# Patient Record
Sex: MALE | Race: WHITE | NOT HISPANIC OR LATINO | Employment: OTHER | URBAN - METROPOLITAN AREA
[De-identification: names, ages, dates, MRNs, and addresses within clinical notes are randomized per-mention and may not be internally consistent; named-entity substitution may affect disease eponyms.]

---

## 2020-04-13 ENCOUNTER — HOSPITAL ENCOUNTER (INPATIENT)
Facility: HOSPITAL | Age: 70
LOS: 14 days | Discharge: HOME | DRG: 885 | End: 2020-04-27
Attending: PSYCHIATRY & NEUROLOGY | Admitting: PSYCHIATRY & NEUROLOGY
Payer: COMMERCIAL

## 2020-04-13 ENCOUNTER — HOSPITAL ENCOUNTER (EMERGENCY)
Facility: HOSPITAL | Age: 70
End: 2020-04-13
Attending: EMERGENCY MEDICINE
Payer: COMMERCIAL

## 2020-04-13 VITALS
WEIGHT: 171.5 LBS | HEART RATE: 90 BPM | RESPIRATION RATE: 20 BRPM | SYSTOLIC BLOOD PRESSURE: 128 MMHG | TEMPERATURE: 97.2 F | BODY MASS INDEX: 23.23 KG/M2 | DIASTOLIC BLOOD PRESSURE: 77 MMHG | HEIGHT: 72 IN | OXYGEN SATURATION: 97 %

## 2020-04-13 DIAGNOSIS — R45.851 SUICIDAL IDEATIONS: ICD-10-CM

## 2020-04-13 DIAGNOSIS — G62.9 POLYNEUROPATHY: ICD-10-CM

## 2020-04-13 DIAGNOSIS — S22.080A T12 COMPRESSION FRACTURE (HCC): Chronic | ICD-10-CM

## 2020-04-13 DIAGNOSIS — R13.19 OTHER DYSPHAGIA: ICD-10-CM

## 2020-04-13 DIAGNOSIS — F32.A DEPRESSION: ICD-10-CM

## 2020-04-13 DIAGNOSIS — Z00.8 MEDICAL CLEARANCE FOR PSYCHIATRIC ADMISSION: ICD-10-CM

## 2020-04-13 DIAGNOSIS — Z00.8 MEDICAL CLEARANCE FOR PSYCHIATRIC ADMISSION: Primary | ICD-10-CM

## 2020-04-13 DIAGNOSIS — F33.3 MAJOR DEPRESSIVE DISORDER, RECURRENT, SEVERE WITH PSYCHOTIC FEATURES (HCC): Primary | Chronic | ICD-10-CM

## 2020-04-13 DIAGNOSIS — K50.919 CROHN'S DISEASE WITH COMPLICATION, UNSPECIFIED GASTROINTESTINAL TRACT LOCATION (HCC): ICD-10-CM

## 2020-04-13 LAB
ALBUMIN SERPL BCP-MCNC: 3.1 G/DL (ref 3.5–5)
ALP SERPL-CCNC: 140 U/L (ref 46–116)
ALT SERPL W P-5'-P-CCNC: 44 U/L (ref 12–78)
AMPHETAMINES SERPL QL SCN: NEGATIVE
ANION GAP SERPL CALCULATED.3IONS-SCNC: 5 MMOL/L (ref 4–13)
AST SERPL W P-5'-P-CCNC: 34 U/L (ref 5–45)
ATRIAL RATE: 83 BPM
BARBITURATES UR QL: NEGATIVE
BASOPHILS # BLD AUTO: 0.03 THOUSANDS/ΜL (ref 0–0.1)
BASOPHILS NFR BLD AUTO: 0 % (ref 0–1)
BENZODIAZ UR QL: NEGATIVE
BILIRUB SERPL-MCNC: 0.6 MG/DL (ref 0.2–1)
BUN SERPL-MCNC: 28 MG/DL (ref 5–25)
CALCIUM SERPL-MCNC: 8.9 MG/DL (ref 8.3–10.1)
CHLORIDE SERPL-SCNC: 100 MMOL/L (ref 100–108)
CLARITY, POC: CLEAR
CO2 SERPL-SCNC: 34 MMOL/L (ref 21–32)
COCAINE UR QL: NEGATIVE
COLOR, POC: YELLOW
CREAT SERPL-MCNC: 0.97 MG/DL (ref 0.6–1.3)
EOSINOPHIL # BLD AUTO: 0.02 THOUSAND/ΜL (ref 0–0.61)
EOSINOPHIL NFR BLD AUTO: 0 % (ref 0–6)
ERYTHROCYTE [DISTWIDTH] IN BLOOD BY AUTOMATED COUNT: 14.1 % (ref 11.6–15.1)
ETHANOL EXG-MCNC: 0 MG/DL
EXT BILIRUBIN, UA: NORMAL
EXT BLOOD URINE: NORMAL
EXT GLUCOSE, UA: NORMAL
EXT KETONES: NORMAL
EXT NITRITE, UA: NORMAL
EXT PH, UA: 8
EXT PROTEIN, UA: NORMAL
EXT SPECIFIC GRAVITY, UA: 1
EXT UROBILINOGEN: 0.2
GFR SERPL CREATININE-BSD FRML MDRD: 80 ML/MIN/1.73SQ M
GLUCOSE SERPL-MCNC: 131 MG/DL (ref 65–140)
HCT VFR BLD AUTO: 47.5 % (ref 36.5–49.3)
HGB BLD-MCNC: 15.1 G/DL (ref 12–17)
HOLD SPECIMEN: NORMAL
HOLD SPECIMEN: NORMAL
IMM GRANULOCYTES # BLD AUTO: 0.09 THOUSAND/UL (ref 0–0.2)
IMM GRANULOCYTES NFR BLD AUTO: 1 % (ref 0–2)
LYMPHOCYTES # BLD AUTO: 0.52 THOUSANDS/ΜL (ref 0.6–4.47)
LYMPHOCYTES NFR BLD AUTO: 6 % (ref 14–44)
MCH RBC QN AUTO: 30.1 PG (ref 26.8–34.3)
MCHC RBC AUTO-ENTMCNC: 31.8 G/DL (ref 31.4–37.4)
MCV RBC AUTO: 95 FL (ref 82–98)
METHADONE UR QL: NEGATIVE
MONOCYTES # BLD AUTO: 0.47 THOUSAND/ΜL (ref 0.17–1.22)
MONOCYTES NFR BLD AUTO: 5 % (ref 4–12)
NEUTROPHILS # BLD AUTO: 8.17 THOUSANDS/ΜL (ref 1.85–7.62)
NEUTS SEG NFR BLD AUTO: 88 % (ref 43–75)
NRBC BLD AUTO-RTO: 0 /100 WBCS
OPIATES UR QL SCN: NEGATIVE
P AXIS: 75 DEGREES
PCP UR QL: NEGATIVE
PLATELET # BLD AUTO: 194 THOUSANDS/UL (ref 149–390)
PMV BLD AUTO: 9.4 FL (ref 8.9–12.7)
POTASSIUM SERPL-SCNC: 4.1 MMOL/L (ref 3.5–5.3)
PR INTERVAL: 152 MS
PROT SERPL-MCNC: 8.1 G/DL (ref 6.4–8.2)
QRS AXIS: 82 DEGREES
QRSD INTERVAL: 90 MS
QT INTERVAL: 388 MS
QTC INTERVAL: 455 MS
RBC # BLD AUTO: 5.02 MILLION/UL (ref 3.88–5.62)
SODIUM SERPL-SCNC: 139 MMOL/L (ref 136–145)
T WAVE AXIS: 78 DEGREES
THC UR QL: NEGATIVE
TSH SERPL DL<=0.05 MIU/L-ACNC: 26.5 UIU/ML (ref 0.36–3.74)
VENTRICULAR RATE: 83 BPM
WBC # BLD AUTO: 9.3 THOUSAND/UL (ref 4.31–10.16)
WBC # BLD EST: NORMAL 10*3/UL

## 2020-04-13 PROCEDURE — 99285 EMERGENCY DEPT VISIT HI MDM: CPT | Performed by: EMERGENCY MEDICINE

## 2020-04-13 PROCEDURE — 82075 ASSAY OF BREATH ETHANOL: CPT

## 2020-04-13 PROCEDURE — 93010 ELECTROCARDIOGRAM REPORT: CPT | Performed by: INTERNAL MEDICINE

## 2020-04-13 PROCEDURE — 81002 URINALYSIS NONAUTO W/O SCOPE: CPT

## 2020-04-13 PROCEDURE — 85025 COMPLETE CBC W/AUTO DIFF WBC: CPT

## 2020-04-13 PROCEDURE — 80053 COMPREHEN METABOLIC PANEL: CPT

## 2020-04-13 PROCEDURE — 36415 COLL VENOUS BLD VENIPUNCTURE: CPT

## 2020-04-13 PROCEDURE — 93005 ELECTROCARDIOGRAM TRACING: CPT

## 2020-04-13 PROCEDURE — 80307 DRUG TEST PRSMV CHEM ANLYZR: CPT

## 2020-04-13 PROCEDURE — 84443 ASSAY THYROID STIM HORMONE: CPT

## 2020-04-13 PROCEDURE — 99285 EMERGENCY DEPT VISIT HI MDM: CPT

## 2020-04-13 RX ORDER — CLOTRIMAZOLE 10 MG/1
10 LOZENGE ORAL; TOPICAL
COMMUNITY
End: 2020-06-09 | Stop reason: HOSPADM

## 2020-04-13 RX ORDER — HYDROXYZINE HYDROCHLORIDE 25 MG/1
25 TABLET, FILM COATED ORAL EVERY 4 HOURS PRN
Status: CANCELLED | OUTPATIENT
Start: 2020-04-13

## 2020-04-13 RX ORDER — MIRTAZAPINE 15 MG/1
7.5 TABLET, FILM COATED ORAL
Status: CANCELLED | OUTPATIENT
Start: 2020-04-13

## 2020-04-13 RX ORDER — GABAPENTIN 100 MG/1
100 CAPSULE ORAL 3 TIMES DAILY
COMMUNITY
End: 2020-04-27 | Stop reason: HOSPADM

## 2020-04-13 RX ORDER — CYCLOBENZAPRINE HCL 10 MG
10 TABLET ORAL 3 TIMES DAILY PRN
COMMUNITY
End: 2020-06-09 | Stop reason: HOSPADM

## 2020-04-13 RX ORDER — MESALAMINE 1.2 G/1
1200 TABLET, DELAYED RELEASE ORAL 3 TIMES DAILY
COMMUNITY
End: 2020-06-09 | Stop reason: HOSPADM

## 2020-04-13 RX ORDER — MAGNESIUM HYDROXIDE/ALUMINUM HYDROXICE/SIMETHICONE 120; 1200; 1200 MG/30ML; MG/30ML; MG/30ML
30 SUSPENSION ORAL EVERY 4 HOURS PRN
Status: CANCELLED | OUTPATIENT
Start: 2020-04-13

## 2020-04-13 RX ORDER — POLYETHYLENE GLYCOL 3350 17 G/17G
17 POWDER, FOR SOLUTION ORAL DAILY PRN
Status: DISCONTINUED | OUTPATIENT
Start: 2020-04-13 | End: 2020-04-27 | Stop reason: HOSPADM

## 2020-04-13 RX ORDER — PREDNISONE 20 MG/1
20 TABLET ORAL DAILY
COMMUNITY
End: 2020-06-09 | Stop reason: HOSPADM

## 2020-04-13 RX ORDER — PANTOPRAZOLE SODIUM 40 MG/1
40 TABLET, DELAYED RELEASE ORAL DAILY
COMMUNITY
End: 2020-06-09 | Stop reason: HOSPADM

## 2020-04-13 RX ORDER — ATORVASTATIN CALCIUM 20 MG/1
20 TABLET, FILM COATED ORAL DAILY
COMMUNITY
End: 2020-06-09 | Stop reason: HOSPADM

## 2020-04-13 RX ORDER — POLYETHYLENE GLYCOL 3350 17 G/17G
17 POWDER, FOR SOLUTION ORAL DAILY PRN
Status: CANCELLED | OUTPATIENT
Start: 2020-04-13

## 2020-04-13 RX ORDER — ACETAMINOPHEN 325 MG/1
650 TABLET ORAL EVERY 4 HOURS PRN
Status: CANCELLED | OUTPATIENT
Start: 2020-04-13

## 2020-04-13 RX ORDER — MAGNESIUM HYDROXIDE/ALUMINUM HYDROXICE/SIMETHICONE 120; 1200; 1200 MG/30ML; MG/30ML; MG/30ML
30 SUSPENSION ORAL EVERY 4 HOURS PRN
Status: DISCONTINUED | OUTPATIENT
Start: 2020-04-13 | End: 2020-04-27 | Stop reason: HOSPADM

## 2020-04-13 RX ORDER — LEVOTHYROXINE SODIUM 0.07 MG/1
75 TABLET ORAL DAILY
COMMUNITY
End: 2020-06-09 | Stop reason: HOSPADM

## 2020-04-13 RX ORDER — SERTRALINE HYDROCHLORIDE 100 MG/1
150 TABLET, FILM COATED ORAL DAILY
COMMUNITY
End: 2020-04-27 | Stop reason: HOSPADM

## 2020-04-13 RX ORDER — MIRTAZAPINE 7.5 MG/1
7.5 TABLET, FILM COATED ORAL
Status: DISCONTINUED | OUTPATIENT
Start: 2020-04-13 | End: 2020-04-27 | Stop reason: HOSPADM

## 2020-04-13 RX ORDER — ACETAMINOPHEN 325 MG/1
650 TABLET ORAL EVERY 4 HOURS PRN
Status: DISCONTINUED | OUTPATIENT
Start: 2020-04-13 | End: 2020-04-14

## 2020-04-13 RX ORDER — HYDROXYZINE HYDROCHLORIDE 25 MG/1
25 TABLET, FILM COATED ORAL EVERY 4 HOURS PRN
Status: DISCONTINUED | OUTPATIENT
Start: 2020-04-13 | End: 2020-04-27 | Stop reason: HOSPADM

## 2020-04-13 RX ORDER — CARVEDILOL 3.12 MG/1
6.25 TABLET ORAL 2 TIMES DAILY WITH MEALS
COMMUNITY
End: 2020-06-09 | Stop reason: HOSPADM

## 2020-04-14 PROBLEM — G62.9 POLYNEUROPATHY: Status: ACTIVE | Noted: 2020-04-14

## 2020-04-14 PROBLEM — E03.9 HYPOTHYROIDISM: Status: ACTIVE | Noted: 2020-04-14

## 2020-04-14 PROBLEM — R13.19 OTHER DYSPHAGIA: Status: ACTIVE | Noted: 2020-04-14

## 2020-04-14 PROBLEM — E78.49 OTHER HYPERLIPIDEMIA: Status: ACTIVE | Noted: 2020-04-14

## 2020-04-14 PROBLEM — Z00.8 MEDICAL CLEARANCE FOR PSYCHIATRIC ADMISSION: Status: ACTIVE | Noted: 2020-04-14

## 2020-04-14 PROBLEM — K21.9 GERD WITHOUT ESOPHAGITIS: Status: ACTIVE | Noted: 2020-04-14

## 2020-04-14 PROBLEM — I10 ESSENTIAL HYPERTENSION: Status: ACTIVE | Noted: 2020-04-14

## 2020-04-14 PROCEDURE — 99253 IP/OBS CNSLTJ NEW/EST LOW 45: CPT | Performed by: NURSE PRACTITIONER

## 2020-04-14 PROCEDURE — 99222 1ST HOSP IP/OBS MODERATE 55: CPT | Performed by: PSYCHIATRY & NEUROLOGY

## 2020-04-14 RX ORDER — METHOCARBAMOL 500 MG/1
500 TABLET, FILM COATED ORAL EVERY 6 HOURS PRN
Status: DISCONTINUED | OUTPATIENT
Start: 2020-04-14 | End: 2020-04-27 | Stop reason: HOSPADM

## 2020-04-14 RX ORDER — ATORVASTATIN CALCIUM 20 MG/1
20 TABLET, FILM COATED ORAL
Status: DISCONTINUED | OUTPATIENT
Start: 2020-04-14 | End: 2020-04-27 | Stop reason: HOSPADM

## 2020-04-14 RX ORDER — PANTOPRAZOLE SODIUM 40 MG/1
40 TABLET, DELAYED RELEASE ORAL
Status: DISCONTINUED | OUTPATIENT
Start: 2020-04-14 | End: 2020-04-27 | Stop reason: HOSPADM

## 2020-04-14 RX ORDER — PREDNISONE 20 MG/1
20 TABLET ORAL DAILY
Status: DISCONTINUED | OUTPATIENT
Start: 2020-04-14 | End: 2020-04-27 | Stop reason: HOSPADM

## 2020-04-14 RX ORDER — CARVEDILOL 6.25 MG/1
6.25 TABLET ORAL 2 TIMES DAILY WITH MEALS
Status: DISCONTINUED | OUTPATIENT
Start: 2020-04-14 | End: 2020-04-27 | Stop reason: HOSPADM

## 2020-04-14 RX ORDER — DULOXETIN HYDROCHLORIDE 20 MG/1
20 CAPSULE, DELAYED RELEASE ORAL DAILY
Status: DISCONTINUED | OUTPATIENT
Start: 2020-04-15 | End: 2020-04-17

## 2020-04-14 RX ORDER — MESALAMINE 400 MG/1
400 CAPSULE, DELAYED RELEASE ORAL 3 TIMES DAILY
Status: DISCONTINUED | OUTPATIENT
Start: 2020-04-14 | End: 2020-04-27 | Stop reason: HOSPADM

## 2020-04-14 RX ORDER — LORAZEPAM 0.5 MG/1
0.5 TABLET ORAL EVERY 12 HOURS SCHEDULED
Status: DISCONTINUED | OUTPATIENT
Start: 2020-04-14 | End: 2020-04-16

## 2020-04-14 RX ORDER — ACETAMINOPHEN 325 MG/1
975 TABLET ORAL EVERY 8 HOURS PRN
Status: DISCONTINUED | OUTPATIENT
Start: 2020-04-14 | End: 2020-04-27 | Stop reason: HOSPADM

## 2020-04-14 RX ORDER — GABAPENTIN 100 MG/1
100 CAPSULE ORAL 3 TIMES DAILY
Status: DISCONTINUED | OUTPATIENT
Start: 2020-04-14 | End: 2020-04-15

## 2020-04-14 RX ORDER — LEVOTHYROXINE SODIUM 0.07 MG/1
75 TABLET ORAL
Status: DISCONTINUED | OUTPATIENT
Start: 2020-04-14 | End: 2020-04-27 | Stop reason: HOSPADM

## 2020-04-14 RX ADMIN — ATORVASTATIN CALCIUM 20 MG: 20 TABLET, FILM COATED ORAL at 17:54

## 2020-04-14 RX ADMIN — MESALAMINE 400 MG: 400 CAPSULE, DELAYED RELEASE ORAL at 11:14

## 2020-04-14 RX ADMIN — CARVEDILOL 6.25 MG: 6.25 TABLET, FILM COATED ORAL at 17:54

## 2020-04-14 RX ADMIN — LEVOTHYROXINE SODIUM 75 MCG: 75 TABLET ORAL at 11:14

## 2020-04-14 RX ADMIN — GABAPENTIN 100 MG: 100 CAPSULE ORAL at 11:14

## 2020-04-14 RX ADMIN — GABAPENTIN 100 MG: 100 CAPSULE ORAL at 21:10

## 2020-04-14 RX ADMIN — PREDNISONE 20 MG: 20 TABLET ORAL at 11:14

## 2020-04-14 RX ADMIN — GABAPENTIN 100 MG: 100 CAPSULE ORAL at 17:54

## 2020-04-14 RX ADMIN — MESALAMINE 400 MG: 400 CAPSULE, DELAYED RELEASE ORAL at 21:10

## 2020-04-14 RX ADMIN — SERTRALINE HYDROCHLORIDE 75 MG: 50 TABLET ORAL at 17:55

## 2020-04-14 RX ADMIN — PANTOPRAZOLE SODIUM 40 MG: 40 TABLET, DELAYED RELEASE ORAL at 11:14

## 2020-04-14 RX ADMIN — LORAZEPAM 0.5 MG: 0.5 TABLET ORAL at 21:10

## 2020-04-14 RX ADMIN — MESALAMINE 400 MG: 400 CAPSULE, DELAYED RELEASE ORAL at 17:54

## 2020-04-15 PROCEDURE — 99232 SBSQ HOSP IP/OBS MODERATE 35: CPT | Performed by: NURSE PRACTITIONER

## 2020-04-15 PROCEDURE — 92610 EVALUATE SWALLOWING FUNCTION: CPT

## 2020-04-15 RX ORDER — GABAPENTIN 300 MG/1
300 CAPSULE ORAL 3 TIMES DAILY
Status: DISCONTINUED | OUTPATIENT
Start: 2020-04-15 | End: 2020-04-17

## 2020-04-15 RX ADMIN — MESALAMINE 400 MG: 400 CAPSULE, DELAYED RELEASE ORAL at 21:30

## 2020-04-15 RX ADMIN — PANTOPRAZOLE SODIUM 40 MG: 40 TABLET, DELAYED RELEASE ORAL at 06:01

## 2020-04-15 RX ADMIN — LORAZEPAM 0.5 MG: 0.5 TABLET ORAL at 21:30

## 2020-04-15 RX ADMIN — SERTRALINE HYDROCHLORIDE 75 MG: 50 TABLET ORAL at 09:01

## 2020-04-15 RX ADMIN — DULOXETINE HYDROCHLORIDE 20 MG: 20 CAPSULE, DELAYED RELEASE ORAL at 09:02

## 2020-04-15 RX ADMIN — CARVEDILOL 6.25 MG: 6.25 TABLET, FILM COATED ORAL at 17:12

## 2020-04-15 RX ADMIN — LORAZEPAM 0.5 MG: 0.5 TABLET ORAL at 09:02

## 2020-04-15 RX ADMIN — CARVEDILOL 6.25 MG: 6.25 TABLET, FILM COATED ORAL at 09:03

## 2020-04-15 RX ADMIN — LEVOTHYROXINE SODIUM 75 MCG: 75 TABLET ORAL at 06:01

## 2020-04-15 RX ADMIN — GABAPENTIN 100 MG: 100 CAPSULE ORAL at 09:02

## 2020-04-15 RX ADMIN — MESALAMINE 400 MG: 400 CAPSULE, DELAYED RELEASE ORAL at 09:03

## 2020-04-15 RX ADMIN — GABAPENTIN 300 MG: 300 CAPSULE ORAL at 17:12

## 2020-04-15 RX ADMIN — MESALAMINE 400 MG: 400 CAPSULE, DELAYED RELEASE ORAL at 17:12

## 2020-04-15 RX ADMIN — ACETAMINOPHEN 975 MG: 325 TABLET ORAL at 12:03

## 2020-04-15 RX ADMIN — ATORVASTATIN CALCIUM 20 MG: 20 TABLET, FILM COATED ORAL at 17:12

## 2020-04-15 RX ADMIN — PREDNISONE 20 MG: 20 TABLET ORAL at 09:02

## 2020-04-15 RX ADMIN — GABAPENTIN 300 MG: 300 CAPSULE ORAL at 21:30

## 2020-04-16 PROBLEM — F11.21 OPIOID USE DISORDER, SEVERE, IN SUSTAINED REMISSION (HCC): Chronic | Status: ACTIVE | Noted: 2020-04-16

## 2020-04-16 PROCEDURE — 99232 SBSQ HOSP IP/OBS MODERATE 35: CPT | Performed by: PSYCHIATRY & NEUROLOGY

## 2020-04-16 RX ORDER — LORAZEPAM 1 MG/1
1 TABLET ORAL EVERY 12 HOURS SCHEDULED
Status: DISCONTINUED | OUTPATIENT
Start: 2020-04-16 | End: 2020-04-23

## 2020-04-16 RX ADMIN — CARVEDILOL 6.25 MG: 6.25 TABLET, FILM COATED ORAL at 08:22

## 2020-04-16 RX ADMIN — PREDNISONE 20 MG: 20 TABLET ORAL at 08:22

## 2020-04-16 RX ADMIN — MESALAMINE 400 MG: 400 CAPSULE, DELAYED RELEASE ORAL at 21:27

## 2020-04-16 RX ADMIN — GABAPENTIN 300 MG: 300 CAPSULE ORAL at 21:27

## 2020-04-16 RX ADMIN — MESALAMINE 400 MG: 400 CAPSULE, DELAYED RELEASE ORAL at 08:22

## 2020-04-16 RX ADMIN — DULOXETINE HYDROCHLORIDE 20 MG: 20 CAPSULE, DELAYED RELEASE ORAL at 08:22

## 2020-04-16 RX ADMIN — LEVOTHYROXINE SODIUM 75 MCG: 75 TABLET ORAL at 05:53

## 2020-04-16 RX ADMIN — ATORVASTATIN CALCIUM 20 MG: 20 TABLET, FILM COATED ORAL at 17:32

## 2020-04-16 RX ADMIN — CARVEDILOL 6.25 MG: 6.25 TABLET, FILM COATED ORAL at 17:32

## 2020-04-16 RX ADMIN — MESALAMINE 400 MG: 400 CAPSULE, DELAYED RELEASE ORAL at 17:32

## 2020-04-16 RX ADMIN — LORAZEPAM 1 MG: 1 TABLET ORAL at 21:27

## 2020-04-16 RX ADMIN — SERTRALINE HYDROCHLORIDE 50 MG: 50 TABLET ORAL at 08:22

## 2020-04-16 RX ADMIN — GABAPENTIN 300 MG: 300 CAPSULE ORAL at 17:32

## 2020-04-16 RX ADMIN — PANTOPRAZOLE SODIUM 40 MG: 40 TABLET, DELAYED RELEASE ORAL at 05:53

## 2020-04-16 RX ADMIN — LORAZEPAM 0.5 MG: 0.5 TABLET ORAL at 08:22

## 2020-04-16 RX ADMIN — GABAPENTIN 300 MG: 300 CAPSULE ORAL at 08:22

## 2020-04-16 RX ADMIN — ACETAMINOPHEN 975 MG: 325 TABLET ORAL at 12:38

## 2020-04-17 LAB — SARS-COV-2 RNA RESP QL NAA+PROBE: NEGATIVE

## 2020-04-17 PROCEDURE — 87635 SARS-COV-2 COVID-19 AMP PRB: CPT | Performed by: EMERGENCY MEDICINE

## 2020-04-17 PROCEDURE — 99232 SBSQ HOSP IP/OBS MODERATE 35: CPT | Performed by: PSYCHIATRY & NEUROLOGY

## 2020-04-17 RX ORDER — GABAPENTIN 400 MG/1
400 CAPSULE ORAL 3 TIMES DAILY
Status: DISCONTINUED | OUTPATIENT
Start: 2020-04-17 | End: 2020-04-22

## 2020-04-17 RX ORDER — DULOXETIN HYDROCHLORIDE 30 MG/1
30 CAPSULE, DELAYED RELEASE ORAL DAILY
Status: DISCONTINUED | OUTPATIENT
Start: 2020-04-18 | End: 2020-04-21

## 2020-04-17 RX ADMIN — PREDNISONE 20 MG: 20 TABLET ORAL at 09:01

## 2020-04-17 RX ADMIN — CARVEDILOL 6.25 MG: 6.25 TABLET, FILM COATED ORAL at 16:08

## 2020-04-17 RX ADMIN — MESALAMINE 400 MG: 400 CAPSULE, DELAYED RELEASE ORAL at 21:58

## 2020-04-17 RX ADMIN — MESALAMINE 400 MG: 400 CAPSULE, DELAYED RELEASE ORAL at 16:11

## 2020-04-17 RX ADMIN — ACETAMINOPHEN 975 MG: 325 TABLET ORAL at 09:31

## 2020-04-17 RX ADMIN — PANTOPRAZOLE SODIUM 40 MG: 40 TABLET, DELAYED RELEASE ORAL at 06:06

## 2020-04-17 RX ADMIN — LORAZEPAM 1 MG: 1 TABLET ORAL at 09:01

## 2020-04-17 RX ADMIN — ATORVASTATIN CALCIUM 20 MG: 20 TABLET, FILM COATED ORAL at 18:08

## 2020-04-17 RX ADMIN — GABAPENTIN 400 MG: 400 CAPSULE ORAL at 21:58

## 2020-04-17 RX ADMIN — DULOXETINE HYDROCHLORIDE 20 MG: 20 CAPSULE, DELAYED RELEASE ORAL at 09:01

## 2020-04-17 RX ADMIN — LEVOTHYROXINE SODIUM 75 MCG: 75 TABLET ORAL at 06:06

## 2020-04-17 RX ADMIN — LORAZEPAM 1 MG: 1 TABLET ORAL at 21:58

## 2020-04-17 RX ADMIN — ACETAMINOPHEN 975 MG: 325 TABLET ORAL at 22:02

## 2020-04-17 RX ADMIN — MESALAMINE 400 MG: 400 CAPSULE, DELAYED RELEASE ORAL at 09:01

## 2020-04-17 RX ADMIN — GABAPENTIN 300 MG: 300 CAPSULE ORAL at 16:10

## 2020-04-17 RX ADMIN — SERTRALINE HYDROCHLORIDE 50 MG: 50 TABLET ORAL at 09:01

## 2020-04-17 RX ADMIN — GABAPENTIN 300 MG: 300 CAPSULE ORAL at 09:01

## 2020-04-17 RX ADMIN — CARVEDILOL 6.25 MG: 6.25 TABLET, FILM COATED ORAL at 08:01

## 2020-04-18 PROCEDURE — 99232 SBSQ HOSP IP/OBS MODERATE 35: CPT | Performed by: PSYCHIATRY & NEUROLOGY

## 2020-04-18 RX ADMIN — MESALAMINE 400 MG: 400 CAPSULE, DELAYED RELEASE ORAL at 21:41

## 2020-04-18 RX ADMIN — LEVOTHYROXINE SODIUM 75 MCG: 75 TABLET ORAL at 06:15

## 2020-04-18 RX ADMIN — ATORVASTATIN CALCIUM 20 MG: 20 TABLET, FILM COATED ORAL at 18:26

## 2020-04-18 RX ADMIN — LORAZEPAM 1 MG: 1 TABLET ORAL at 08:26

## 2020-04-18 RX ADMIN — DULOXETINE 30 MG: 30 CAPSULE, DELAYED RELEASE ORAL at 08:26

## 2020-04-18 RX ADMIN — SERTRALINE HYDROCHLORIDE 50 MG: 50 TABLET ORAL at 08:26

## 2020-04-18 RX ADMIN — CARVEDILOL 6.25 MG: 6.25 TABLET, FILM COATED ORAL at 08:26

## 2020-04-18 RX ADMIN — GABAPENTIN 400 MG: 400 CAPSULE ORAL at 21:40

## 2020-04-18 RX ADMIN — CARVEDILOL 6.25 MG: 6.25 TABLET, FILM COATED ORAL at 16:26

## 2020-04-18 RX ADMIN — ACETAMINOPHEN 975 MG: 325 TABLET ORAL at 12:22

## 2020-04-18 RX ADMIN — GABAPENTIN 400 MG: 400 CAPSULE ORAL at 16:25

## 2020-04-18 RX ADMIN — MESALAMINE 400 MG: 400 CAPSULE, DELAYED RELEASE ORAL at 16:25

## 2020-04-18 RX ADMIN — PANTOPRAZOLE SODIUM 40 MG: 40 TABLET, DELAYED RELEASE ORAL at 06:15

## 2020-04-18 RX ADMIN — LORAZEPAM 1 MG: 1 TABLET ORAL at 21:40

## 2020-04-18 RX ADMIN — PREDNISONE 20 MG: 20 TABLET ORAL at 08:26

## 2020-04-18 RX ADMIN — GABAPENTIN 400 MG: 400 CAPSULE ORAL at 08:26

## 2020-04-18 RX ADMIN — MESALAMINE 400 MG: 400 CAPSULE, DELAYED RELEASE ORAL at 08:26

## 2020-04-19 PROCEDURE — 99232 SBSQ HOSP IP/OBS MODERATE 35: CPT | Performed by: PSYCHIATRY & NEUROLOGY

## 2020-04-19 RX ADMIN — GABAPENTIN 400 MG: 400 CAPSULE ORAL at 08:34

## 2020-04-19 RX ADMIN — SERTRALINE HYDROCHLORIDE 50 MG: 50 TABLET ORAL at 08:30

## 2020-04-19 RX ADMIN — LORAZEPAM 1 MG: 1 TABLET ORAL at 21:58

## 2020-04-19 RX ADMIN — DULOXETINE 30 MG: 30 CAPSULE, DELAYED RELEASE ORAL at 08:31

## 2020-04-19 RX ADMIN — MESALAMINE 400 MG: 400 CAPSULE, DELAYED RELEASE ORAL at 08:30

## 2020-04-19 RX ADMIN — CARVEDILOL 6.25 MG: 6.25 TABLET, FILM COATED ORAL at 17:12

## 2020-04-19 RX ADMIN — MESALAMINE 400 MG: 400 CAPSULE, DELAYED RELEASE ORAL at 21:58

## 2020-04-19 RX ADMIN — LORAZEPAM 1 MG: 1 TABLET ORAL at 08:30

## 2020-04-19 RX ADMIN — ACETAMINOPHEN 975 MG: 325 TABLET ORAL at 12:47

## 2020-04-19 RX ADMIN — MESALAMINE 400 MG: 400 CAPSULE, DELAYED RELEASE ORAL at 17:12

## 2020-04-19 RX ADMIN — PANTOPRAZOLE SODIUM 40 MG: 40 TABLET, DELAYED RELEASE ORAL at 05:40

## 2020-04-19 RX ADMIN — GABAPENTIN 400 MG: 400 CAPSULE ORAL at 16:11

## 2020-04-19 RX ADMIN — ATORVASTATIN CALCIUM 20 MG: 20 TABLET, FILM COATED ORAL at 17:12

## 2020-04-19 RX ADMIN — CARVEDILOL 6.25 MG: 6.25 TABLET, FILM COATED ORAL at 07:31

## 2020-04-19 RX ADMIN — PREDNISONE 20 MG: 20 TABLET ORAL at 08:31

## 2020-04-19 RX ADMIN — GABAPENTIN 400 MG: 400 CAPSULE ORAL at 21:58

## 2020-04-19 RX ADMIN — LEVOTHYROXINE SODIUM 75 MCG: 75 TABLET ORAL at 05:40

## 2020-04-20 PROCEDURE — 99232 SBSQ HOSP IP/OBS MODERATE 35: CPT | Performed by: PSYCHIATRY & NEUROLOGY

## 2020-04-20 PROCEDURE — 92526 ORAL FUNCTION THERAPY: CPT

## 2020-04-20 RX ORDER — ACETAMINOPHEN 325 MG/1
975 TABLET ORAL 3 TIMES DAILY
Status: DISCONTINUED | OUTPATIENT
Start: 2020-04-20 | End: 2020-04-23

## 2020-04-20 RX ORDER — OLANZAPINE 5 MG/1
5 TABLET ORAL
Status: DISCONTINUED | OUTPATIENT
Start: 2020-04-20 | End: 2020-04-22

## 2020-04-20 RX ADMIN — LORAZEPAM 1 MG: 1 TABLET ORAL at 08:38

## 2020-04-20 RX ADMIN — PREDNISONE 20 MG: 20 TABLET ORAL at 08:38

## 2020-04-20 RX ADMIN — GABAPENTIN 400 MG: 400 CAPSULE ORAL at 17:21

## 2020-04-20 RX ADMIN — PANTOPRAZOLE SODIUM 40 MG: 40 TABLET, DELAYED RELEASE ORAL at 06:33

## 2020-04-20 RX ADMIN — ACETAMINOPHEN 975 MG: 325 TABLET ORAL at 14:32

## 2020-04-20 RX ADMIN — ATORVASTATIN CALCIUM 20 MG: 20 TABLET, FILM COATED ORAL at 17:21

## 2020-04-20 RX ADMIN — OLANZAPINE 5 MG: 5 TABLET, FILM COATED ORAL at 21:13

## 2020-04-20 RX ADMIN — GABAPENTIN 400 MG: 400 CAPSULE ORAL at 08:38

## 2020-04-20 RX ADMIN — CARVEDILOL 6.25 MG: 6.25 TABLET, FILM COATED ORAL at 08:38

## 2020-04-20 RX ADMIN — GABAPENTIN 400 MG: 400 CAPSULE ORAL at 21:13

## 2020-04-20 RX ADMIN — ACETAMINOPHEN 975 MG: 325 TABLET ORAL at 18:24

## 2020-04-20 RX ADMIN — LORAZEPAM 1 MG: 1 TABLET ORAL at 21:13

## 2020-04-20 RX ADMIN — LEVOTHYROXINE SODIUM 75 MCG: 75 TABLET ORAL at 06:34

## 2020-04-20 RX ADMIN — CARVEDILOL 6.25 MG: 6.25 TABLET, FILM COATED ORAL at 17:21

## 2020-04-20 RX ADMIN — MESALAMINE 400 MG: 400 CAPSULE, DELAYED RELEASE ORAL at 08:37

## 2020-04-20 RX ADMIN — SERTRALINE HYDROCHLORIDE 50 MG: 50 TABLET ORAL at 08:39

## 2020-04-20 RX ADMIN — MESALAMINE 400 MG: 400 CAPSULE, DELAYED RELEASE ORAL at 21:14

## 2020-04-20 RX ADMIN — ACETAMINOPHEN 975 MG: 325 TABLET ORAL at 21:14

## 2020-04-20 RX ADMIN — DULOXETINE 30 MG: 30 CAPSULE, DELAYED RELEASE ORAL at 08:38

## 2020-04-20 RX ADMIN — MESALAMINE 400 MG: 400 CAPSULE, DELAYED RELEASE ORAL at 17:21

## 2020-04-21 PROCEDURE — 99232 SBSQ HOSP IP/OBS MODERATE 35: CPT | Performed by: PSYCHIATRY & NEUROLOGY

## 2020-04-21 RX ORDER — DULOXETIN HYDROCHLORIDE 20 MG/1
40 CAPSULE, DELAYED RELEASE ORAL DAILY
Status: DISCONTINUED | OUTPATIENT
Start: 2020-04-22 | End: 2020-04-24

## 2020-04-21 RX ORDER — LIDOCAINE 50 MG/G
1 PATCH TOPICAL DAILY
Status: DISCONTINUED | OUTPATIENT
Start: 2020-04-21 | End: 2020-04-23

## 2020-04-21 RX ADMIN — LIDOCAINE 1 PATCH: 50 PATCH TOPICAL at 11:10

## 2020-04-21 RX ADMIN — GABAPENTIN 400 MG: 400 CAPSULE ORAL at 08:20

## 2020-04-21 RX ADMIN — OLANZAPINE 5 MG: 5 TABLET, FILM COATED ORAL at 21:32

## 2020-04-21 RX ADMIN — PREDNISONE 20 MG: 20 TABLET ORAL at 08:20

## 2020-04-21 RX ADMIN — MESALAMINE 400 MG: 400 CAPSULE, DELAYED RELEASE ORAL at 21:32

## 2020-04-21 RX ADMIN — ACETAMINOPHEN 975 MG: 325 TABLET ORAL at 21:31

## 2020-04-21 RX ADMIN — ATORVASTATIN CALCIUM 20 MG: 20 TABLET, FILM COATED ORAL at 17:10

## 2020-04-21 RX ADMIN — DULOXETINE 30 MG: 30 CAPSULE, DELAYED RELEASE ORAL at 08:19

## 2020-04-21 RX ADMIN — CARVEDILOL 6.25 MG: 6.25 TABLET, FILM COATED ORAL at 17:10

## 2020-04-21 RX ADMIN — LORAZEPAM 1 MG: 1 TABLET ORAL at 21:32

## 2020-04-21 RX ADMIN — PANTOPRAZOLE SODIUM 40 MG: 40 TABLET, DELAYED RELEASE ORAL at 05:46

## 2020-04-21 RX ADMIN — MESALAMINE 400 MG: 400 CAPSULE, DELAYED RELEASE ORAL at 08:19

## 2020-04-21 RX ADMIN — MESALAMINE 400 MG: 400 CAPSULE, DELAYED RELEASE ORAL at 17:00

## 2020-04-21 RX ADMIN — GABAPENTIN 400 MG: 400 CAPSULE ORAL at 17:00

## 2020-04-21 RX ADMIN — ACETAMINOPHEN 975 MG: 325 TABLET ORAL at 14:49

## 2020-04-21 RX ADMIN — GABAPENTIN 400 MG: 400 CAPSULE ORAL at 21:32

## 2020-04-21 RX ADMIN — ACETAMINOPHEN 975 MG: 325 TABLET ORAL at 08:19

## 2020-04-21 RX ADMIN — LORAZEPAM 1 MG: 1 TABLET ORAL at 08:20

## 2020-04-21 RX ADMIN — LEVOTHYROXINE SODIUM 75 MCG: 75 TABLET ORAL at 05:46

## 2020-04-21 RX ADMIN — CARVEDILOL 6.25 MG: 6.25 TABLET, FILM COATED ORAL at 08:20

## 2020-04-22 ENCOUNTER — APPOINTMENT (INPATIENT)
Dept: MRI IMAGING | Facility: HOSPITAL | Age: 70
DRG: 885 | End: 2020-04-22
Payer: COMMERCIAL

## 2020-04-22 PROCEDURE — 72157 MRI CHEST SPINE W/O & W/DYE: CPT

## 2020-04-22 PROCEDURE — 99232 SBSQ HOSP IP/OBS MODERATE 35: CPT | Performed by: PSYCHIATRY & NEUROLOGY

## 2020-04-22 PROCEDURE — 72158 MRI LUMBAR SPINE W/O & W/DYE: CPT

## 2020-04-22 PROCEDURE — A9585 GADOBUTROL INJECTION: HCPCS | Performed by: PSYCHIATRY & NEUROLOGY

## 2020-04-22 RX ADMIN — PREDNISONE 20 MG: 20 TABLET ORAL at 08:41

## 2020-04-22 RX ADMIN — GADOBUTROL 7 ML: 604.72 INJECTION INTRAVENOUS at 13:31

## 2020-04-22 RX ADMIN — PANTOPRAZOLE SODIUM 40 MG: 40 TABLET, DELAYED RELEASE ORAL at 05:56

## 2020-04-22 RX ADMIN — CARVEDILOL 6.25 MG: 6.25 TABLET, FILM COATED ORAL at 17:05

## 2020-04-22 RX ADMIN — ACETAMINOPHEN 975 MG: 325 TABLET ORAL at 21:31

## 2020-04-22 RX ADMIN — LEVOTHYROXINE SODIUM 75 MCG: 75 TABLET ORAL at 05:57

## 2020-04-22 RX ADMIN — ACETAMINOPHEN 975 MG: 325 TABLET ORAL at 08:36

## 2020-04-22 RX ADMIN — MESALAMINE 400 MG: 400 CAPSULE, DELAYED RELEASE ORAL at 08:40

## 2020-04-22 RX ADMIN — LORAZEPAM 1 MG: 1 TABLET ORAL at 08:40

## 2020-04-22 RX ADMIN — ATORVASTATIN CALCIUM 20 MG: 20 TABLET, FILM COATED ORAL at 17:04

## 2020-04-22 RX ADMIN — GABAPENTIN 400 MG: 400 CAPSULE ORAL at 08:38

## 2020-04-22 RX ADMIN — OLANZAPINE 7.5 MG: 2.5 TABLET, FILM COATED ORAL at 21:31

## 2020-04-22 RX ADMIN — ACETAMINOPHEN 975 MG: 325 TABLET ORAL at 15:48

## 2020-04-22 RX ADMIN — LORAZEPAM 1 MG: 1 TABLET ORAL at 21:31

## 2020-04-22 RX ADMIN — CARVEDILOL 6.25 MG: 6.25 TABLET, FILM COATED ORAL at 08:36

## 2020-04-22 RX ADMIN — LIDOCAINE 1 PATCH: 50 PATCH TOPICAL at 08:38

## 2020-04-22 RX ADMIN — DULOXETINE HYDROCHLORIDE 40 MG: 20 CAPSULE, DELAYED RELEASE ORAL at 08:37

## 2020-04-22 RX ADMIN — METHOCARBAMOL 500 MG: 500 TABLET, FILM COATED ORAL at 17:15

## 2020-04-22 RX ADMIN — MESALAMINE 400 MG: 400 CAPSULE, DELAYED RELEASE ORAL at 21:31

## 2020-04-22 RX ADMIN — GABAPENTIN 500 MG: 100 CAPSULE ORAL at 21:31

## 2020-04-22 RX ADMIN — MESALAMINE 400 MG: 400 CAPSULE, DELAYED RELEASE ORAL at 17:00

## 2020-04-22 RX ADMIN — GABAPENTIN 400 MG: 400 CAPSULE ORAL at 17:00

## 2020-04-23 PROCEDURE — 99232 SBSQ HOSP IP/OBS MODERATE 35: CPT | Performed by: PSYCHIATRY & NEUROLOGY

## 2020-04-23 RX ORDER — OXYCODONE HYDROCHLORIDE AND ACETAMINOPHEN 5; 325 MG/1; MG/1
2 TABLET ORAL EVERY 8 HOURS SCHEDULED
Status: DISCONTINUED | OUTPATIENT
Start: 2020-04-23 | End: 2020-04-25

## 2020-04-23 RX ORDER — LORAZEPAM 0.5 MG/1
0.5 TABLET ORAL EVERY 12 HOURS SCHEDULED
Status: DISCONTINUED | OUTPATIENT
Start: 2020-04-23 | End: 2020-04-24

## 2020-04-23 RX ADMIN — GABAPENTIN 500 MG: 100 CAPSULE ORAL at 21:54

## 2020-04-23 RX ADMIN — GABAPENTIN 500 MG: 100 CAPSULE ORAL at 08:30

## 2020-04-23 RX ADMIN — METHOCARBAMOL 500 MG: 500 TABLET, FILM COATED ORAL at 09:32

## 2020-04-23 RX ADMIN — PANTOPRAZOLE SODIUM 40 MG: 40 TABLET, DELAYED RELEASE ORAL at 05:44

## 2020-04-23 RX ADMIN — LEVOTHYROXINE SODIUM 75 MCG: 75 TABLET ORAL at 05:43

## 2020-04-23 RX ADMIN — ATORVASTATIN CALCIUM 20 MG: 20 TABLET, FILM COATED ORAL at 17:45

## 2020-04-23 RX ADMIN — MESALAMINE 400 MG: 400 CAPSULE, DELAYED RELEASE ORAL at 08:32

## 2020-04-23 RX ADMIN — MESALAMINE 400 MG: 400 CAPSULE, DELAYED RELEASE ORAL at 21:54

## 2020-04-23 RX ADMIN — CARVEDILOL 6.25 MG: 6.25 TABLET, FILM COATED ORAL at 08:29

## 2020-04-23 RX ADMIN — LORAZEPAM 1 MG: 1 TABLET ORAL at 08:32

## 2020-04-23 RX ADMIN — OLANZAPINE 7.5 MG: 2.5 TABLET, FILM COATED ORAL at 21:55

## 2020-04-23 RX ADMIN — OXYCODONE HYDROCHLORIDE AND ACETAMINOPHEN 2 TABLET: 5; 325 TABLET ORAL at 16:11

## 2020-04-23 RX ADMIN — PREDNISONE 20 MG: 20 TABLET ORAL at 08:33

## 2020-04-23 RX ADMIN — DULOXETINE HYDROCHLORIDE 40 MG: 20 CAPSULE, DELAYED RELEASE ORAL at 08:29

## 2020-04-23 RX ADMIN — GABAPENTIN 500 MG: 100 CAPSULE ORAL at 16:13

## 2020-04-23 RX ADMIN — MESALAMINE 400 MG: 400 CAPSULE, DELAYED RELEASE ORAL at 16:12

## 2020-04-23 RX ADMIN — ACETAMINOPHEN 975 MG: 325 TABLET ORAL at 08:28

## 2020-04-23 RX ADMIN — OXYCODONE HYDROCHLORIDE AND ACETAMINOPHEN 2 TABLET: 5; 325 TABLET ORAL at 21:55

## 2020-04-23 RX ADMIN — LORAZEPAM 0.5 MG: 0.5 TABLET ORAL at 21:56

## 2020-04-23 RX ADMIN — CARVEDILOL 6.25 MG: 6.25 TABLET, FILM COATED ORAL at 16:13

## 2020-04-23 RX ADMIN — LIDOCAINE 1 PATCH: 50 PATCH TOPICAL at 08:30

## 2020-04-24 PROCEDURE — 99232 SBSQ HOSP IP/OBS MODERATE 35: CPT | Performed by: PSYCHIATRY & NEUROLOGY

## 2020-04-24 RX ORDER — DULOXETIN HYDROCHLORIDE 60 MG/1
60 CAPSULE, DELAYED RELEASE ORAL DAILY
Status: DISCONTINUED | OUTPATIENT
Start: 2020-04-27 | End: 2020-04-27 | Stop reason: HOSPADM

## 2020-04-24 RX ORDER — LORAZEPAM 0.5 MG/1
0.5 TABLET ORAL EVERY 12 HOURS SCHEDULED
Status: COMPLETED | OUTPATIENT
Start: 2020-04-24 | End: 2020-04-25

## 2020-04-24 RX ORDER — DULOXETIN HYDROCHLORIDE 20 MG/1
40 CAPSULE, DELAYED RELEASE ORAL DAILY
Status: COMPLETED | OUTPATIENT
Start: 2020-04-25 | End: 2020-04-26

## 2020-04-24 RX ORDER — GABAPENTIN 400 MG/1
400 CAPSULE ORAL 3 TIMES DAILY
Status: DISCONTINUED | OUTPATIENT
Start: 2020-04-24 | End: 2020-04-27 | Stop reason: HOSPADM

## 2020-04-24 RX ADMIN — HYDROXYZINE HYDROCHLORIDE 25 MG: 25 TABLET, FILM COATED ORAL at 17:35

## 2020-04-24 RX ADMIN — GABAPENTIN 400 MG: 400 CAPSULE ORAL at 17:00

## 2020-04-24 RX ADMIN — GABAPENTIN 500 MG: 100 CAPSULE ORAL at 08:44

## 2020-04-24 RX ADMIN — PANTOPRAZOLE SODIUM 40 MG: 40 TABLET, DELAYED RELEASE ORAL at 06:24

## 2020-04-24 RX ADMIN — OXYCODONE HYDROCHLORIDE AND ACETAMINOPHEN 2 TABLET: 5; 325 TABLET ORAL at 06:24

## 2020-04-24 RX ADMIN — METHOCARBAMOL 500 MG: 500 TABLET, FILM COATED ORAL at 02:26

## 2020-04-24 RX ADMIN — CARVEDILOL 6.25 MG: 6.25 TABLET, FILM COATED ORAL at 17:00

## 2020-04-24 RX ADMIN — PREDNISONE 20 MG: 20 TABLET ORAL at 08:43

## 2020-04-24 RX ADMIN — ATORVASTATIN CALCIUM 20 MG: 20 TABLET, FILM COATED ORAL at 17:35

## 2020-04-24 RX ADMIN — LORAZEPAM 0.5 MG: 0.5 TABLET ORAL at 21:25

## 2020-04-24 RX ADMIN — OXYCODONE HYDROCHLORIDE AND ACETAMINOPHEN 2 TABLET: 5; 325 TABLET ORAL at 13:40

## 2020-04-24 RX ADMIN — DULOXETINE HYDROCHLORIDE 40 MG: 20 CAPSULE, DELAYED RELEASE ORAL at 08:45

## 2020-04-24 RX ADMIN — MESALAMINE 400 MG: 400 CAPSULE, DELAYED RELEASE ORAL at 08:44

## 2020-04-24 RX ADMIN — LORAZEPAM 0.5 MG: 0.5 TABLET ORAL at 08:44

## 2020-04-24 RX ADMIN — OXYCODONE HYDROCHLORIDE AND ACETAMINOPHEN 2 TABLET: 5; 325 TABLET ORAL at 21:30

## 2020-04-24 RX ADMIN — LEVOTHYROXINE SODIUM 75 MCG: 75 TABLET ORAL at 06:25

## 2020-04-24 RX ADMIN — MESALAMINE 400 MG: 400 CAPSULE, DELAYED RELEASE ORAL at 17:00

## 2020-04-24 RX ADMIN — MESALAMINE 400 MG: 400 CAPSULE, DELAYED RELEASE ORAL at 21:26

## 2020-04-24 RX ADMIN — OLANZAPINE 7.5 MG: 2.5 TABLET, FILM COATED ORAL at 21:26

## 2020-04-24 RX ADMIN — CARVEDILOL 6.25 MG: 6.25 TABLET, FILM COATED ORAL at 08:43

## 2020-04-24 RX ADMIN — GABAPENTIN 400 MG: 400 CAPSULE ORAL at 21:25

## 2020-04-25 RX ORDER — OXYCODONE HYDROCHLORIDE AND ACETAMINOPHEN 5; 325 MG/1; MG/1
2 TABLET ORAL EVERY 8 HOURS SCHEDULED
Status: DISCONTINUED | OUTPATIENT
Start: 2020-04-25 | End: 2020-04-27 | Stop reason: HOSPADM

## 2020-04-25 RX ADMIN — MESALAMINE 400 MG: 400 CAPSULE, DELAYED RELEASE ORAL at 16:33

## 2020-04-25 RX ADMIN — GABAPENTIN 400 MG: 400 CAPSULE ORAL at 09:00

## 2020-04-25 RX ADMIN — METHOCARBAMOL 500 MG: 500 TABLET, FILM COATED ORAL at 17:34

## 2020-04-25 RX ADMIN — GABAPENTIN 400 MG: 400 CAPSULE ORAL at 16:33

## 2020-04-25 RX ADMIN — PREDNISONE 20 MG: 20 TABLET ORAL at 09:00

## 2020-04-25 RX ADMIN — GABAPENTIN 400 MG: 400 CAPSULE ORAL at 21:44

## 2020-04-25 RX ADMIN — PANTOPRAZOLE SODIUM 40 MG: 40 TABLET, DELAYED RELEASE ORAL at 05:47

## 2020-04-25 RX ADMIN — OXYCODONE HYDROCHLORIDE AND ACETAMINOPHEN 2 TABLET: 5; 325 TABLET ORAL at 05:47

## 2020-04-25 RX ADMIN — ATORVASTATIN CALCIUM 20 MG: 20 TABLET, FILM COATED ORAL at 17:33

## 2020-04-25 RX ADMIN — CARVEDILOL 6.25 MG: 6.25 TABLET, FILM COATED ORAL at 07:59

## 2020-04-25 RX ADMIN — HYDROXYZINE HYDROCHLORIDE 25 MG: 25 TABLET, FILM COATED ORAL at 17:34

## 2020-04-25 RX ADMIN — MESALAMINE 400 MG: 400 CAPSULE, DELAYED RELEASE ORAL at 08:58

## 2020-04-25 RX ADMIN — OXYCODONE HYDROCHLORIDE AND ACETAMINOPHEN 2 TABLET: 5; 325 TABLET ORAL at 21:44

## 2020-04-25 RX ADMIN — LEVOTHYROXINE SODIUM 75 MCG: 75 TABLET ORAL at 05:47

## 2020-04-25 RX ADMIN — OXYCODONE HYDROCHLORIDE AND ACETAMINOPHEN 2 TABLET: 5; 325 TABLET ORAL at 13:41

## 2020-04-25 RX ADMIN — MESALAMINE 400 MG: 400 CAPSULE, DELAYED RELEASE ORAL at 21:44

## 2020-04-25 RX ADMIN — OLANZAPINE 7.5 MG: 2.5 TABLET, FILM COATED ORAL at 22:06

## 2020-04-25 RX ADMIN — DULOXETINE HYDROCHLORIDE 40 MG: 20 CAPSULE, DELAYED RELEASE ORAL at 09:01

## 2020-04-25 RX ADMIN — LORAZEPAM 0.5 MG: 0.5 TABLET ORAL at 09:00

## 2020-04-25 RX ADMIN — CARVEDILOL 6.25 MG: 6.25 TABLET, FILM COATED ORAL at 16:34

## 2020-04-26 RX ORDER — OXYCODONE HYDROCHLORIDE 5 MG/1
5 TABLET ORAL ONCE
Status: COMPLETED | OUTPATIENT
Start: 2020-04-26 | End: 2020-04-26

## 2020-04-26 RX ADMIN — OLANZAPINE 7.5 MG: 2.5 TABLET, FILM COATED ORAL at 21:49

## 2020-04-26 RX ADMIN — GABAPENTIN 400 MG: 400 CAPSULE ORAL at 21:50

## 2020-04-26 RX ADMIN — METHOCARBAMOL 500 MG: 500 TABLET, FILM COATED ORAL at 07:22

## 2020-04-26 RX ADMIN — DULOXETINE HYDROCHLORIDE 40 MG: 20 CAPSULE, DELAYED RELEASE ORAL at 08:21

## 2020-04-26 RX ADMIN — MESALAMINE 400 MG: 400 CAPSULE, DELAYED RELEASE ORAL at 08:21

## 2020-04-26 RX ADMIN — PANTOPRAZOLE SODIUM 40 MG: 40 TABLET, DELAYED RELEASE ORAL at 06:00

## 2020-04-26 RX ADMIN — METHOCARBAMOL 500 MG: 500 TABLET, FILM COATED ORAL at 16:00

## 2020-04-26 RX ADMIN — MESALAMINE 400 MG: 400 CAPSULE, DELAYED RELEASE ORAL at 16:39

## 2020-04-26 RX ADMIN — CARVEDILOL 6.25 MG: 6.25 TABLET, FILM COATED ORAL at 16:39

## 2020-04-26 RX ADMIN — OXYCODONE HYDROCHLORIDE AND ACETAMINOPHEN 2 TABLET: 5; 325 TABLET ORAL at 08:23

## 2020-04-26 RX ADMIN — GABAPENTIN 400 MG: 400 CAPSULE ORAL at 08:21

## 2020-04-26 RX ADMIN — OXYCODONE HYDROCHLORIDE AND ACETAMINOPHEN 2 TABLET: 5; 325 TABLET ORAL at 21:00

## 2020-04-26 RX ADMIN — MESALAMINE 400 MG: 400 CAPSULE, DELAYED RELEASE ORAL at 21:49

## 2020-04-26 RX ADMIN — GABAPENTIN 400 MG: 400 CAPSULE ORAL at 16:39

## 2020-04-26 RX ADMIN — PREDNISONE 20 MG: 20 TABLET ORAL at 08:23

## 2020-04-26 RX ADMIN — CARVEDILOL 6.25 MG: 6.25 TABLET, FILM COATED ORAL at 08:20

## 2020-04-26 RX ADMIN — OXYCODONE HYDROCHLORIDE AND ACETAMINOPHEN 2 TABLET: 5; 325 TABLET ORAL at 13:33

## 2020-04-26 RX ADMIN — ATORVASTATIN CALCIUM 20 MG: 20 TABLET, FILM COATED ORAL at 17:05

## 2020-04-26 RX ADMIN — OXYCODONE HYDROCHLORIDE 5 MG: 5 TABLET ORAL at 18:11

## 2020-04-26 RX ADMIN — LEVOTHYROXINE SODIUM 75 MCG: 75 TABLET ORAL at 06:00

## 2020-04-27 VITALS
WEIGHT: 164.46 LBS | RESPIRATION RATE: 17 BRPM | HEIGHT: 72 IN | DIASTOLIC BLOOD PRESSURE: 92 MMHG | HEART RATE: 86 BPM | OXYGEN SATURATION: 97 % | SYSTOLIC BLOOD PRESSURE: 152 MMHG | BODY MASS INDEX: 22.28 KG/M2 | TEMPERATURE: 96.5 F

## 2020-04-27 PROBLEM — S22.080A T12 COMPRESSION FRACTURE (HCC): Chronic | Status: ACTIVE | Noted: 2020-04-27

## 2020-04-27 PROCEDURE — 99239 HOSP IP/OBS DSCHRG MGMT >30: CPT | Performed by: PSYCHIATRY & NEUROLOGY

## 2020-04-27 RX ORDER — OXYCODONE HYDROCHLORIDE AND ACETAMINOPHEN 5; 325 MG/1; MG/1
2 TABLET ORAL EVERY 8 HOURS PRN
Qty: 18 TABLET | Refills: 0 | Status: SHIPPED | OUTPATIENT
Start: 2020-04-27 | End: 2020-06-09 | Stop reason: HOSPADM

## 2020-04-27 RX ORDER — OLANZAPINE 7.5 MG/1
7.5 TABLET ORAL
Qty: 30 TABLET | Refills: 0 | Status: SHIPPED | OUTPATIENT
Start: 2020-04-27 | End: 2020-06-09 | Stop reason: HOSPADM

## 2020-04-27 RX ORDER — GABAPENTIN 400 MG/1
400 CAPSULE ORAL 3 TIMES DAILY
Qty: 90 CAPSULE | Refills: 0 | Status: SHIPPED | OUTPATIENT
Start: 2020-04-27 | End: 2020-06-09 | Stop reason: HOSPADM

## 2020-04-27 RX ORDER — DULOXETIN HYDROCHLORIDE 60 MG/1
60 CAPSULE, DELAYED RELEASE ORAL DAILY
Qty: 30 CAPSULE | Refills: 0 | Status: SHIPPED | OUTPATIENT
Start: 2020-04-28 | End: 2020-06-09 | Stop reason: HOSPADM

## 2020-04-27 RX ADMIN — PREDNISONE 20 MG: 20 TABLET ORAL at 08:23

## 2020-04-27 RX ADMIN — CARVEDILOL 6.25 MG: 6.25 TABLET, FILM COATED ORAL at 08:23

## 2020-04-27 RX ADMIN — LEVOTHYROXINE SODIUM 75 MCG: 75 TABLET ORAL at 05:11

## 2020-04-27 RX ADMIN — OXYCODONE HYDROCHLORIDE AND ACETAMINOPHEN 2 TABLET: 5; 325 TABLET ORAL at 08:23

## 2020-04-27 RX ADMIN — MESALAMINE 400 MG: 400 CAPSULE, DELAYED RELEASE ORAL at 08:23

## 2020-04-27 RX ADMIN — PANTOPRAZOLE SODIUM 40 MG: 40 TABLET, DELAYED RELEASE ORAL at 05:11

## 2020-04-27 RX ADMIN — GABAPENTIN 400 MG: 400 CAPSULE ORAL at 08:23

## 2020-04-27 RX ADMIN — DULOXETINE HYDROCHLORIDE 60 MG: 60 CAPSULE, DELAYED RELEASE ORAL at 08:23

## 2020-05-05 ENCOUNTER — HOSPITAL ENCOUNTER (INPATIENT)
Facility: HOSPITAL | Age: 70
LOS: 35 days | Discharge: HOME | DRG: 885 | End: 2020-06-09
Attending: PSYCHIATRY & NEUROLOGY | Admitting: PSYCHIATRY & NEUROLOGY
Payer: COMMERCIAL

## 2020-05-05 ENCOUNTER — HOSPITAL ENCOUNTER (EMERGENCY)
Facility: HOSPITAL | Age: 70
End: 2020-05-05
Attending: EMERGENCY MEDICINE | Admitting: EMERGENCY MEDICINE
Payer: COMMERCIAL

## 2020-05-05 VITALS
DIASTOLIC BLOOD PRESSURE: 70 MMHG | TEMPERATURE: 97.6 F | BODY MASS INDEX: 22.21 KG/M2 | OXYGEN SATURATION: 98 % | SYSTOLIC BLOOD PRESSURE: 119 MMHG | HEART RATE: 98 BPM | HEIGHT: 72 IN | WEIGHT: 164 LBS | RESPIRATION RATE: 18 BRPM

## 2020-05-05 DIAGNOSIS — Z00.8 MEDICAL CLEARANCE FOR PSYCHIATRIC ADMISSION: ICD-10-CM

## 2020-05-05 DIAGNOSIS — K50.918 CROHN'S DISEASE WITH OTHER COMPLICATION, UNSPECIFIED GASTROINTESTINAL TRACT LOCATION (HCC): ICD-10-CM

## 2020-05-05 DIAGNOSIS — K50.90 CROHN DISEASE (HCC): ICD-10-CM

## 2020-05-05 DIAGNOSIS — T85.848A PAIN AROUND PEG TUBE SITE, INITIAL ENCOUNTER: ICD-10-CM

## 2020-05-05 DIAGNOSIS — K94.22 INFECTION OF PEG SITE (HCC): ICD-10-CM

## 2020-05-05 DIAGNOSIS — F33.3 MAJOR DEPRESSIVE DISORDER, RECURRENT, SEVERE WITH PSYCHOTIC FEATURES (HCC): Primary | Chronic | ICD-10-CM

## 2020-05-05 DIAGNOSIS — K52.9 COLITIS, ACUTE: ICD-10-CM

## 2020-05-05 DIAGNOSIS — R45.851 DEPRESSION WITH SUICIDAL IDEATION: Primary | ICD-10-CM

## 2020-05-05 DIAGNOSIS — I10 ESSENTIAL HYPERTENSION: ICD-10-CM

## 2020-05-05 DIAGNOSIS — E03.9 ACQUIRED HYPOTHYROIDISM: ICD-10-CM

## 2020-05-05 DIAGNOSIS — F32.A DEPRESSION WITH SUICIDAL IDEATION: Primary | ICD-10-CM

## 2020-05-05 DIAGNOSIS — E78.49 OTHER HYPERLIPIDEMIA: ICD-10-CM

## 2020-05-05 DIAGNOSIS — G62.9 POLYNEUROPATHY: ICD-10-CM

## 2020-05-05 DIAGNOSIS — K21.9 GERD WITHOUT ESOPHAGITIS: ICD-10-CM

## 2020-05-05 DIAGNOSIS — S51.819A FOREARM LACERATION: ICD-10-CM

## 2020-05-05 LAB
ALBUMIN SERPL BCP-MCNC: 2.9 G/DL (ref 3.5–5)
ALP SERPL-CCNC: 105 U/L (ref 46–116)
ALT SERPL W P-5'-P-CCNC: 22 U/L (ref 12–78)
AMPHETAMINES SERPL QL SCN: NEGATIVE
ANION GAP SERPL CALCULATED.3IONS-SCNC: 8 MMOL/L (ref 4–13)
AST SERPL W P-5'-P-CCNC: 22 U/L (ref 5–45)
BARBITURATES UR QL: NEGATIVE
BASOPHILS # BLD AUTO: 0.02 THOUSANDS/ΜL (ref 0–0.1)
BASOPHILS NFR BLD AUTO: 0 % (ref 0–1)
BENZODIAZ UR QL: NEGATIVE
BILIRUB SERPL-MCNC: 0.8 MG/DL (ref 0.2–1)
BILIRUB UR QL STRIP: NEGATIVE
BUN SERPL-MCNC: 19 MG/DL (ref 5–25)
CALCIUM SERPL-MCNC: 8.8 MG/DL (ref 8.3–10.1)
CHLORIDE SERPL-SCNC: 101 MMOL/L (ref 100–108)
CLARITY UR: CLEAR
CO2 SERPL-SCNC: 29 MMOL/L (ref 21–32)
COCAINE UR QL: NEGATIVE
COLOR UR: YELLOW
CREAT SERPL-MCNC: 0.98 MG/DL (ref 0.6–1.3)
EOSINOPHIL # BLD AUTO: 0.02 THOUSAND/ΜL (ref 0–0.61)
EOSINOPHIL NFR BLD AUTO: 0 % (ref 0–6)
ERYTHROCYTE [DISTWIDTH] IN BLOOD BY AUTOMATED COUNT: 13.4 % (ref 11.6–15.1)
ETHANOL EXG-MCNC: 0 MG/DL
GFR SERPL CREATININE-BSD FRML MDRD: 79 ML/MIN/1.73SQ M
GLUCOSE SERPL-MCNC: 93 MG/DL (ref 65–140)
GLUCOSE UR STRIP-MCNC: NEGATIVE MG/DL
HCT VFR BLD AUTO: 43.4 % (ref 36.5–49.3)
HGB BLD-MCNC: 13.9 G/DL (ref 12–17)
HGB UR QL STRIP.AUTO: NEGATIVE
IMM GRANULOCYTES # BLD AUTO: 0.06 THOUSAND/UL (ref 0–0.2)
IMM GRANULOCYTES NFR BLD AUTO: 1 % (ref 0–2)
KETONES UR STRIP-MCNC: NEGATIVE MG/DL
LEUKOCYTE ESTERASE UR QL STRIP: NEGATIVE
LYMPHOCYTES # BLD AUTO: 0.59 THOUSANDS/ΜL (ref 0.6–4.47)
LYMPHOCYTES NFR BLD AUTO: 8 % (ref 14–44)
MCH RBC QN AUTO: 30.3 PG (ref 26.8–34.3)
MCHC RBC AUTO-ENTMCNC: 32 G/DL (ref 31.4–37.4)
MCV RBC AUTO: 95 FL (ref 82–98)
METHADONE UR QL: NEGATIVE
MONOCYTES # BLD AUTO: 0.69 THOUSAND/ΜL (ref 0.17–1.22)
MONOCYTES NFR BLD AUTO: 10 % (ref 4–12)
NEUTROPHILS # BLD AUTO: 5.84 THOUSANDS/ΜL (ref 1.85–7.62)
NEUTS SEG NFR BLD AUTO: 81 % (ref 43–75)
NITRITE UR QL STRIP: NEGATIVE
NRBC BLD AUTO-RTO: 0 /100 WBCS
OPIATES UR QL SCN: NEGATIVE
PCP UR QL: NEGATIVE
PH UR STRIP.AUTO: 6 [PH]
PLATELET # BLD AUTO: 221 THOUSANDS/UL (ref 149–390)
PMV BLD AUTO: 9.4 FL (ref 8.9–12.7)
POTASSIUM SERPL-SCNC: 3.5 MMOL/L (ref 3.5–5.3)
PROT SERPL-MCNC: 8.5 G/DL (ref 6.4–8.2)
PROT UR STRIP-MCNC: NEGATIVE MG/DL
RBC # BLD AUTO: 4.59 MILLION/UL (ref 3.88–5.62)
SARS-COV-2 RNA RESP QL NAA+PROBE: NEGATIVE
SODIUM SERPL-SCNC: 138 MMOL/L (ref 136–145)
SP GR UR STRIP.AUTO: 1.01 (ref 1–1.03)
THC UR QL: NEGATIVE
TROPONIN I SERPL-MCNC: <0.02 NG/ML
TSH SERPL DL<=0.05 MIU/L-ACNC: 11.02 UIU/ML (ref 0.36–3.74)
UROBILINOGEN UR QL STRIP.AUTO: 1 E.U./DL
WBC # BLD AUTO: 7.22 THOUSAND/UL (ref 4.31–10.16)

## 2020-05-05 PROCEDURE — 87635 SARS-COV-2 COVID-19 AMP PRB: CPT | Performed by: PHYSICIAN ASSISTANT

## 2020-05-05 PROCEDURE — 99285 EMERGENCY DEPT VISIT HI MDM: CPT | Performed by: PHYSICIAN ASSISTANT

## 2020-05-05 PROCEDURE — 85025 COMPLETE CBC W/AUTO DIFF WBC: CPT | Performed by: PHYSICIAN ASSISTANT

## 2020-05-05 PROCEDURE — 36415 COLL VENOUS BLD VENIPUNCTURE: CPT | Performed by: PHYSICIAN ASSISTANT

## 2020-05-05 PROCEDURE — 80053 COMPREHEN METABOLIC PANEL: CPT | Performed by: PHYSICIAN ASSISTANT

## 2020-05-05 PROCEDURE — 93005 ELECTROCARDIOGRAM TRACING: CPT

## 2020-05-05 PROCEDURE — 80307 DRUG TEST PRSMV CHEM ANLYZR: CPT | Performed by: PHYSICIAN ASSISTANT

## 2020-05-05 PROCEDURE — 84443 ASSAY THYROID STIM HORMONE: CPT | Performed by: PHYSICIAN ASSISTANT

## 2020-05-05 PROCEDURE — 81003 URINALYSIS AUTO W/O SCOPE: CPT | Performed by: PHYSICIAN ASSISTANT

## 2020-05-05 PROCEDURE — 84484 ASSAY OF TROPONIN QUANT: CPT | Performed by: PHYSICIAN ASSISTANT

## 2020-05-05 PROCEDURE — 99285 EMERGENCY DEPT VISIT HI MDM: CPT

## 2020-05-05 PROCEDURE — 82075 ASSAY OF BREATH ETHANOL: CPT | Performed by: PHYSICIAN ASSISTANT

## 2020-05-05 RX ORDER — ATORVASTATIN CALCIUM 40 MG/1
20 TABLET, FILM COATED ORAL
Status: CANCELLED | OUTPATIENT
Start: 2020-05-05

## 2020-05-05 RX ORDER — MIRTAZAPINE 15 MG/1
7.5 TABLET, FILM COATED ORAL
Status: CANCELLED | OUTPATIENT
Start: 2020-05-05

## 2020-05-05 RX ORDER — CHOLECALCIFEROL (VITAMIN D3) 125 MCG
500 CAPSULE ORAL DAILY
Status: ON HOLD | COMMUNITY
End: 2020-06-09 | Stop reason: SDUPTHER

## 2020-05-05 RX ORDER — HALOPERIDOL 5 MG
5 TABLET ORAL EVERY 6 HOURS PRN
Status: DISCONTINUED | OUTPATIENT
Start: 2020-05-05 | End: 2020-06-09 | Stop reason: HOSPADM

## 2020-05-05 RX ORDER — LEVOTHYROXINE SODIUM 0.07 MG/1
75 TABLET ORAL
Status: CANCELLED | OUTPATIENT
Start: 2020-05-06

## 2020-05-05 RX ORDER — PANTOPRAZOLE SODIUM 40 MG/1
40 TABLET, DELAYED RELEASE ORAL
Status: DISCONTINUED | OUTPATIENT
Start: 2020-05-06 | End: 2020-06-09 | Stop reason: HOSPADM

## 2020-05-05 RX ORDER — HALOPERIDOL 5 MG
5 TABLET ORAL EVERY 6 HOURS PRN
Status: CANCELLED | OUTPATIENT
Start: 2020-05-05

## 2020-05-05 RX ORDER — MESALAMINE 400 MG/1
1200 CAPSULE, DELAYED RELEASE ORAL 3 TIMES DAILY
Status: DISCONTINUED | OUTPATIENT
Start: 2020-05-05 | End: 2020-06-09 | Stop reason: HOSPADM

## 2020-05-05 RX ORDER — POLYETHYLENE GLYCOL 3350 17 G/17G
17 POWDER, FOR SOLUTION ORAL DAILY PRN
Status: DISCONTINUED | OUTPATIENT
Start: 2020-05-05 | End: 2020-06-09 | Stop reason: HOSPADM

## 2020-05-05 RX ORDER — LEVOTHYROXINE SODIUM 0.07 MG/1
75 TABLET ORAL
Status: DISCONTINUED | OUTPATIENT
Start: 2020-05-06 | End: 2020-05-08

## 2020-05-05 RX ORDER — ATORVASTATIN CALCIUM 20 MG/1
20 TABLET, FILM COATED ORAL
Status: DISCONTINUED | OUTPATIENT
Start: 2020-05-06 | End: 2020-06-09 | Stop reason: HOSPADM

## 2020-05-05 RX ORDER — ASCORBIC ACID 500 MG
500 TABLET ORAL DAILY
COMMUNITY
End: 2020-06-09 | Stop reason: HOSPADM

## 2020-05-05 RX ORDER — PANTOPRAZOLE SODIUM 40 MG/1
40 TABLET, DELAYED RELEASE ORAL
Status: CANCELLED | OUTPATIENT
Start: 2020-05-06

## 2020-05-05 RX ORDER — MIRTAZAPINE 7.5 MG/1
7.5 TABLET, FILM COATED ORAL
Status: DISCONTINUED | OUTPATIENT
Start: 2020-05-05 | End: 2020-06-09 | Stop reason: HOSPADM

## 2020-05-05 RX ORDER — HYDROXYZINE HYDROCHLORIDE 25 MG/1
25 TABLET, FILM COATED ORAL EVERY 6 HOURS PRN
Status: DISCONTINUED | OUTPATIENT
Start: 2020-05-05 | End: 2020-06-09 | Stop reason: HOSPADM

## 2020-05-05 RX ORDER — GABAPENTIN 400 MG/1
400 CAPSULE ORAL 3 TIMES DAILY
Status: DISCONTINUED | OUTPATIENT
Start: 2020-05-05 | End: 2020-05-11

## 2020-05-05 RX ORDER — ONDANSETRON 4 MG/1
4 TABLET, ORALLY DISINTEGRATING ORAL EVERY 6 HOURS PRN
Status: CANCELLED | OUTPATIENT
Start: 2020-05-05

## 2020-05-05 RX ORDER — HALOPERIDOL 5 MG/ML
5 INJECTION INTRAMUSCULAR EVERY 6 HOURS PRN
Status: CANCELLED | OUTPATIENT
Start: 2020-05-05

## 2020-05-05 RX ORDER — POLYETHYLENE GLYCOL 3350 17 G/17G
17 POWDER, FOR SOLUTION ORAL DAILY PRN
Status: CANCELLED | OUTPATIENT
Start: 2020-05-05

## 2020-05-05 RX ORDER — CARVEDILOL 3.12 MG/1
3.12 TABLET ORAL 2 TIMES DAILY WITH MEALS
Status: DISCONTINUED | OUTPATIENT
Start: 2020-05-06 | End: 2020-06-09 | Stop reason: HOSPADM

## 2020-05-05 RX ORDER — HALOPERIDOL 5 MG/ML
5 INJECTION INTRAMUSCULAR EVERY 6 HOURS PRN
Status: DISCONTINUED | OUTPATIENT
Start: 2020-05-05 | End: 2020-06-09 | Stop reason: HOSPADM

## 2020-05-05 RX ORDER — MESALAMINE 400 MG/1
1200 CAPSULE, DELAYED RELEASE ORAL 3 TIMES DAILY
Status: CANCELLED | OUTPATIENT
Start: 2020-05-05

## 2020-05-05 RX ORDER — CARVEDILOL 3.12 MG/1
3.12 TABLET ORAL 2 TIMES DAILY WITH MEALS
Status: CANCELLED | OUTPATIENT
Start: 2020-05-05

## 2020-05-05 RX ORDER — ONDANSETRON 4 MG/1
4 TABLET, ORALLY DISINTEGRATING ORAL EVERY 6 HOURS PRN
Status: DISCONTINUED | OUTPATIENT
Start: 2020-05-05 | End: 2020-06-09 | Stop reason: HOSPADM

## 2020-05-05 RX ORDER — LOPERAMIDE HYDROCHLORIDE 2 MG/1
2 CAPSULE ORAL 4 TIMES DAILY PRN
Status: DISCONTINUED | OUTPATIENT
Start: 2020-05-05 | End: 2020-06-09 | Stop reason: HOSPADM

## 2020-05-05 RX ORDER — HYDROXYZINE HYDROCHLORIDE 25 MG/1
25 TABLET, FILM COATED ORAL EVERY 6 HOURS PRN
Status: CANCELLED | OUTPATIENT
Start: 2020-05-05

## 2020-05-05 RX ORDER — LOPERAMIDE HYDROCHLORIDE 2 MG/1
2 CAPSULE ORAL 4 TIMES DAILY PRN
Status: CANCELLED | OUTPATIENT
Start: 2020-05-05

## 2020-05-05 RX ADMIN — GABAPENTIN 400 MG: 400 CAPSULE ORAL at 21:41

## 2020-05-05 RX ADMIN — MESALAMINE 1200 MG: 400 CAPSULE, DELAYED RELEASE ORAL at 21:41

## 2020-05-06 LAB
ATRIAL RATE: 93 BPM
P AXIS: 55 DEGREES
PR INTERVAL: 150 MS
QRS AXIS: 107 DEGREES
QRSD INTERVAL: 84 MS
QT INTERVAL: 370 MS
QTC INTERVAL: 460 MS
T WAVE AXIS: 15 DEGREES
VENTRICULAR RATE: 93 BPM

## 2020-05-06 PROCEDURE — 99254 IP/OBS CNSLTJ NEW/EST MOD 60: CPT | Performed by: FAMILY MEDICINE

## 2020-05-06 PROCEDURE — 99221 1ST HOSP IP/OBS SF/LOW 40: CPT | Performed by: PSYCHIATRY & NEUROLOGY

## 2020-05-06 PROCEDURE — 93010 ELECTROCARDIOGRAM REPORT: CPT | Performed by: INTERNAL MEDICINE

## 2020-05-06 RX ADMIN — LEVOTHYROXINE SODIUM 75 MCG: 75 TABLET ORAL at 06:11

## 2020-05-06 RX ADMIN — CYANOCOBALAMIN TAB 1000 MCG 500 MCG: 1000 TAB at 09:44

## 2020-05-06 RX ADMIN — GABAPENTIN 400 MG: 400 CAPSULE ORAL at 21:26

## 2020-05-06 RX ADMIN — GABAPENTIN 400 MG: 400 CAPSULE ORAL at 09:41

## 2020-05-06 RX ADMIN — HYDROXYZINE HYDROCHLORIDE 25 MG: 25 TABLET, FILM COATED ORAL at 01:47

## 2020-05-06 RX ADMIN — MESALAMINE 1200 MG: 400 CAPSULE, DELAYED RELEASE ORAL at 21:26

## 2020-05-06 RX ADMIN — GABAPENTIN 400 MG: 400 CAPSULE ORAL at 17:28

## 2020-05-06 RX ADMIN — CARVEDILOL 3.12 MG: 3.12 TABLET, FILM COATED ORAL at 09:44

## 2020-05-06 RX ADMIN — MESALAMINE 1200 MG: 400 CAPSULE, DELAYED RELEASE ORAL at 17:27

## 2020-05-06 RX ADMIN — LOPERAMIDE HYDROCHLORIDE 2 MG: 2 CAPSULE ORAL at 02:03

## 2020-05-06 RX ADMIN — CARVEDILOL 3.12 MG: 3.12 TABLET, FILM COATED ORAL at 17:27

## 2020-05-06 RX ADMIN — MESALAMINE 1200 MG: 400 CAPSULE, DELAYED RELEASE ORAL at 09:43

## 2020-05-06 RX ADMIN — PANTOPRAZOLE SODIUM 40 MG: 40 TABLET, DELAYED RELEASE ORAL at 06:11

## 2020-05-06 RX ADMIN — ATORVASTATIN CALCIUM 20 MG: 20 TABLET, FILM COATED ORAL at 17:27

## 2020-05-07 PROCEDURE — 99232 SBSQ HOSP IP/OBS MODERATE 35: CPT | Performed by: PSYCHIATRY & NEUROLOGY

## 2020-05-07 RX ORDER — FLUOXETINE HYDROCHLORIDE 20 MG/1
20 CAPSULE ORAL DAILY
Status: DISCONTINUED | OUTPATIENT
Start: 2020-05-07 | End: 2020-05-21

## 2020-05-07 RX ORDER — OLANZAPINE 5 MG/1
5 TABLET ORAL
Status: DISCONTINUED | OUTPATIENT
Start: 2020-05-07 | End: 2020-05-08

## 2020-05-07 RX ADMIN — GABAPENTIN 400 MG: 400 CAPSULE ORAL at 11:47

## 2020-05-07 RX ADMIN — ATORVASTATIN CALCIUM 20 MG: 20 TABLET, FILM COATED ORAL at 18:08

## 2020-05-07 RX ADMIN — GABAPENTIN 400 MG: 400 CAPSULE ORAL at 21:56

## 2020-05-07 RX ADMIN — CYANOCOBALAMIN TAB 1000 MCG 500 MCG: 1000 TAB at 11:45

## 2020-05-07 RX ADMIN — MESALAMINE 1200 MG: 400 CAPSULE, DELAYED RELEASE ORAL at 21:56

## 2020-05-07 RX ADMIN — LEVOTHYROXINE SODIUM 75 MCG: 75 TABLET ORAL at 05:20

## 2020-05-07 RX ADMIN — PANTOPRAZOLE SODIUM 40 MG: 40 TABLET, DELAYED RELEASE ORAL at 05:20

## 2020-05-07 RX ADMIN — OLANZAPINE 5 MG: 5 TABLET, FILM COATED ORAL at 21:56

## 2020-05-07 RX ADMIN — MESALAMINE 1200 MG: 400 CAPSULE, DELAYED RELEASE ORAL at 11:43

## 2020-05-07 RX ADMIN — MESALAMINE 1200 MG: 400 CAPSULE, DELAYED RELEASE ORAL at 18:09

## 2020-05-07 RX ADMIN — GABAPENTIN 400 MG: 400 CAPSULE ORAL at 18:07

## 2020-05-07 RX ADMIN — CARVEDILOL 3.12 MG: 3.12 TABLET, FILM COATED ORAL at 18:08

## 2020-05-07 RX ADMIN — FLUOXETINE 20 MG: 20 CAPSULE ORAL at 14:33

## 2020-05-07 RX ADMIN — CARVEDILOL 3.12 MG: 3.12 TABLET, FILM COATED ORAL at 11:44

## 2020-05-08 PROCEDURE — 99232 SBSQ HOSP IP/OBS MODERATE 35: CPT | Performed by: NURSE PRACTITIONER

## 2020-05-08 PROCEDURE — 99232 SBSQ HOSP IP/OBS MODERATE 35: CPT | Performed by: FAMILY MEDICINE

## 2020-05-08 RX ORDER — OLANZAPINE 10 MG/1
10 TABLET ORAL
Status: DISCONTINUED | OUTPATIENT
Start: 2020-05-08 | End: 2020-06-09 | Stop reason: HOSPADM

## 2020-05-08 RX ORDER — LEVOTHYROXINE SODIUM 88 UG/1
88 TABLET ORAL
Status: DISCONTINUED | OUTPATIENT
Start: 2020-05-09 | End: 2020-06-09 | Stop reason: HOSPADM

## 2020-05-08 RX ADMIN — GABAPENTIN 400 MG: 400 CAPSULE ORAL at 17:00

## 2020-05-08 RX ADMIN — FLUOXETINE 20 MG: 20 CAPSULE ORAL at 09:16

## 2020-05-08 RX ADMIN — CYANOCOBALAMIN TAB 1000 MCG 500 MCG: 1000 TAB at 09:17

## 2020-05-08 RX ADMIN — GABAPENTIN 400 MG: 400 CAPSULE ORAL at 21:41

## 2020-05-08 RX ADMIN — LEVOTHYROXINE SODIUM 75 MCG: 75 TABLET ORAL at 06:06

## 2020-05-08 RX ADMIN — CARVEDILOL 3.12 MG: 3.12 TABLET, FILM COATED ORAL at 17:24

## 2020-05-08 RX ADMIN — MESALAMINE 1200 MG: 400 CAPSULE, DELAYED RELEASE ORAL at 08:16

## 2020-05-08 RX ADMIN — GABAPENTIN 400 MG: 400 CAPSULE ORAL at 08:16

## 2020-05-08 RX ADMIN — OLANZAPINE 10 MG: 10 TABLET, FILM COATED ORAL at 21:41

## 2020-05-08 RX ADMIN — MESALAMINE 1200 MG: 400 CAPSULE, DELAYED RELEASE ORAL at 17:00

## 2020-05-08 RX ADMIN — CARVEDILOL 3.12 MG: 3.12 TABLET, FILM COATED ORAL at 08:17

## 2020-05-08 RX ADMIN — PANTOPRAZOLE SODIUM 40 MG: 40 TABLET, DELAYED RELEASE ORAL at 06:06

## 2020-05-08 RX ADMIN — MESALAMINE 1200 MG: 400 CAPSULE, DELAYED RELEASE ORAL at 21:41

## 2020-05-08 RX ADMIN — ATORVASTATIN CALCIUM 20 MG: 20 TABLET, FILM COATED ORAL at 17:24

## 2020-05-09 RX ADMIN — GABAPENTIN 400 MG: 400 CAPSULE ORAL at 15:25

## 2020-05-09 RX ADMIN — MESALAMINE 1200 MG: 400 CAPSULE, DELAYED RELEASE ORAL at 08:08

## 2020-05-09 RX ADMIN — CARVEDILOL 3.12 MG: 3.12 TABLET, FILM COATED ORAL at 15:31

## 2020-05-09 RX ADMIN — GABAPENTIN 400 MG: 400 CAPSULE ORAL at 07:53

## 2020-05-09 RX ADMIN — MESALAMINE 1200 MG: 400 CAPSULE, DELAYED RELEASE ORAL at 15:25

## 2020-05-09 RX ADMIN — FLUOXETINE 20 MG: 20 CAPSULE ORAL at 08:01

## 2020-05-09 RX ADMIN — ATORVASTATIN CALCIUM 20 MG: 20 TABLET, FILM COATED ORAL at 15:31

## 2020-05-09 RX ADMIN — CYANOCOBALAMIN TAB 1000 MCG 500 MCG: 1000 TAB at 08:01

## 2020-05-09 RX ADMIN — MESALAMINE 1200 MG: 400 CAPSULE, DELAYED RELEASE ORAL at 21:28

## 2020-05-09 RX ADMIN — CARVEDILOL 3.12 MG: 3.12 TABLET, FILM COATED ORAL at 07:53

## 2020-05-09 RX ADMIN — OLANZAPINE 10 MG: 10 TABLET, FILM COATED ORAL at 21:28

## 2020-05-09 RX ADMIN — GABAPENTIN 400 MG: 400 CAPSULE ORAL at 21:28

## 2020-05-09 RX ADMIN — PANTOPRAZOLE SODIUM 40 MG: 40 TABLET, DELAYED RELEASE ORAL at 05:42

## 2020-05-09 RX ADMIN — LEVOTHYROXINE SODIUM 88 MCG: 88 TABLET ORAL at 05:42

## 2020-05-10 RX ADMIN — OLANZAPINE 10 MG: 10 TABLET, FILM COATED ORAL at 22:00

## 2020-05-10 RX ADMIN — CYANOCOBALAMIN TAB 1000 MCG 500 MCG: 1000 TAB at 08:02

## 2020-05-10 RX ADMIN — GABAPENTIN 400 MG: 400 CAPSULE ORAL at 22:00

## 2020-05-10 RX ADMIN — FLUOXETINE 20 MG: 20 CAPSULE ORAL at 08:02

## 2020-05-10 RX ADMIN — LEVOTHYROXINE SODIUM 88 MCG: 88 TABLET ORAL at 05:30

## 2020-05-10 RX ADMIN — ATORVASTATIN CALCIUM 20 MG: 20 TABLET, FILM COATED ORAL at 15:48

## 2020-05-10 RX ADMIN — CARVEDILOL 3.12 MG: 3.12 TABLET, FILM COATED ORAL at 07:37

## 2020-05-10 RX ADMIN — MESALAMINE 1200 MG: 400 CAPSULE, DELAYED RELEASE ORAL at 15:47

## 2020-05-10 RX ADMIN — GABAPENTIN 400 MG: 400 CAPSULE ORAL at 15:48

## 2020-05-10 RX ADMIN — PANTOPRAZOLE SODIUM 40 MG: 40 TABLET, DELAYED RELEASE ORAL at 05:30

## 2020-05-10 RX ADMIN — GABAPENTIN 400 MG: 400 CAPSULE ORAL at 07:37

## 2020-05-10 RX ADMIN — CARVEDILOL 3.12 MG: 3.12 TABLET, FILM COATED ORAL at 15:48

## 2020-05-10 RX ADMIN — MESALAMINE 1200 MG: 400 CAPSULE, DELAYED RELEASE ORAL at 07:36

## 2020-05-10 RX ADMIN — MESALAMINE 1200 MG: 400 CAPSULE, DELAYED RELEASE ORAL at 22:00

## 2020-05-11 PROCEDURE — 99232 SBSQ HOSP IP/OBS MODERATE 35: CPT | Performed by: PSYCHIATRY & NEUROLOGY

## 2020-05-11 RX ORDER — GABAPENTIN 100 MG/1
200 CAPSULE ORAL 3 TIMES DAILY
Status: DISCONTINUED | OUTPATIENT
Start: 2020-05-11 | End: 2020-06-09 | Stop reason: HOSPADM

## 2020-05-11 RX ADMIN — GABAPENTIN 200 MG: 100 CAPSULE ORAL at 21:19

## 2020-05-11 RX ADMIN — CYANOCOBALAMIN TAB 1000 MCG 500 MCG: 1000 TAB at 08:00

## 2020-05-11 RX ADMIN — FLUOXETINE 20 MG: 20 CAPSULE ORAL at 08:01

## 2020-05-11 RX ADMIN — MESALAMINE 1200 MG: 400 CAPSULE, DELAYED RELEASE ORAL at 15:44

## 2020-05-11 RX ADMIN — CARVEDILOL 3.12 MG: 3.12 TABLET, FILM COATED ORAL at 07:56

## 2020-05-11 RX ADMIN — OLANZAPINE 10 MG: 10 TABLET, FILM COATED ORAL at 21:19

## 2020-05-11 RX ADMIN — MESALAMINE 1200 MG: 400 CAPSULE, DELAYED RELEASE ORAL at 07:57

## 2020-05-11 RX ADMIN — LEVOTHYROXINE SODIUM 88 MCG: 88 TABLET ORAL at 05:50

## 2020-05-11 RX ADMIN — GABAPENTIN 200 MG: 100 CAPSULE ORAL at 15:44

## 2020-05-11 RX ADMIN — MESALAMINE 1200 MG: 400 CAPSULE, DELAYED RELEASE ORAL at 21:19

## 2020-05-11 RX ADMIN — GABAPENTIN 400 MG: 400 CAPSULE ORAL at 07:57

## 2020-05-11 RX ADMIN — ATORVASTATIN CALCIUM 20 MG: 20 TABLET, FILM COATED ORAL at 15:44

## 2020-05-11 RX ADMIN — PANTOPRAZOLE SODIUM 40 MG: 40 TABLET, DELAYED RELEASE ORAL at 05:50

## 2020-05-11 RX ADMIN — CARVEDILOL 3.12 MG: 3.12 TABLET, FILM COATED ORAL at 15:44

## 2020-05-12 ENCOUNTER — ANESTHESIA EVENT (INPATIENT)
Dept: PREOP/PACU | Facility: HOSPITAL | Age: 70
DRG: 885 | End: 2020-05-12
Payer: COMMERCIAL

## 2020-05-12 PROCEDURE — 99232 SBSQ HOSP IP/OBS MODERATE 35: CPT | Performed by: PSYCHIATRY & NEUROLOGY

## 2020-05-12 RX ADMIN — CARVEDILOL 3.12 MG: 3.12 TABLET, FILM COATED ORAL at 08:26

## 2020-05-12 RX ADMIN — MESALAMINE 1200 MG: 400 CAPSULE, DELAYED RELEASE ORAL at 17:27

## 2020-05-12 RX ADMIN — LEVOTHYROXINE SODIUM 88 MCG: 88 TABLET ORAL at 06:01

## 2020-05-12 RX ADMIN — CYANOCOBALAMIN TAB 1000 MCG 500 MCG: 1000 TAB at 08:26

## 2020-05-12 RX ADMIN — MESALAMINE 1200 MG: 400 CAPSULE, DELAYED RELEASE ORAL at 21:26

## 2020-05-12 RX ADMIN — PANTOPRAZOLE SODIUM 40 MG: 40 TABLET, DELAYED RELEASE ORAL at 06:02

## 2020-05-12 RX ADMIN — FLUOXETINE 20 MG: 20 CAPSULE ORAL at 08:27

## 2020-05-12 RX ADMIN — GABAPENTIN 200 MG: 100 CAPSULE ORAL at 17:28

## 2020-05-12 RX ADMIN — OLANZAPINE 10 MG: 10 TABLET, FILM COATED ORAL at 21:26

## 2020-05-12 RX ADMIN — GABAPENTIN 200 MG: 100 CAPSULE ORAL at 21:41

## 2020-05-12 RX ADMIN — MIRTAZAPINE 7.5 MG: 7.5 TABLET, FILM COATED ORAL at 21:26

## 2020-05-12 RX ADMIN — GABAPENTIN 200 MG: 100 CAPSULE ORAL at 08:26

## 2020-05-12 RX ADMIN — MESALAMINE 1200 MG: 400 CAPSULE, DELAYED RELEASE ORAL at 08:26

## 2020-05-12 RX ADMIN — ATORVASTATIN CALCIUM 20 MG: 20 TABLET, FILM COATED ORAL at 17:28

## 2020-05-12 RX ADMIN — CARVEDILOL 3.12 MG: 3.12 TABLET, FILM COATED ORAL at 17:28

## 2020-05-13 ENCOUNTER — APPOINTMENT (INPATIENT)
Dept: RADIOLOGY | Facility: HOSPITAL | Age: 70
DRG: 885 | End: 2020-05-13
Payer: COMMERCIAL

## 2020-05-13 ENCOUNTER — ANESTHESIA (INPATIENT)
Dept: PREOP/PACU | Facility: HOSPITAL | Age: 70
DRG: 885 | End: 2020-05-13
Payer: COMMERCIAL

## 2020-05-13 LAB
ALBUMIN SERPL BCP-MCNC: 3.9 G/DL (ref 3–5.2)
ALP SERPL-CCNC: 105 U/L (ref 43–122)
ALT SERPL W P-5'-P-CCNC: 23 U/L (ref 9–52)
ANION GAP SERPL CALCULATED.3IONS-SCNC: 10 MMOL/L (ref 5–14)
AST SERPL W P-5'-P-CCNC: 55 U/L (ref 17–59)
BACTERIA UR QL AUTO: ABNORMAL /HPF
BASOPHILS # BLD AUTO: 0 THOUSANDS/ΜL (ref 0–0.1)
BASOPHILS NFR BLD AUTO: 1 % (ref 0–1)
BILIRUB SERPL-MCNC: 1.2 MG/DL
BILIRUB UR QL STRIP: NEGATIVE
BUN SERPL-MCNC: 16 MG/DL (ref 5–25)
CALCIUM SERPL-MCNC: 9.2 MG/DL (ref 8.4–10.2)
CHLORIDE SERPL-SCNC: 99 MMOL/L (ref 97–108)
CLARITY UR: CLEAR
CO2 SERPL-SCNC: 28 MMOL/L (ref 22–30)
COLOR UR: ABNORMAL
CREAT SERPL-MCNC: 0.76 MG/DL (ref 0.7–1.5)
EOSINOPHIL # BLD AUTO: 0 THOUSAND/ΜL (ref 0–0.4)
EOSINOPHIL NFR BLD AUTO: 1 % (ref 0–6)
ERYTHROCYTE [DISTWIDTH] IN BLOOD BY AUTOMATED COUNT: 14.4 %
GFR SERPL CREATININE-BSD FRML MDRD: 94 ML/MIN/1.73SQ M
GLUCOSE P FAST SERPL-MCNC: 139 MG/DL (ref 70–99)
GLUCOSE SERPL-MCNC: 139 MG/DL (ref 70–99)
GLUCOSE UR STRIP-MCNC: NEGATIVE MG/DL
HCT VFR BLD AUTO: 41.8 % (ref 41–53)
HGB BLD-MCNC: 13.9 G/DL (ref 13.5–17.5)
HGB UR QL STRIP.AUTO: NEGATIVE
KETONES UR STRIP-MCNC: NEGATIVE MG/DL
LACTATE SERPL-SCNC: 1.5 MMOL/L (ref 0.7–2)
LEUKOCYTE ESTERASE UR QL STRIP: NEGATIVE
LYMPHOCYTES # BLD AUTO: 0.3 THOUSANDS/ΜL (ref 0.5–4)
LYMPHOCYTES NFR BLD AUTO: 8 % (ref 25–45)
MCH RBC QN AUTO: 29.9 PG (ref 26–34)
MCHC RBC AUTO-ENTMCNC: 33.3 G/DL (ref 31–36)
MCV RBC AUTO: 90 FL (ref 80–100)
MONOCYTES # BLD AUTO: 0.4 THOUSAND/ΜL (ref 0.2–0.9)
MONOCYTES NFR BLD AUTO: 9 % (ref 1–10)
MUCOUS THREADS UR QL AUTO: ABNORMAL
NEUTROPHILS # BLD AUTO: 3.2 THOUSANDS/ΜL (ref 1.8–7.8)
NEUTS SEG NFR BLD AUTO: 82 % (ref 45–65)
NITRITE UR QL STRIP: NEGATIVE
NON-SQ EPI CELLS URNS QL MICRO: ABNORMAL /HPF
PH UR STRIP.AUTO: 6 [PH]
PLATELET # BLD AUTO: 259 THOUSANDS/UL (ref 150–450)
PMV BLD AUTO: 8 FL (ref 8.9–12.7)
POTASSIUM SERPL-SCNC: 4.6 MMOL/L (ref 3.6–5)
PROCALCITONIN SERPL-MCNC: 0.1 NG/ML
PROT SERPL-MCNC: 8.6 G/DL (ref 5.9–8.4)
PROT UR STRIP-MCNC: ABNORMAL MG/DL
RBC # BLD AUTO: 4.65 MILLION/UL (ref 4.5–5.9)
RBC #/AREA URNS AUTO: ABNORMAL /HPF
SARS-COV-2 RNA RESP QL NAA+PROBE: NEGATIVE
SODIUM SERPL-SCNC: 137 MMOL/L (ref 137–147)
SP GR UR STRIP.AUTO: 1.01 (ref 1–1.04)
UROBILINOGEN UA: NEGATIVE MG/DL
WBC # BLD AUTO: 4 THOUSAND/UL (ref 4.5–11)
WBC #/AREA URNS AUTO: ABNORMAL /HPF

## 2020-05-13 PROCEDURE — 81001 URINALYSIS AUTO W/SCOPE: CPT | Performed by: NURSE PRACTITIONER

## 2020-05-13 PROCEDURE — 83605 ASSAY OF LACTIC ACID: CPT | Performed by: NURSE PRACTITIONER

## 2020-05-13 PROCEDURE — 87040 BLOOD CULTURE FOR BACTERIA: CPT | Performed by: NURSE PRACTITIONER

## 2020-05-13 PROCEDURE — 71045 X-RAY EXAM CHEST 1 VIEW: CPT

## 2020-05-13 PROCEDURE — 85025 COMPLETE CBC W/AUTO DIFF WBC: CPT | Performed by: NURSE PRACTITIONER

## 2020-05-13 PROCEDURE — 84145 PROCALCITONIN (PCT): CPT | Performed by: NURSE PRACTITIONER

## 2020-05-13 PROCEDURE — 87635 SARS-COV-2 COVID-19 AMP PRB: CPT | Performed by: NURSE PRACTITIONER

## 2020-05-13 PROCEDURE — 81003 URINALYSIS AUTO W/O SCOPE: CPT | Performed by: NURSE PRACTITIONER

## 2020-05-13 PROCEDURE — 99232 SBSQ HOSP IP/OBS MODERATE 35: CPT | Performed by: INTERNAL MEDICINE

## 2020-05-13 PROCEDURE — 80053 COMPREHEN METABOLIC PANEL: CPT | Performed by: NURSE PRACTITIONER

## 2020-05-13 PROCEDURE — 99232 SBSQ HOSP IP/OBS MODERATE 35: CPT | Performed by: NURSE PRACTITIONER

## 2020-05-13 RX ORDER — VANCOMYCIN HYDROCHLORIDE 1 G/200ML
15 INJECTION, SOLUTION INTRAVENOUS ONCE
Status: DISCONTINUED | OUTPATIENT
Start: 2020-05-13 | End: 2020-05-13

## 2020-05-13 RX ORDER — CEFEPIME HYDROCHLORIDE 2 G/50ML
2000 INJECTION, SOLUTION INTRAVENOUS ONCE
Status: COMPLETED | OUTPATIENT
Start: 2020-05-13 | End: 2020-05-13

## 2020-05-13 RX ORDER — ACETAMINOPHEN 325 MG/1
650 TABLET ORAL EVERY 6 HOURS PRN
Status: DISCONTINUED | OUTPATIENT
Start: 2020-05-13 | End: 2020-06-09 | Stop reason: HOSPADM

## 2020-05-13 RX ORDER — CEFEPIME HYDROCHLORIDE 2 G/50ML
2000 INJECTION, SOLUTION INTRAVENOUS ONCE
Status: DISCONTINUED | OUTPATIENT
Start: 2020-05-13 | End: 2020-05-13

## 2020-05-13 RX ORDER — HYDROCORTISONE ACETATE 25 MG/1
25 SUPPOSITORY RECTAL 2 TIMES DAILY
Status: DISCONTINUED | OUTPATIENT
Start: 2020-05-13 | End: 2020-06-09 | Stop reason: HOSPADM

## 2020-05-13 RX ORDER — VANCOMYCIN HYDROCHLORIDE 1 G/200ML
15 INJECTION, SOLUTION INTRAVENOUS ONCE
Status: COMPLETED | OUTPATIENT
Start: 2020-05-13 | End: 2020-05-13

## 2020-05-13 RX ADMIN — SODIUM CHLORIDE, SODIUM LACTATE, POTASSIUM CHLORIDE, AND CALCIUM CHLORIDE 1000 ML: .6; .31; .03; .02 INJECTION, SOLUTION INTRAVENOUS at 07:33

## 2020-05-13 RX ADMIN — MESALAMINE 1200 MG: 400 CAPSULE, DELAYED RELEASE ORAL at 17:11

## 2020-05-13 RX ADMIN — ATORVASTATIN CALCIUM 20 MG: 20 TABLET, FILM COATED ORAL at 17:10

## 2020-05-13 RX ADMIN — CARVEDILOL 3.12 MG: 3.12 TABLET, FILM COATED ORAL at 17:10

## 2020-05-13 RX ADMIN — OLANZAPINE 10 MG: 10 TABLET, FILM COATED ORAL at 21:02

## 2020-05-13 RX ADMIN — ACETAMINOPHEN 650 MG: 325 TABLET ORAL at 07:27

## 2020-05-13 RX ADMIN — HYDROCORTISONE ACETATE 25 MG: 25 SUPPOSITORY RECTAL at 17:15

## 2020-05-13 RX ADMIN — GABAPENTIN 200 MG: 100 CAPSULE ORAL at 17:10

## 2020-05-13 RX ADMIN — ONDANSETRON 4 MG: 4 TABLET, ORALLY DISINTEGRATING ORAL at 16:49

## 2020-05-13 RX ADMIN — MESALAMINE 1200 MG: 400 CAPSULE, DELAYED RELEASE ORAL at 09:40

## 2020-05-13 RX ADMIN — HYDROXYZINE HYDROCHLORIDE 25 MG: 25 TABLET, FILM COATED ORAL at 21:02

## 2020-05-13 RX ADMIN — CYANOCOBALAMIN TAB 1000 MCG 500 MCG: 1000 TAB at 09:41

## 2020-05-13 RX ADMIN — FLUOXETINE 20 MG: 20 CAPSULE ORAL at 09:40

## 2020-05-13 RX ADMIN — CEFEPIME HYDROCHLORIDE 2000 MG: 2 INJECTION, SOLUTION INTRAVENOUS at 07:47

## 2020-05-13 RX ADMIN — VANCOMYCIN HYDROCHLORIDE 1000 MG: 1 INJECTION, SOLUTION INTRAVENOUS at 08:40

## 2020-05-13 RX ADMIN — PANTOPRAZOLE SODIUM 40 MG: 40 TABLET, DELAYED RELEASE ORAL at 07:27

## 2020-05-13 RX ADMIN — LEVOTHYROXINE SODIUM 88 MCG: 88 TABLET ORAL at 07:26

## 2020-05-13 RX ADMIN — GABAPENTIN 200 MG: 100 CAPSULE ORAL at 21:02

## 2020-05-13 RX ADMIN — MESALAMINE 1200 MG: 400 CAPSULE, DELAYED RELEASE ORAL at 21:02

## 2020-05-13 RX ADMIN — GABAPENTIN 200 MG: 100 CAPSULE ORAL at 09:40

## 2020-05-14 ENCOUNTER — ANESTHESIA EVENT (INPATIENT)
Dept: PREOP/PACU | Facility: HOSPITAL | Age: 70
DRG: 885 | End: 2020-05-14
Payer: COMMERCIAL

## 2020-05-14 PROCEDURE — 99231 SBSQ HOSP IP/OBS SF/LOW 25: CPT | Performed by: INTERNAL MEDICINE

## 2020-05-14 PROCEDURE — 87081 CULTURE SCREEN ONLY: CPT | Performed by: NURSE PRACTITIONER

## 2020-05-14 PROCEDURE — 99232 SBSQ HOSP IP/OBS MODERATE 35: CPT | Performed by: PSYCHIATRY & NEUROLOGY

## 2020-05-14 RX ADMIN — GABAPENTIN 200 MG: 100 CAPSULE ORAL at 17:17

## 2020-05-14 RX ADMIN — ATORVASTATIN CALCIUM 20 MG: 20 TABLET, FILM COATED ORAL at 17:17

## 2020-05-14 RX ADMIN — GABAPENTIN 200 MG: 100 CAPSULE ORAL at 22:20

## 2020-05-14 RX ADMIN — MESALAMINE 1200 MG: 400 CAPSULE, DELAYED RELEASE ORAL at 22:20

## 2020-05-14 RX ADMIN — CARVEDILOL 3.12 MG: 3.12 TABLET, FILM COATED ORAL at 17:17

## 2020-05-14 RX ADMIN — FLUOXETINE 20 MG: 20 CAPSULE ORAL at 08:12

## 2020-05-14 RX ADMIN — PANTOPRAZOLE SODIUM 40 MG: 40 TABLET, DELAYED RELEASE ORAL at 06:14

## 2020-05-14 RX ADMIN — CARVEDILOL 3.12 MG: 3.12 TABLET, FILM COATED ORAL at 08:12

## 2020-05-14 RX ADMIN — ACETAMINOPHEN 650 MG: 325 TABLET ORAL at 22:41

## 2020-05-14 RX ADMIN — LEVOTHYROXINE SODIUM 88 MCG: 88 TABLET ORAL at 06:14

## 2020-05-14 RX ADMIN — CYANOCOBALAMIN TAB 1000 MCG 500 MCG: 1000 TAB at 08:13

## 2020-05-14 RX ADMIN — MESALAMINE 1200 MG: 400 CAPSULE, DELAYED RELEASE ORAL at 08:13

## 2020-05-14 RX ADMIN — HYDROCORTISONE ACETATE 25 MG: 25 SUPPOSITORY RECTAL at 18:40

## 2020-05-14 RX ADMIN — HYDROCORTISONE ACETATE 25 MG: 25 SUPPOSITORY RECTAL at 08:52

## 2020-05-14 RX ADMIN — GABAPENTIN 200 MG: 100 CAPSULE ORAL at 08:13

## 2020-05-14 RX ADMIN — MESALAMINE 1200 MG: 400 CAPSULE, DELAYED RELEASE ORAL at 17:17

## 2020-05-14 RX ADMIN — OLANZAPINE 10 MG: 10 TABLET, FILM COATED ORAL at 22:20

## 2020-05-15 ENCOUNTER — APPOINTMENT (INPATIENT)
Dept: CT IMAGING | Facility: HOSPITAL | Age: 70
DRG: 885 | End: 2020-05-15
Payer: COMMERCIAL

## 2020-05-15 ENCOUNTER — APPOINTMENT (INPATIENT)
Dept: INTERVENTIONAL RADIOLOGY/VASCULAR | Facility: HOSPITAL | Age: 70
DRG: 885 | End: 2020-05-15
Attending: PSYCHIATRY & NEUROLOGY
Payer: COMMERCIAL

## 2020-05-15 ENCOUNTER — ANESTHESIA (INPATIENT)
Dept: PREOP/PACU | Facility: HOSPITAL | Age: 70
DRG: 885 | End: 2020-05-15
Payer: COMMERCIAL

## 2020-05-15 ENCOUNTER — APPOINTMENT (INPATIENT)
Dept: RADIOLOGY | Facility: HOSPITAL | Age: 70
DRG: 885 | End: 2020-05-15
Payer: COMMERCIAL

## 2020-05-15 ENCOUNTER — APPOINTMENT (INPATIENT)
Dept: PREOP/PACU | Facility: HOSPITAL | Age: 70
DRG: 885 | End: 2020-05-15
Payer: COMMERCIAL

## 2020-05-15 LAB
ALBUMIN SERPL BCP-MCNC: 3.1 G/DL (ref 3–5.2)
ALP SERPL-CCNC: 113 U/L (ref 43–122)
ALT SERPL W P-5'-P-CCNC: 31 U/L (ref 9–52)
ANION GAP SERPL CALCULATED.3IONS-SCNC: 8 MMOL/L (ref 5–14)
AST SERPL W P-5'-P-CCNC: 40 U/L (ref 17–59)
BASOPHILS # BLD AUTO: 0 THOUSANDS/ΜL (ref 0–0.1)
BASOPHILS NFR BLD AUTO: 1 % (ref 0–1)
BILIRUB SERPL-MCNC: 0.6 MG/DL
BUN SERPL-MCNC: 13 MG/DL (ref 5–25)
CALCIUM SERPL-MCNC: 8.2 MG/DL (ref 8.4–10.2)
CHLORIDE SERPL-SCNC: 99 MMOL/L (ref 97–108)
CO2 SERPL-SCNC: 28 MMOL/L (ref 22–30)
CREAT SERPL-MCNC: 0.71 MG/DL (ref 0.7–1.5)
EOSINOPHIL # BLD AUTO: 0 THOUSAND/ΜL (ref 0–0.4)
EOSINOPHIL NFR BLD AUTO: 1 % (ref 0–6)
ERYTHROCYTE [DISTWIDTH] IN BLOOD BY AUTOMATED COUNT: 14.1 %
GFR SERPL CREATININE-BSD FRML MDRD: 97 ML/MIN/1.73SQ M
GLUCOSE SERPL-MCNC: 159 MG/DL (ref 70–99)
HCT VFR BLD AUTO: 34 % (ref 41–53)
HGB BLD-MCNC: 11.6 G/DL (ref 13.5–17.5)
LYMPHOCYTES # BLD AUTO: 0.3 THOUSANDS/ΜL (ref 0.5–4)
LYMPHOCYTES NFR BLD AUTO: 8 % (ref 25–45)
MCH RBC QN AUTO: 30.6 PG (ref 26–34)
MCHC RBC AUTO-ENTMCNC: 34.1 G/DL (ref 31–36)
MCV RBC AUTO: 90 FL (ref 80–100)
MONOCYTES # BLD AUTO: 0.2 THOUSAND/ΜL (ref 0.2–0.9)
MONOCYTES NFR BLD AUTO: 6 % (ref 1–10)
NEUTROPHILS # BLD AUTO: 3 THOUSANDS/ΜL (ref 1.8–7.8)
NEUTS SEG NFR BLD AUTO: 85 % (ref 45–65)
PLATELET # BLD AUTO: 194 THOUSANDS/UL (ref 150–450)
PMV BLD AUTO: 8 FL (ref 8.9–12.7)
POTASSIUM SERPL-SCNC: 4 MMOL/L (ref 3.6–5)
PROT SERPL-MCNC: 6.6 G/DL (ref 5.9–8.4)
RBC # BLD AUTO: 3.79 MILLION/UL (ref 4.5–5.9)
SODIUM SERPL-SCNC: 135 MMOL/L (ref 137–147)
WBC # BLD AUTO: 3.5 THOUSAND/UL (ref 4.5–11)

## 2020-05-15 PROCEDURE — 80053 COMPREHEN METABOLIC PANEL: CPT | Performed by: INTERNAL MEDICINE

## 2020-05-15 PROCEDURE — 90870 ELECTROCONVULSIVE THERAPY: CPT | Performed by: PSYCHIATRY & NEUROLOGY

## 2020-05-15 PROCEDURE — 90870 ELECTROCONVULSIVE THERAPY: CPT

## 2020-05-15 PROCEDURE — 05HY33Z INSERTION OF INFUSION DEVICE INTO UPPER VEIN, PERCUTANEOUS APPROACH: ICD-10-PCS | Performed by: PSYCHIATRY & NEUROLOGY

## 2020-05-15 PROCEDURE — 85025 COMPLETE CBC W/AUTO DIFF WBC: CPT | Performed by: INTERNAL MEDICINE

## 2020-05-15 PROCEDURE — 76937 US GUIDE VASCULAR ACCESS: CPT

## 2020-05-15 PROCEDURE — GZB2ZZZ ELECTROCONVULSIVE THERAPY, BILATERAL-SINGLE SEIZURE: ICD-10-PCS | Performed by: PSYCHIATRY & NEUROLOGY

## 2020-05-15 PROCEDURE — 99232 SBSQ HOSP IP/OBS MODERATE 35: CPT | Performed by: INTERNAL MEDICINE

## 2020-05-15 PROCEDURE — 74176 CT ABD & PELVIS W/O CONTRAST: CPT

## 2020-05-15 PROCEDURE — C1751 CATH, INF, PER/CENT/MIDLINE: HCPCS

## 2020-05-15 PROCEDURE — 99232 SBSQ HOSP IP/OBS MODERATE 35: CPT | Performed by: NURSE PRACTITIONER

## 2020-05-15 PROCEDURE — 71046 X-RAY EXAM CHEST 2 VIEWS: CPT

## 2020-05-15 PROCEDURE — NC001 PR NO CHARGE: Performed by: PSYCHIATRY & NEUROLOGY

## 2020-05-15 RX ORDER — SODIUM CHLORIDE 9 MG/ML
125 INJECTION, SOLUTION INTRAVENOUS CONTINUOUS
Status: DISCONTINUED | OUTPATIENT
Start: 2020-05-15 | End: 2020-05-15

## 2020-05-15 RX ORDER — SODIUM CHLORIDE 9 MG/ML
INJECTION, SOLUTION INTRAVENOUS CONTINUOUS PRN
Status: DISCONTINUED | OUTPATIENT
Start: 2020-05-15 | End: 2020-05-15 | Stop reason: SURG

## 2020-05-15 RX ORDER — GLYCOPYRROLATE 0.2 MG/ML
INJECTION INTRAMUSCULAR; INTRAVENOUS AS NEEDED
Status: DISCONTINUED | OUTPATIENT
Start: 2020-05-15 | End: 2020-05-15 | Stop reason: SURG

## 2020-05-15 RX ORDER — METRONIDAZOLE 500 MG/1
500 TABLET ORAL EVERY 8 HOURS SCHEDULED
Status: DISCONTINUED | OUTPATIENT
Start: 2020-05-15 | End: 2020-05-19

## 2020-05-15 RX ORDER — ESMOLOL HYDROCHLORIDE 10 MG/ML
INJECTION INTRAVENOUS AS NEEDED
Status: DISCONTINUED | OUTPATIENT
Start: 2020-05-15 | End: 2020-05-15 | Stop reason: SURG

## 2020-05-15 RX ORDER — SUCCINYLCHOLINE/SOD CL,ISO/PF 100 MG/5ML
SYRINGE (ML) INTRAVENOUS AS NEEDED
Status: DISCONTINUED | OUTPATIENT
Start: 2020-05-15 | End: 2020-05-15 | Stop reason: SURG

## 2020-05-15 RX ORDER — LEVOFLOXACIN 750 MG/1
750 TABLET ORAL EVERY 24 HOURS
Status: DISCONTINUED | OUTPATIENT
Start: 2020-05-15 | End: 2020-05-19

## 2020-05-15 RX ADMIN — PANTOPRAZOLE SODIUM 40 MG: 40 TABLET, DELAYED RELEASE ORAL at 10:11

## 2020-05-15 RX ADMIN — GABAPENTIN 200 MG: 100 CAPSULE ORAL at 16:18

## 2020-05-15 RX ADMIN — LEVOFLOXACIN 750 MG: 750 TABLET, FILM COATED ORAL at 16:19

## 2020-05-15 RX ADMIN — HYDROCORTISONE ACETATE 25 MG: 25 SUPPOSITORY RECTAL at 10:20

## 2020-05-15 RX ADMIN — GABAPENTIN 200 MG: 100 CAPSULE ORAL at 21:44

## 2020-05-15 RX ADMIN — LEVOTHYROXINE SODIUM 88 MCG: 88 TABLET ORAL at 10:11

## 2020-05-15 RX ADMIN — CARVEDILOL 3.12 MG: 3.12 TABLET, FILM COATED ORAL at 07:33

## 2020-05-15 RX ADMIN — ATORVASTATIN CALCIUM 20 MG: 20 TABLET, FILM COATED ORAL at 17:19

## 2020-05-15 RX ADMIN — MESALAMINE 1200 MG: 400 CAPSULE, DELAYED RELEASE ORAL at 10:10

## 2020-05-15 RX ADMIN — HYDROCORTISONE ACETATE 25 MG: 25 SUPPOSITORY RECTAL at 17:19

## 2020-05-15 RX ADMIN — GLYCOPYRROLATE 0.2 MG: 0.2 INJECTION, SOLUTION INTRAMUSCULAR; INTRAVENOUS at 09:26

## 2020-05-15 RX ADMIN — Medication 100 MG: at 09:33

## 2020-05-15 RX ADMIN — MESALAMINE 1200 MG: 400 CAPSULE, DELAYED RELEASE ORAL at 21:44

## 2020-05-15 RX ADMIN — METRONIDAZOLE 500 MG: 500 TABLET, FILM COATED ORAL at 16:19

## 2020-05-15 RX ADMIN — ESMOLOL HYDROCHLORIDE 50 MG: 10 INJECTION, SOLUTION INTRAVENOUS at 09:33

## 2020-05-15 RX ADMIN — OLANZAPINE 10 MG: 10 TABLET, FILM COATED ORAL at 21:44

## 2020-05-15 RX ADMIN — METRONIDAZOLE 500 MG: 500 TABLET, FILM COATED ORAL at 21:46

## 2020-05-15 RX ADMIN — GABAPENTIN 200 MG: 100 CAPSULE ORAL at 10:11

## 2020-05-15 RX ADMIN — SODIUM CHLORIDE: 0.9 INJECTION, SOLUTION INTRAVENOUS at 09:16

## 2020-05-15 RX ADMIN — ACETAMINOPHEN 650 MG: 325 TABLET ORAL at 15:13

## 2020-05-15 RX ADMIN — MESALAMINE 1200 MG: 400 CAPSULE, DELAYED RELEASE ORAL at 16:18

## 2020-05-15 RX ADMIN — FLUOXETINE 20 MG: 20 CAPSULE ORAL at 10:10

## 2020-05-15 RX ADMIN — CYANOCOBALAMIN TAB 1000 MCG 500 MCG: 1000 TAB at 10:11

## 2020-05-15 RX ADMIN — Medication 100 MG: at 09:31

## 2020-05-16 LAB
BASOPHILS # BLD AUTO: 0 THOUSANDS/ΜL (ref 0–0.1)
BASOPHILS NFR BLD AUTO: 1 % (ref 0–1)
EOSINOPHIL # BLD AUTO: 0 THOUSAND/ΜL (ref 0–0.4)
EOSINOPHIL NFR BLD AUTO: 1 % (ref 0–6)
ERYTHROCYTE [DISTWIDTH] IN BLOOD BY AUTOMATED COUNT: 14.6 %
HCT VFR BLD AUTO: 34.3 % (ref 41–53)
HGB BLD-MCNC: 11.8 G/DL (ref 13.5–17.5)
LYMPHOCYTES # BLD AUTO: 0.5 THOUSANDS/ΜL (ref 0.5–4)
LYMPHOCYTES NFR BLD AUTO: 12 % (ref 25–45)
MCH RBC QN AUTO: 30.3 PG (ref 26–34)
MCHC RBC AUTO-ENTMCNC: 34.4 G/DL (ref 31–36)
MCV RBC AUTO: 88 FL (ref 80–100)
MONOCYTES # BLD AUTO: 0.3 THOUSAND/ΜL (ref 0.2–0.9)
MONOCYTES NFR BLD AUTO: 7 % (ref 1–10)
MRSA NOSE QL CULT: NORMAL
NEUTROPHILS # BLD AUTO: 3.2 THOUSANDS/ΜL (ref 1.8–7.8)
NEUTS SEG NFR BLD AUTO: 79 % (ref 45–65)
PLATELET # BLD AUTO: 204 THOUSANDS/UL (ref 150–450)
PMV BLD AUTO: 7.8 FL (ref 8.9–12.7)
RBC # BLD AUTO: 3.9 MILLION/UL (ref 4.5–5.9)
WBC # BLD AUTO: 4 THOUSAND/UL (ref 4.5–11)

## 2020-05-16 PROCEDURE — 99233 SBSQ HOSP IP/OBS HIGH 50: CPT | Performed by: PHYSICIAN ASSISTANT

## 2020-05-16 PROCEDURE — 85025 COMPLETE CBC W/AUTO DIFF WBC: CPT | Performed by: INTERNAL MEDICINE

## 2020-05-16 RX ORDER — IBUPROFEN 600 MG/1
600 TABLET ORAL EVERY 6 HOURS PRN
Status: DISCONTINUED | OUTPATIENT
Start: 2020-05-16 | End: 2020-06-09 | Stop reason: HOSPADM

## 2020-05-16 RX ADMIN — METRONIDAZOLE 500 MG: 500 TABLET, FILM COATED ORAL at 14:18

## 2020-05-16 RX ADMIN — LEVOTHYROXINE SODIUM 88 MCG: 88 TABLET ORAL at 05:26

## 2020-05-16 RX ADMIN — HYDROCORTISONE ACETATE 25 MG: 25 SUPPOSITORY RECTAL at 08:16

## 2020-05-16 RX ADMIN — PANTOPRAZOLE SODIUM 40 MG: 40 TABLET, DELAYED RELEASE ORAL at 05:26

## 2020-05-16 RX ADMIN — LEVOFLOXACIN 750 MG: 750 TABLET, FILM COATED ORAL at 16:26

## 2020-05-16 RX ADMIN — GABAPENTIN 200 MG: 100 CAPSULE ORAL at 16:27

## 2020-05-16 RX ADMIN — FLUOXETINE 20 MG: 20 CAPSULE ORAL at 08:16

## 2020-05-16 RX ADMIN — MESALAMINE 1200 MG: 400 CAPSULE, DELAYED RELEASE ORAL at 21:31

## 2020-05-16 RX ADMIN — CARVEDILOL 3.12 MG: 3.12 TABLET, FILM COATED ORAL at 17:26

## 2020-05-16 RX ADMIN — HYDROCORTISONE ACETATE 25 MG: 25 SUPPOSITORY RECTAL at 17:26

## 2020-05-16 RX ADMIN — CYANOCOBALAMIN TAB 1000 MCG 500 MCG: 1000 TAB at 08:16

## 2020-05-16 RX ADMIN — MESALAMINE 1200 MG: 400 CAPSULE, DELAYED RELEASE ORAL at 16:26

## 2020-05-16 RX ADMIN — METRONIDAZOLE 500 MG: 500 TABLET, FILM COATED ORAL at 05:26

## 2020-05-16 RX ADMIN — CARVEDILOL 3.12 MG: 3.12 TABLET, FILM COATED ORAL at 08:16

## 2020-05-16 RX ADMIN — MESALAMINE 1200 MG: 400 CAPSULE, DELAYED RELEASE ORAL at 08:16

## 2020-05-16 RX ADMIN — GABAPENTIN 200 MG: 100 CAPSULE ORAL at 21:32

## 2020-05-16 RX ADMIN — OLANZAPINE 10 MG: 10 TABLET, FILM COATED ORAL at 21:31

## 2020-05-16 RX ADMIN — ATORVASTATIN CALCIUM 20 MG: 20 TABLET, FILM COATED ORAL at 17:27

## 2020-05-16 RX ADMIN — GABAPENTIN 200 MG: 100 CAPSULE ORAL at 08:16

## 2020-05-16 RX ADMIN — METRONIDAZOLE 500 MG: 500 TABLET, FILM COATED ORAL at 21:32

## 2020-05-17 ENCOUNTER — ANESTHESIA EVENT (INPATIENT)
Dept: PREOP/PACU | Facility: HOSPITAL | Age: 70
DRG: 885 | End: 2020-05-17
Payer: COMMERCIAL

## 2020-05-17 PROBLEM — K52.9 COLITIS, ACUTE: Status: ACTIVE | Noted: 2020-05-17

## 2020-05-17 PROCEDURE — 87147 CULTURE TYPE IMMUNOLOGIC: CPT | Performed by: FAMILY MEDICINE

## 2020-05-17 PROCEDURE — 99232 SBSQ HOSP IP/OBS MODERATE 35: CPT | Performed by: FAMILY MEDICINE

## 2020-05-17 PROCEDURE — 99232 SBSQ HOSP IP/OBS MODERATE 35: CPT | Performed by: PHYSICIAN ASSISTANT

## 2020-05-17 PROCEDURE — 87077 CULTURE AEROBIC IDENTIFY: CPT | Performed by: FAMILY MEDICINE

## 2020-05-17 PROCEDURE — 87186 SC STD MICRODIL/AGAR DIL: CPT | Performed by: FAMILY MEDICINE

## 2020-05-17 PROCEDURE — 87070 CULTURE OTHR SPECIMN AEROBIC: CPT | Performed by: FAMILY MEDICINE

## 2020-05-17 PROCEDURE — 87205 SMEAR GRAM STAIN: CPT | Performed by: FAMILY MEDICINE

## 2020-05-17 PROCEDURE — 99252 IP/OBS CONSLTJ NEW/EST SF 35: CPT | Performed by: SPECIALIST

## 2020-05-17 RX ADMIN — OLANZAPINE 10 MG: 10 TABLET, FILM COATED ORAL at 21:36

## 2020-05-17 RX ADMIN — ATORVASTATIN CALCIUM 20 MG: 20 TABLET, FILM COATED ORAL at 17:03

## 2020-05-17 RX ADMIN — MESALAMINE 1200 MG: 400 CAPSULE, DELAYED RELEASE ORAL at 21:36

## 2020-05-17 RX ADMIN — LEVOFLOXACIN 750 MG: 750 TABLET, FILM COATED ORAL at 16:03

## 2020-05-17 RX ADMIN — MESALAMINE 1200 MG: 400 CAPSULE, DELAYED RELEASE ORAL at 08:26

## 2020-05-17 RX ADMIN — CARVEDILOL 3.12 MG: 3.12 TABLET, FILM COATED ORAL at 17:02

## 2020-05-17 RX ADMIN — HYDROCORTISONE ACETATE 25 MG: 25 SUPPOSITORY RECTAL at 08:26

## 2020-05-17 RX ADMIN — GABAPENTIN 200 MG: 100 CAPSULE ORAL at 08:25

## 2020-05-17 RX ADMIN — METRONIDAZOLE 500 MG: 500 TABLET, FILM COATED ORAL at 21:36

## 2020-05-17 RX ADMIN — CYANOCOBALAMIN TAB 1000 MCG 500 MCG: 1000 TAB at 08:26

## 2020-05-17 RX ADMIN — MESALAMINE 1200 MG: 400 CAPSULE, DELAYED RELEASE ORAL at 16:04

## 2020-05-17 RX ADMIN — GABAPENTIN 200 MG: 100 CAPSULE ORAL at 21:36

## 2020-05-17 RX ADMIN — METRONIDAZOLE 500 MG: 500 TABLET, FILM COATED ORAL at 06:01

## 2020-05-17 RX ADMIN — HYDROCORTISONE ACETATE 25 MG: 25 SUPPOSITORY RECTAL at 17:03

## 2020-05-17 RX ADMIN — GABAPENTIN 200 MG: 100 CAPSULE ORAL at 16:02

## 2020-05-17 RX ADMIN — FLUOXETINE 20 MG: 20 CAPSULE ORAL at 08:26

## 2020-05-17 RX ADMIN — LEVOTHYROXINE SODIUM 88 MCG: 88 TABLET ORAL at 06:01

## 2020-05-17 RX ADMIN — PANTOPRAZOLE SODIUM 40 MG: 40 TABLET, DELAYED RELEASE ORAL at 06:01

## 2020-05-17 RX ADMIN — METRONIDAZOLE 500 MG: 500 TABLET, FILM COATED ORAL at 14:19

## 2020-05-18 ENCOUNTER — ANESTHESIA (INPATIENT)
Dept: PREOP/PACU | Facility: HOSPITAL | Age: 70
DRG: 885 | End: 2020-05-18
Payer: COMMERCIAL

## 2020-05-18 ENCOUNTER — APPOINTMENT (INPATIENT)
Dept: PREOP/PACU | Facility: HOSPITAL | Age: 70
DRG: 885 | End: 2020-05-18
Payer: COMMERCIAL

## 2020-05-18 LAB
ANION GAP SERPL CALCULATED.3IONS-SCNC: 8 MMOL/L (ref 5–14)
BACTERIA BLD CULT: NORMAL
BACTERIA BLD CULT: NORMAL
BUN SERPL-MCNC: 15 MG/DL (ref 5–25)
CALCIUM SERPL-MCNC: 8.8 MG/DL (ref 8.4–10.2)
CHLORIDE SERPL-SCNC: 101 MMOL/L (ref 97–108)
CO2 SERPL-SCNC: 27 MMOL/L (ref 22–30)
CREAT SERPL-MCNC: 0.69 MG/DL (ref 0.7–1.5)
CRP SERPL QL: 46.2 MG/L
ERYTHROCYTE [DISTWIDTH] IN BLOOD BY AUTOMATED COUNT: 14.5 %
ERYTHROCYTE [SEDIMENTATION RATE] IN BLOOD: 56 MM/HOUR (ref 1–20)
GFR SERPL CREATININE-BSD FRML MDRD: 98 ML/MIN/1.73SQ M
GLUCOSE P FAST SERPL-MCNC: 120 MG/DL (ref 70–99)
GLUCOSE SERPL-MCNC: 120 MG/DL (ref 70–99)
HCT VFR BLD AUTO: 35.3 % (ref 41–53)
HGB BLD-MCNC: 12 G/DL (ref 13.5–17.5)
MCH RBC QN AUTO: 29.8 PG (ref 26–34)
MCHC RBC AUTO-ENTMCNC: 33.9 G/DL (ref 31–36)
MCV RBC AUTO: 88 FL (ref 80–100)
PLATELET # BLD AUTO: 265 THOUSANDS/UL (ref 150–450)
PMV BLD AUTO: 8.1 FL (ref 8.9–12.7)
POTASSIUM SERPL-SCNC: 3.5 MMOL/L (ref 3.6–5)
RBC # BLD AUTO: 4.01 MILLION/UL (ref 4.5–5.9)
SODIUM SERPL-SCNC: 136 MMOL/L (ref 137–147)
WBC # BLD AUTO: 4.6 THOUSAND/UL (ref 4.5–11)

## 2020-05-18 PROCEDURE — 85652 RBC SED RATE AUTOMATED: CPT | Performed by: FAMILY MEDICINE

## 2020-05-18 PROCEDURE — 90870 ELECTROCONVULSIVE THERAPY: CPT

## 2020-05-18 PROCEDURE — 80048 BASIC METABOLIC PNL TOTAL CA: CPT | Performed by: FAMILY MEDICINE

## 2020-05-18 PROCEDURE — GZB2ZZZ ELECTROCONVULSIVE THERAPY, BILATERAL-SINGLE SEIZURE: ICD-10-PCS | Performed by: PSYCHIATRY & NEUROLOGY

## 2020-05-18 PROCEDURE — 86140 C-REACTIVE PROTEIN: CPT | Performed by: FAMILY MEDICINE

## 2020-05-18 PROCEDURE — 99232 SBSQ HOSP IP/OBS MODERATE 35: CPT | Performed by: PSYCHIATRY & NEUROLOGY

## 2020-05-18 PROCEDURE — 90870 ELECTROCONVULSIVE THERAPY: CPT | Performed by: PSYCHIATRY & NEUROLOGY

## 2020-05-18 PROCEDURE — 85027 COMPLETE CBC AUTOMATED: CPT | Performed by: FAMILY MEDICINE

## 2020-05-18 RX ORDER — GLYCOPYRROLATE 0.2 MG/ML
INJECTION INTRAMUSCULAR; INTRAVENOUS AS NEEDED
Status: DISCONTINUED | OUTPATIENT
Start: 2020-05-18 | End: 2020-05-18 | Stop reason: SURG

## 2020-05-18 RX ORDER — POTASSIUM CHLORIDE 20 MEQ/1
20 TABLET, EXTENDED RELEASE ORAL ONCE
Status: COMPLETED | OUTPATIENT
Start: 2020-05-18 | End: 2020-05-18

## 2020-05-18 RX ORDER — ESMOLOL HYDROCHLORIDE 10 MG/ML
INJECTION INTRAVENOUS AS NEEDED
Status: DISCONTINUED | OUTPATIENT
Start: 2020-05-18 | End: 2020-05-18 | Stop reason: SURG

## 2020-05-18 RX ORDER — SODIUM CHLORIDE 9 MG/ML
INJECTION, SOLUTION INTRAVENOUS CONTINUOUS PRN
Status: DISCONTINUED | OUTPATIENT
Start: 2020-05-18 | End: 2020-05-18 | Stop reason: SURG

## 2020-05-18 RX ORDER — ETOMIDATE 2 MG/ML
INJECTION INTRAVENOUS AS NEEDED
Status: DISCONTINUED | OUTPATIENT
Start: 2020-05-18 | End: 2020-05-18 | Stop reason: SURG

## 2020-05-18 RX ORDER — SUCCINYLCHOLINE/SOD CL,ISO/PF 100 MG/5ML
SYRINGE (ML) INTRAVENOUS AS NEEDED
Status: DISCONTINUED | OUTPATIENT
Start: 2020-05-18 | End: 2020-05-18 | Stop reason: SURG

## 2020-05-18 RX ADMIN — METRONIDAZOLE 500 MG: 500 TABLET, FILM COATED ORAL at 21:28

## 2020-05-18 RX ADMIN — OLANZAPINE 10 MG: 10 TABLET, FILM COATED ORAL at 21:28

## 2020-05-18 RX ADMIN — METRONIDAZOLE 500 MG: 500 TABLET, FILM COATED ORAL at 14:10

## 2020-05-18 RX ADMIN — GABAPENTIN 200 MG: 100 CAPSULE ORAL at 21:28

## 2020-05-18 RX ADMIN — ESMOLOL HYDROCHLORIDE 50 MG: 10 INJECTION, SOLUTION INTRAVENOUS at 07:09

## 2020-05-18 RX ADMIN — MESALAMINE 1200 MG: 400 CAPSULE, DELAYED RELEASE ORAL at 17:22

## 2020-05-18 RX ADMIN — PANTOPRAZOLE SODIUM 40 MG: 40 TABLET, DELAYED RELEASE ORAL at 09:31

## 2020-05-18 RX ADMIN — ATORVASTATIN CALCIUM 20 MG: 20 TABLET, FILM COATED ORAL at 17:20

## 2020-05-18 RX ADMIN — HYDROCORTISONE ACETATE 25 MG: 25 SUPPOSITORY RECTAL at 17:24

## 2020-05-18 RX ADMIN — POTASSIUM CHLORIDE 20 MEQ: 20 TABLET, EXTENDED RELEASE ORAL at 15:55

## 2020-05-18 RX ADMIN — ETOMIDATE 20 MG: 2 INJECTION, SOLUTION INTRAVENOUS at 07:10

## 2020-05-18 RX ADMIN — MESALAMINE 1200 MG: 400 CAPSULE, DELAYED RELEASE ORAL at 21:28

## 2020-05-18 RX ADMIN — METRONIDAZOLE 500 MG: 500 TABLET, FILM COATED ORAL at 09:30

## 2020-05-18 RX ADMIN — HYDROCORTISONE ACETATE 25 MG: 25 SUPPOSITORY RECTAL at 09:30

## 2020-05-18 RX ADMIN — SODIUM CHLORIDE: 0.9 INJECTION, SOLUTION INTRAVENOUS at 06:47

## 2020-05-18 RX ADMIN — CARVEDILOL 3.12 MG: 3.12 TABLET, FILM COATED ORAL at 06:19

## 2020-05-18 RX ADMIN — LEVOFLOXACIN 750 MG: 750 TABLET, FILM COATED ORAL at 17:21

## 2020-05-18 RX ADMIN — GLYCOPYRROLATE 0.2 MG: 0.2 INJECTION, SOLUTION INTRAMUSCULAR; INTRAVENOUS at 07:09

## 2020-05-18 RX ADMIN — LEVOTHYROXINE SODIUM 88 MCG: 88 TABLET ORAL at 09:31

## 2020-05-18 RX ADMIN — GABAPENTIN 200 MG: 100 CAPSULE ORAL at 17:20

## 2020-05-18 RX ADMIN — FLUOXETINE 20 MG: 20 CAPSULE ORAL at 09:30

## 2020-05-18 RX ADMIN — MESALAMINE 1200 MG: 400 CAPSULE, DELAYED RELEASE ORAL at 09:30

## 2020-05-18 RX ADMIN — Medication 100 MG: at 07:10

## 2020-05-18 RX ADMIN — CYANOCOBALAMIN TAB 1000 MCG 500 MCG: 1000 TAB at 09:31

## 2020-05-18 RX ADMIN — CARVEDILOL 3.12 MG: 3.12 TABLET, FILM COATED ORAL at 17:20

## 2020-05-18 RX ADMIN — GABAPENTIN 200 MG: 100 CAPSULE ORAL at 09:30

## 2020-05-19 ENCOUNTER — APPOINTMENT (INPATIENT)
Dept: CT IMAGING | Facility: HOSPITAL | Age: 70
DRG: 885 | End: 2020-05-19
Payer: COMMERCIAL

## 2020-05-19 ENCOUNTER — ANESTHESIA EVENT (OUTPATIENT)
Dept: ANESTHESIOLOGY | Facility: HOSPITAL | Age: 70
End: 2020-05-19

## 2020-05-19 ENCOUNTER — ANESTHESIA EVENT (INPATIENT)
Dept: PREOP/PACU | Facility: HOSPITAL | Age: 70
DRG: 885 | End: 2020-05-19
Payer: COMMERCIAL

## 2020-05-19 ENCOUNTER — ANESTHESIA (OUTPATIENT)
Dept: ANESTHESIOLOGY | Facility: HOSPITAL | Age: 70
End: 2020-05-19

## 2020-05-19 PROCEDURE — 74177 CT ABD & PELVIS W/CONTRAST: CPT

## 2020-05-19 PROCEDURE — 99232 SBSQ HOSP IP/OBS MODERATE 35: CPT | Performed by: PSYCHIATRY & NEUROLOGY

## 2020-05-19 RX ORDER — SACCHAROMYCES BOULARDII 250 MG
250 CAPSULE ORAL 2 TIMES DAILY
Status: DISCONTINUED | OUTPATIENT
Start: 2020-05-19 | End: 2020-06-09 | Stop reason: HOSPADM

## 2020-05-19 RX ORDER — SODIUM CHLORIDE 9 MG/ML
50 INJECTION, SOLUTION INTRAVENOUS CONTINUOUS
Status: DISCONTINUED | OUTPATIENT
Start: 2020-05-19 | End: 2020-05-23

## 2020-05-19 RX ORDER — AMOXICILLIN AND CLAVULANATE POTASSIUM 875; 125 MG/1; MG/1
1 TABLET, FILM COATED ORAL EVERY 12 HOURS SCHEDULED
Status: COMPLETED | OUTPATIENT
Start: 2020-05-19 | End: 2020-05-25

## 2020-05-19 RX ORDER — GINSENG 100 MG
1 CAPSULE ORAL 2 TIMES DAILY
Status: DISCONTINUED | OUTPATIENT
Start: 2020-05-19 | End: 2020-06-09 | Stop reason: HOSPADM

## 2020-05-19 RX ADMIN — FLUOXETINE 20 MG: 20 CAPSULE ORAL at 08:55

## 2020-05-19 RX ADMIN — CARVEDILOL 3.12 MG: 3.12 TABLET, FILM COATED ORAL at 08:54

## 2020-05-19 RX ADMIN — CARVEDILOL 3.12 MG: 3.12 TABLET, FILM COATED ORAL at 17:55

## 2020-05-19 RX ADMIN — HYDROCORTISONE ACETATE 25 MG: 25 SUPPOSITORY RECTAL at 17:57

## 2020-05-19 RX ADMIN — GABAPENTIN 200 MG: 100 CAPSULE ORAL at 17:55

## 2020-05-19 RX ADMIN — AMOXICILLIN AND CLAVULANATE POTASSIUM 1 TABLET: 875; 125 TABLET, FILM COATED ORAL at 15:24

## 2020-05-19 RX ADMIN — MESALAMINE 1200 MG: 400 CAPSULE, DELAYED RELEASE ORAL at 17:56

## 2020-05-19 RX ADMIN — BACITRACIN ZINC 1 SMALL APPLICATION: 500 OINTMENT TOPICAL at 15:25

## 2020-05-19 RX ADMIN — ATORVASTATIN CALCIUM 20 MG: 20 TABLET, FILM COATED ORAL at 17:54

## 2020-05-19 RX ADMIN — HYDROCORTISONE ACETATE 25 MG: 25 SUPPOSITORY RECTAL at 09:00

## 2020-05-19 RX ADMIN — METRONIDAZOLE 500 MG: 500 TABLET, FILM COATED ORAL at 05:42

## 2020-05-19 RX ADMIN — GABAPENTIN 200 MG: 100 CAPSULE ORAL at 21:35

## 2020-05-19 RX ADMIN — IOHEXOL 100 ML: 350 INJECTION, SOLUTION INTRAVENOUS at 12:10

## 2020-05-19 RX ADMIN — Medication 250 MG: at 18:55

## 2020-05-19 RX ADMIN — OLANZAPINE 10 MG: 10 TABLET, FILM COATED ORAL at 21:35

## 2020-05-19 RX ADMIN — MESALAMINE 1200 MG: 400 CAPSULE, DELAYED RELEASE ORAL at 08:59

## 2020-05-19 RX ADMIN — LEVOTHYROXINE SODIUM 88 MCG: 88 TABLET ORAL at 05:41

## 2020-05-19 RX ADMIN — GABAPENTIN 200 MG: 100 CAPSULE ORAL at 08:58

## 2020-05-19 RX ADMIN — Medication 250 MG: at 15:24

## 2020-05-19 RX ADMIN — AMOXICILLIN AND CLAVULANATE POTASSIUM 1 TABLET: 875; 125 TABLET, FILM COATED ORAL at 23:19

## 2020-05-19 RX ADMIN — BACITRACIN ZINC 1 SMALL APPLICATION: 500 OINTMENT TOPICAL at 19:15

## 2020-05-19 RX ADMIN — CYANOCOBALAMIN TAB 1000 MCG 500 MCG: 1000 TAB at 08:58

## 2020-05-19 RX ADMIN — MESALAMINE 1200 MG: 400 CAPSULE, DELAYED RELEASE ORAL at 21:35

## 2020-05-19 RX ADMIN — PANTOPRAZOLE SODIUM 40 MG: 40 TABLET, DELAYED RELEASE ORAL at 05:41

## 2020-05-20 ENCOUNTER — APPOINTMENT (INPATIENT)
Dept: PREOP/PACU | Facility: HOSPITAL | Age: 70
DRG: 885 | End: 2020-05-20
Payer: COMMERCIAL

## 2020-05-20 ENCOUNTER — ANESTHESIA (INPATIENT)
Dept: PREOP/PACU | Facility: HOSPITAL | Age: 70
DRG: 885 | End: 2020-05-20
Payer: COMMERCIAL

## 2020-05-20 LAB
BACTERIA WND AEROBE CULT: ABNORMAL
BACTERIA WND AEROBE CULT: ABNORMAL
GRAM STN SPEC: ABNORMAL

## 2020-05-20 PROCEDURE — 90870 ELECTROCONVULSIVE THERAPY: CPT | Performed by: PSYCHIATRY & NEUROLOGY

## 2020-05-20 PROCEDURE — 87505 NFCT AGENT DETECTION GI: CPT | Performed by: PHYSICIAN ASSISTANT

## 2020-05-20 PROCEDURE — GZB2ZZZ ELECTROCONVULSIVE THERAPY, BILATERAL-SINGLE SEIZURE: ICD-10-PCS | Performed by: PSYCHIATRY & NEUROLOGY

## 2020-05-20 PROCEDURE — 90870 ELECTROCONVULSIVE THERAPY: CPT

## 2020-05-20 PROCEDURE — 99231 SBSQ HOSP IP/OBS SF/LOW 25: CPT | Performed by: PSYCHIATRY & NEUROLOGY

## 2020-05-20 RX ORDER — SUCCINYLCHOLINE/SOD CL,ISO/PF 100 MG/5ML
SYRINGE (ML) INTRAVENOUS AS NEEDED
Status: DISCONTINUED | OUTPATIENT
Start: 2020-05-20 | End: 2020-05-20 | Stop reason: SURG

## 2020-05-20 RX ORDER — ESMOLOL HYDROCHLORIDE 10 MG/ML
INJECTION INTRAVENOUS AS NEEDED
Status: DISCONTINUED | OUTPATIENT
Start: 2020-05-20 | End: 2020-05-20 | Stop reason: SURG

## 2020-05-20 RX ORDER — SODIUM CHLORIDE 9 MG/ML
125 INJECTION, SOLUTION INTRAVENOUS CONTINUOUS
Status: DISCONTINUED | OUTPATIENT
Start: 2020-05-20 | End: 2020-05-20

## 2020-05-20 RX ORDER — GLYCOPYRROLATE 0.2 MG/ML
INJECTION INTRAMUSCULAR; INTRAVENOUS AS NEEDED
Status: DISCONTINUED | OUTPATIENT
Start: 2020-05-20 | End: 2020-05-20 | Stop reason: SURG

## 2020-05-20 RX ORDER — ETOMIDATE 2 MG/ML
INJECTION INTRAVENOUS AS NEEDED
Status: DISCONTINUED | OUTPATIENT
Start: 2020-05-20 | End: 2020-05-20 | Stop reason: SURG

## 2020-05-20 RX ADMIN — GABAPENTIN 200 MG: 100 CAPSULE ORAL at 08:52

## 2020-05-20 RX ADMIN — LEVOTHYROXINE SODIUM 88 MCG: 88 TABLET ORAL at 08:55

## 2020-05-20 RX ADMIN — ETOMIDATE 20 MG: 2 INJECTION, SOLUTION INTRAVENOUS at 07:10

## 2020-05-20 RX ADMIN — GLYCOPYRROLATE 0.2 MG: 0.2 INJECTION, SOLUTION INTRAMUSCULAR; INTRAVENOUS at 07:09

## 2020-05-20 RX ADMIN — GABAPENTIN 200 MG: 100 CAPSULE ORAL at 17:09

## 2020-05-20 RX ADMIN — HYDROCORTISONE ACETATE 25 MG: 25 SUPPOSITORY RECTAL at 08:52

## 2020-05-20 RX ADMIN — Medication 250 MG: at 20:43

## 2020-05-20 RX ADMIN — AMOXICILLIN AND CLAVULANATE POTASSIUM 1 TABLET: 875; 125 TABLET, FILM COATED ORAL at 20:47

## 2020-05-20 RX ADMIN — CYANOCOBALAMIN TAB 1000 MCG 500 MCG: 1000 TAB at 08:51

## 2020-05-20 RX ADMIN — OLANZAPINE 10 MG: 10 TABLET, FILM COATED ORAL at 20:43

## 2020-05-20 RX ADMIN — BACITRACIN ZINC 1 SMALL APPLICATION: 500 OINTMENT TOPICAL at 08:51

## 2020-05-20 RX ADMIN — Medication 250 MG: at 08:56

## 2020-05-20 RX ADMIN — Medication 100 MG: at 07:11

## 2020-05-20 RX ADMIN — FLUOXETINE 20 MG: 20 CAPSULE ORAL at 08:51

## 2020-05-20 RX ADMIN — MESALAMINE 1200 MG: 400 CAPSULE, DELAYED RELEASE ORAL at 08:53

## 2020-05-20 RX ADMIN — ESMOLOL HYDROCHLORIDE 50 MG: 10 INJECTION, SOLUTION INTRAVENOUS at 07:11

## 2020-05-20 RX ADMIN — BACITRACIN ZINC 1 SMALL APPLICATION: 500 OINTMENT TOPICAL at 20:41

## 2020-05-20 RX ADMIN — SODIUM CHLORIDE 50 ML/HR: 0.9 INJECTION, SOLUTION INTRAVENOUS at 06:18

## 2020-05-20 RX ADMIN — CARVEDILOL 3.12 MG: 3.12 TABLET, FILM COATED ORAL at 06:04

## 2020-05-20 RX ADMIN — AMOXICILLIN AND CLAVULANATE POTASSIUM 1 TABLET: 875; 125 TABLET, FILM COATED ORAL at 08:50

## 2020-05-20 RX ADMIN — PANTOPRAZOLE SODIUM 40 MG: 40 TABLET, DELAYED RELEASE ORAL at 08:55

## 2020-05-20 RX ADMIN — ATORVASTATIN CALCIUM 20 MG: 20 TABLET, FILM COATED ORAL at 17:08

## 2020-05-20 RX ADMIN — MESALAMINE 1200 MG: 400 CAPSULE, DELAYED RELEASE ORAL at 17:09

## 2020-05-20 RX ADMIN — GABAPENTIN 200 MG: 100 CAPSULE ORAL at 20:42

## 2020-05-20 RX ADMIN — CARVEDILOL 3.12 MG: 3.12 TABLET, FILM COATED ORAL at 17:08

## 2020-05-20 RX ADMIN — HYDROCORTISONE ACETATE 25 MG: 25 SUPPOSITORY RECTAL at 17:09

## 2020-05-20 RX ADMIN — MESALAMINE 1200 MG: 400 CAPSULE, DELAYED RELEASE ORAL at 20:43

## 2020-05-21 ENCOUNTER — ANESTHESIA EVENT (INPATIENT)
Dept: PREOP/PACU | Facility: HOSPITAL | Age: 70
DRG: 885 | End: 2020-05-21
Payer: COMMERCIAL

## 2020-05-21 LAB
BASOPHILS # BLD AUTO: 0.1 THOUSANDS/ΜL (ref 0–0.1)
BASOPHILS NFR BLD AUTO: 1 % (ref 0–1)
CAMPYLOBACTER DNA SPEC NAA+PROBE: NORMAL
EOSINOPHIL # BLD AUTO: 0.1 THOUSAND/ΜL (ref 0–0.4)
EOSINOPHIL NFR BLD AUTO: 2 % (ref 0–6)
ERYTHROCYTE [DISTWIDTH] IN BLOOD BY AUTOMATED COUNT: 15.1 %
HCT VFR BLD AUTO: 37.5 % (ref 41–53)
HGB BLD-MCNC: 12.2 G/DL (ref 13.5–17.5)
LYMPHOCYTES # BLD AUTO: 1 THOUSANDS/ΜL (ref 0.5–4)
LYMPHOCYTES NFR BLD AUTO: 15 % (ref 25–45)
MCH RBC QN AUTO: 30.3 PG (ref 26–34)
MCHC RBC AUTO-ENTMCNC: 32.5 G/DL (ref 31–36)
MCV RBC AUTO: 93 FL (ref 80–100)
MONOCYTES # BLD AUTO: 0.8 THOUSAND/ΜL (ref 0.2–0.9)
MONOCYTES NFR BLD AUTO: 12 % (ref 1–10)
NEUTROPHILS # BLD AUTO: 4.4 THOUSANDS/ΜL (ref 1.8–7.8)
NEUTS SEG NFR BLD AUTO: 70 % (ref 45–65)
PLATELET # BLD AUTO: 304 THOUSANDS/UL (ref 150–450)
PMV BLD AUTO: 7.6 FL (ref 8.9–12.7)
RBC # BLD AUTO: 4.03 MILLION/UL (ref 4.5–5.9)
SALMONELLA DNA SPEC QL NAA+PROBE: NORMAL
SHIGA TOXIN STX GENE SPEC NAA+PROBE: NORMAL
SHIGELLA DNA SPEC QL NAA+PROBE: NORMAL
WBC # BLD AUTO: 6.3 THOUSAND/UL (ref 4.5–11)

## 2020-05-21 PROCEDURE — 85025 COMPLETE CBC W/AUTO DIFF WBC: CPT | Performed by: PHYSICIAN ASSISTANT

## 2020-05-21 PROCEDURE — 99232 SBSQ HOSP IP/OBS MODERATE 35: CPT | Performed by: PSYCHIATRY & NEUROLOGY

## 2020-05-21 RX ADMIN — BACITRACIN ZINC 1 SMALL APPLICATION: 500 OINTMENT TOPICAL at 21:51

## 2020-05-21 RX ADMIN — Medication 250 MG: at 21:45

## 2020-05-21 RX ADMIN — FLUOXETINE 20 MG: 20 CAPSULE ORAL at 08:59

## 2020-05-21 RX ADMIN — GABAPENTIN 200 MG: 100 CAPSULE ORAL at 21:45

## 2020-05-21 RX ADMIN — MESALAMINE 1200 MG: 400 CAPSULE, DELAYED RELEASE ORAL at 21:43

## 2020-05-21 RX ADMIN — OLANZAPINE 10 MG: 10 TABLET, FILM COATED ORAL at 21:44

## 2020-05-21 RX ADMIN — MESALAMINE 1200 MG: 400 CAPSULE, DELAYED RELEASE ORAL at 17:06

## 2020-05-21 RX ADMIN — BACITRACIN ZINC 1 SMALL APPLICATION: 500 OINTMENT TOPICAL at 09:03

## 2020-05-21 RX ADMIN — AMOXICILLIN AND CLAVULANATE POTASSIUM 1 TABLET: 875; 125 TABLET, FILM COATED ORAL at 22:44

## 2020-05-21 RX ADMIN — PANTOPRAZOLE SODIUM 40 MG: 40 TABLET, DELAYED RELEASE ORAL at 05:50

## 2020-05-21 RX ADMIN — ATORVASTATIN CALCIUM 20 MG: 20 TABLET, FILM COATED ORAL at 17:05

## 2020-05-21 RX ADMIN — LEVOTHYROXINE SODIUM 88 MCG: 88 TABLET ORAL at 05:49

## 2020-05-21 RX ADMIN — CYANOCOBALAMIN TAB 1000 MCG 500 MCG: 1000 TAB at 08:59

## 2020-05-21 RX ADMIN — Medication 250 MG: at 09:05

## 2020-05-21 RX ADMIN — HYDROCORTISONE ACETATE 25 MG: 25 SUPPOSITORY RECTAL at 08:58

## 2020-05-21 RX ADMIN — HYDROCORTISONE ACETATE 25 MG: 25 SUPPOSITORY RECTAL at 17:05

## 2020-05-21 RX ADMIN — CARVEDILOL 3.12 MG: 3.12 TABLET, FILM COATED ORAL at 17:05

## 2020-05-21 RX ADMIN — AMOXICILLIN AND CLAVULANATE POTASSIUM 1 TABLET: 875; 125 TABLET, FILM COATED ORAL at 08:58

## 2020-05-21 RX ADMIN — GABAPENTIN 200 MG: 100 CAPSULE ORAL at 07:40

## 2020-05-21 RX ADMIN — MESALAMINE 1200 MG: 400 CAPSULE, DELAYED RELEASE ORAL at 07:40

## 2020-05-21 RX ADMIN — GABAPENTIN 200 MG: 100 CAPSULE ORAL at 17:05

## 2020-05-22 ENCOUNTER — ANESTHESIA (INPATIENT)
Dept: PREOP/PACU | Facility: HOSPITAL | Age: 70
DRG: 885 | End: 2020-05-22
Payer: COMMERCIAL

## 2020-05-22 ENCOUNTER — APPOINTMENT (INPATIENT)
Dept: PREOP/PACU | Facility: HOSPITAL | Age: 70
DRG: 885 | End: 2020-05-22
Payer: COMMERCIAL

## 2020-05-22 PROCEDURE — 90870 ELECTROCONVULSIVE THERAPY: CPT

## 2020-05-22 PROCEDURE — NC001 PR NO CHARGE: Performed by: PSYCHIATRY & NEUROLOGY

## 2020-05-22 PROCEDURE — GZB2ZZZ ELECTROCONVULSIVE THERAPY, BILATERAL-SINGLE SEIZURE: ICD-10-PCS | Performed by: PSYCHIATRY & NEUROLOGY

## 2020-05-22 PROCEDURE — 90870 ELECTROCONVULSIVE THERAPY: CPT | Performed by: PSYCHIATRY & NEUROLOGY

## 2020-05-22 RX ORDER — ESMOLOL HYDROCHLORIDE 10 MG/ML
INJECTION INTRAVENOUS AS NEEDED
Status: DISCONTINUED | OUTPATIENT
Start: 2020-05-22 | End: 2020-05-22 | Stop reason: SURG

## 2020-05-22 RX ORDER — GLYCOPYRROLATE 0.2 MG/ML
INJECTION INTRAMUSCULAR; INTRAVENOUS AS NEEDED
Status: DISCONTINUED | OUTPATIENT
Start: 2020-05-22 | End: 2020-05-22 | Stop reason: SURG

## 2020-05-22 RX ORDER — SODIUM CHLORIDE 9 MG/ML
INJECTION, SOLUTION INTRAVENOUS CONTINUOUS PRN
Status: DISCONTINUED | OUTPATIENT
Start: 2020-05-22 | End: 2020-05-22 | Stop reason: SURG

## 2020-05-22 RX ORDER — SODIUM CHLORIDE 9 MG/ML
125 INJECTION, SOLUTION INTRAVENOUS CONTINUOUS
Status: DISCONTINUED | OUTPATIENT
Start: 2020-05-22 | End: 2020-05-23

## 2020-05-22 RX ORDER — SUCCINYLCHOLINE/SOD CL,ISO/PF 100 MG/5ML
SYRINGE (ML) INTRAVENOUS AS NEEDED
Status: DISCONTINUED | OUTPATIENT
Start: 2020-05-22 | End: 2020-05-22 | Stop reason: SURG

## 2020-05-22 RX ORDER — ETOMIDATE 2 MG/ML
INJECTION INTRAVENOUS AS NEEDED
Status: DISCONTINUED | OUTPATIENT
Start: 2020-05-22 | End: 2020-05-22 | Stop reason: SURG

## 2020-05-22 RX ADMIN — GABAPENTIN 200 MG: 100 CAPSULE ORAL at 08:05

## 2020-05-22 RX ADMIN — Medication 100 MG: at 07:07

## 2020-05-22 RX ADMIN — AMOXICILLIN AND CLAVULANATE POTASSIUM 1 TABLET: 875; 125 TABLET, FILM COATED ORAL at 09:11

## 2020-05-22 RX ADMIN — ESMOLOL HYDROCHLORIDE 50 MG: 10 INJECTION, SOLUTION INTRAVENOUS at 07:07

## 2020-05-22 RX ADMIN — ETOMIDATE 20 MG: 2 INJECTION, SOLUTION INTRAVENOUS at 07:07

## 2020-05-22 RX ADMIN — PANTOPRAZOLE SODIUM 40 MG: 40 TABLET, DELAYED RELEASE ORAL at 09:06

## 2020-05-22 RX ADMIN — Medication 250 MG: at 09:04

## 2020-05-22 RX ADMIN — BACITRACIN ZINC 1 SMALL APPLICATION: 500 OINTMENT TOPICAL at 09:07

## 2020-05-22 RX ADMIN — Medication 250 MG: at 21:39

## 2020-05-22 RX ADMIN — MESALAMINE 1200 MG: 400 CAPSULE, DELAYED RELEASE ORAL at 16:51

## 2020-05-22 RX ADMIN — LEVOTHYROXINE SODIUM 88 MCG: 88 TABLET ORAL at 09:06

## 2020-05-22 RX ADMIN — ATORVASTATIN CALCIUM 20 MG: 20 TABLET, FILM COATED ORAL at 16:52

## 2020-05-22 RX ADMIN — CYANOCOBALAMIN TAB 1000 MCG 500 MCG: 1000 TAB at 09:06

## 2020-05-22 RX ADMIN — AMOXICILLIN AND CLAVULANATE POTASSIUM 1 TABLET: 875; 125 TABLET, FILM COATED ORAL at 21:41

## 2020-05-22 RX ADMIN — GLYCOPYRROLATE 0.2 MG: 0.2 INJECTION, SOLUTION INTRAMUSCULAR; INTRAVENOUS at 06:59

## 2020-05-22 RX ADMIN — HYDROXYZINE HYDROCHLORIDE 25 MG: 25 TABLET, FILM COATED ORAL at 17:18

## 2020-05-22 RX ADMIN — FLUOXETINE 30 MG: 20 CAPSULE ORAL at 09:05

## 2020-05-22 RX ADMIN — SODIUM CHLORIDE 125 ML/HR: 0.9 INJECTION, SOLUTION INTRAVENOUS at 06:24

## 2020-05-22 RX ADMIN — CARVEDILOL 3.12 MG: 3.12 TABLET, FILM COATED ORAL at 05:35

## 2020-05-22 RX ADMIN — OLANZAPINE 10 MG: 10 TABLET, FILM COATED ORAL at 21:39

## 2020-05-22 RX ADMIN — GABAPENTIN 200 MG: 100 CAPSULE ORAL at 21:39

## 2020-05-22 RX ADMIN — HYDROCORTISONE ACETATE 25 MG: 25 SUPPOSITORY RECTAL at 09:07

## 2020-05-22 RX ADMIN — MESALAMINE 1200 MG: 400 CAPSULE, DELAYED RELEASE ORAL at 08:05

## 2020-05-22 RX ADMIN — MESALAMINE 1200 MG: 400 CAPSULE, DELAYED RELEASE ORAL at 21:39

## 2020-05-22 RX ADMIN — BACITRACIN ZINC 1 SMALL APPLICATION: 500 OINTMENT TOPICAL at 21:40

## 2020-05-22 RX ADMIN — GABAPENTIN 200 MG: 100 CAPSULE ORAL at 16:52

## 2020-05-22 RX ADMIN — SODIUM CHLORIDE: 0.9 INJECTION, SOLUTION INTRAVENOUS at 07:03

## 2020-05-23 RX ADMIN — PANTOPRAZOLE SODIUM 40 MG: 40 TABLET, DELAYED RELEASE ORAL at 05:37

## 2020-05-23 RX ADMIN — Medication 250 MG: at 21:29

## 2020-05-23 RX ADMIN — MESALAMINE 1200 MG: 400 CAPSULE, DELAYED RELEASE ORAL at 21:29

## 2020-05-23 RX ADMIN — ATORVASTATIN CALCIUM 20 MG: 20 TABLET, FILM COATED ORAL at 15:44

## 2020-05-23 RX ADMIN — BACITRACIN ZINC 1 SMALL APPLICATION: 500 OINTMENT TOPICAL at 21:29

## 2020-05-23 RX ADMIN — CARVEDILOL 3.12 MG: 3.12 TABLET, FILM COATED ORAL at 07:58

## 2020-05-23 RX ADMIN — GABAPENTIN 200 MG: 100 CAPSULE ORAL at 21:29

## 2020-05-23 RX ADMIN — AMOXICILLIN AND CLAVULANATE POTASSIUM 1 TABLET: 875; 125 TABLET, FILM COATED ORAL at 08:10

## 2020-05-23 RX ADMIN — CARVEDILOL 3.12 MG: 3.12 TABLET, FILM COATED ORAL at 15:44

## 2020-05-23 RX ADMIN — AMOXICILLIN AND CLAVULANATE POTASSIUM 1 TABLET: 875; 125 TABLET, FILM COATED ORAL at 21:29

## 2020-05-23 RX ADMIN — GABAPENTIN 200 MG: 100 CAPSULE ORAL at 07:58

## 2020-05-23 RX ADMIN — BACITRACIN ZINC 1 SMALL APPLICATION: 500 OINTMENT TOPICAL at 09:20

## 2020-05-23 RX ADMIN — LEVOTHYROXINE SODIUM 88 MCG: 88 TABLET ORAL at 05:37

## 2020-05-23 RX ADMIN — GABAPENTIN 200 MG: 100 CAPSULE ORAL at 15:53

## 2020-05-23 RX ADMIN — CYANOCOBALAMIN TAB 1000 MCG 500 MCG: 1000 TAB at 08:10

## 2020-05-23 RX ADMIN — HYDROCORTISONE ACETATE 25 MG: 25 SUPPOSITORY RECTAL at 08:25

## 2020-05-23 RX ADMIN — FLUOXETINE 30 MG: 20 CAPSULE ORAL at 08:10

## 2020-05-23 RX ADMIN — MESALAMINE 1200 MG: 400 CAPSULE, DELAYED RELEASE ORAL at 15:44

## 2020-05-23 RX ADMIN — OLANZAPINE 10 MG: 10 TABLET, FILM COATED ORAL at 21:29

## 2020-05-23 RX ADMIN — HYDROCORTISONE ACETATE 25 MG: 25 SUPPOSITORY RECTAL at 17:11

## 2020-05-23 RX ADMIN — MESALAMINE 1200 MG: 400 CAPSULE, DELAYED RELEASE ORAL at 08:05

## 2020-05-23 RX ADMIN — Medication 250 MG: at 08:10

## 2020-05-24 RX ADMIN — LEVOTHYROXINE SODIUM 88 MCG: 88 TABLET ORAL at 06:12

## 2020-05-24 RX ADMIN — GABAPENTIN 200 MG: 100 CAPSULE ORAL at 15:40

## 2020-05-24 RX ADMIN — AMOXICILLIN AND CLAVULANATE POTASSIUM 1 TABLET: 875; 125 TABLET, FILM COATED ORAL at 21:46

## 2020-05-24 RX ADMIN — HYDROCORTISONE ACETATE 25 MG: 25 SUPPOSITORY RECTAL at 17:10

## 2020-05-24 RX ADMIN — PANTOPRAZOLE SODIUM 40 MG: 40 TABLET, DELAYED RELEASE ORAL at 06:12

## 2020-05-24 RX ADMIN — Medication 250 MG: at 08:23

## 2020-05-24 RX ADMIN — BACITRACIN ZINC 1 SMALL APPLICATION: 500 OINTMENT TOPICAL at 21:46

## 2020-05-24 RX ADMIN — POLYETHYLENE GLYCOL 3350 17 G: 17 POWDER, FOR SOLUTION ORAL at 07:52

## 2020-05-24 RX ADMIN — FLUOXETINE 30 MG: 20 CAPSULE ORAL at 08:23

## 2020-05-24 RX ADMIN — GABAPENTIN 200 MG: 100 CAPSULE ORAL at 21:46

## 2020-05-24 RX ADMIN — Medication 250 MG: at 21:46

## 2020-05-24 RX ADMIN — HYDROCORTISONE ACETATE 25 MG: 25 SUPPOSITORY RECTAL at 08:53

## 2020-05-24 RX ADMIN — MESALAMINE 1200 MG: 400 CAPSULE, DELAYED RELEASE ORAL at 07:51

## 2020-05-24 RX ADMIN — OLANZAPINE 10 MG: 10 TABLET, FILM COATED ORAL at 21:47

## 2020-05-24 RX ADMIN — AMOXICILLIN AND CLAVULANATE POTASSIUM 1 TABLET: 875; 125 TABLET, FILM COATED ORAL at 08:23

## 2020-05-24 RX ADMIN — ATORVASTATIN CALCIUM 20 MG: 20 TABLET, FILM COATED ORAL at 15:40

## 2020-05-24 RX ADMIN — BACITRACIN ZINC 1 SMALL APPLICATION: 500 OINTMENT TOPICAL at 08:23

## 2020-05-24 RX ADMIN — MESALAMINE 1200 MG: 400 CAPSULE, DELAYED RELEASE ORAL at 15:40

## 2020-05-24 RX ADMIN — GABAPENTIN 200 MG: 100 CAPSULE ORAL at 07:52

## 2020-05-24 RX ADMIN — MESALAMINE 1200 MG: 400 CAPSULE, DELAYED RELEASE ORAL at 21:46

## 2020-05-24 RX ADMIN — CYANOCOBALAMIN TAB 1000 MCG 500 MCG: 1000 TAB at 08:23

## 2020-05-25 LAB
ANION GAP SERPL CALCULATED.3IONS-SCNC: 5 MMOL/L (ref 5–14)
BUN SERPL-MCNC: 11 MG/DL (ref 5–25)
CALCIUM SERPL-MCNC: 8.9 MG/DL (ref 8.4–10.2)
CHLORIDE SERPL-SCNC: 103 MMOL/L (ref 97–108)
CO2 SERPL-SCNC: 30 MMOL/L (ref 22–30)
CREAT SERPL-MCNC: 0.86 MG/DL (ref 0.7–1.5)
GFR SERPL CREATININE-BSD FRML MDRD: 90 ML/MIN/1.73SQ M
GLUCOSE P FAST SERPL-MCNC: 98 MG/DL (ref 70–99)
GLUCOSE SERPL-MCNC: 98 MG/DL (ref 70–99)
POTASSIUM SERPL-SCNC: 4 MMOL/L (ref 3.6–5)
SODIUM SERPL-SCNC: 138 MMOL/L (ref 137–147)

## 2020-05-25 PROCEDURE — 80048 BASIC METABOLIC PNL TOTAL CA: CPT | Performed by: FAMILY MEDICINE

## 2020-05-25 PROCEDURE — 99232 SBSQ HOSP IP/OBS MODERATE 35: CPT | Performed by: PHYSICIAN ASSISTANT

## 2020-05-25 RX ADMIN — ATORVASTATIN CALCIUM 20 MG: 20 TABLET, FILM COATED ORAL at 15:34

## 2020-05-25 RX ADMIN — CYANOCOBALAMIN TAB 1000 MCG 500 MCG: 1000 TAB at 08:03

## 2020-05-25 RX ADMIN — AMOXICILLIN AND CLAVULANATE POTASSIUM 1 TABLET: 875; 125 TABLET, FILM COATED ORAL at 21:11

## 2020-05-25 RX ADMIN — BACITRACIN ZINC 1 SMALL APPLICATION: 500 OINTMENT TOPICAL at 21:12

## 2020-05-25 RX ADMIN — BACITRACIN ZINC 1 SMALL APPLICATION: 500 OINTMENT TOPICAL at 10:18

## 2020-05-25 RX ADMIN — MESALAMINE 1200 MG: 400 CAPSULE, DELAYED RELEASE ORAL at 15:34

## 2020-05-25 RX ADMIN — MESALAMINE 1200 MG: 400 CAPSULE, DELAYED RELEASE ORAL at 21:05

## 2020-05-25 RX ADMIN — HYDROCORTISONE ACETATE 25 MG: 25 SUPPOSITORY RECTAL at 08:32

## 2020-05-25 RX ADMIN — OLANZAPINE 10 MG: 10 TABLET, FILM COATED ORAL at 21:05

## 2020-05-25 RX ADMIN — MESALAMINE 1200 MG: 400 CAPSULE, DELAYED RELEASE ORAL at 07:32

## 2020-05-25 RX ADMIN — GABAPENTIN 200 MG: 100 CAPSULE ORAL at 15:34

## 2020-05-25 RX ADMIN — PANTOPRAZOLE SODIUM 40 MG: 40 TABLET, DELAYED RELEASE ORAL at 06:06

## 2020-05-25 RX ADMIN — GABAPENTIN 200 MG: 100 CAPSULE ORAL at 07:32

## 2020-05-25 RX ADMIN — HYDROCORTISONE ACETATE 25 MG: 25 SUPPOSITORY RECTAL at 17:05

## 2020-05-25 RX ADMIN — AMOXICILLIN AND CLAVULANATE POTASSIUM 1 TABLET: 875; 125 TABLET, FILM COATED ORAL at 08:02

## 2020-05-25 RX ADMIN — FLUOXETINE 30 MG: 20 CAPSULE ORAL at 08:03

## 2020-05-25 RX ADMIN — LEVOTHYROXINE SODIUM 88 MCG: 88 TABLET ORAL at 06:06

## 2020-05-25 RX ADMIN — GABAPENTIN 200 MG: 100 CAPSULE ORAL at 21:05

## 2020-05-25 RX ADMIN — HYDROXYZINE HYDROCHLORIDE 25 MG: 25 TABLET, FILM COATED ORAL at 17:56

## 2020-05-25 RX ADMIN — Medication 250 MG: at 08:03

## 2020-05-25 RX ADMIN — Medication 250 MG: at 21:05

## 2020-05-26 ENCOUNTER — ANESTHESIA EVENT (INPATIENT)
Dept: PREOP/PACU | Facility: HOSPITAL | Age: 70
DRG: 885 | End: 2020-05-26
Payer: COMMERCIAL

## 2020-05-26 PROCEDURE — 99232 SBSQ HOSP IP/OBS MODERATE 35: CPT | Performed by: PSYCHIATRY & NEUROLOGY

## 2020-05-26 RX ADMIN — BACITRACIN ZINC 1 SMALL APPLICATION: 500 OINTMENT TOPICAL at 08:38

## 2020-05-26 RX ADMIN — GABAPENTIN 200 MG: 100 CAPSULE ORAL at 23:19

## 2020-05-26 RX ADMIN — MESALAMINE 1200 MG: 400 CAPSULE, DELAYED RELEASE ORAL at 17:34

## 2020-05-26 RX ADMIN — Medication 250 MG: at 23:18

## 2020-05-26 RX ADMIN — GABAPENTIN 200 MG: 100 CAPSULE ORAL at 17:34

## 2020-05-26 RX ADMIN — MESALAMINE 1200 MG: 400 CAPSULE, DELAYED RELEASE ORAL at 23:16

## 2020-05-26 RX ADMIN — HYDROCORTISONE ACETATE 25 MG: 25 SUPPOSITORY RECTAL at 17:34

## 2020-05-26 RX ADMIN — CYANOCOBALAMIN TAB 1000 MCG 500 MCG: 1000 TAB at 08:38

## 2020-05-26 RX ADMIN — BACITRACIN ZINC 1 SMALL APPLICATION: 500 OINTMENT TOPICAL at 22:00

## 2020-05-26 RX ADMIN — MESALAMINE 1200 MG: 400 CAPSULE, DELAYED RELEASE ORAL at 08:40

## 2020-05-26 RX ADMIN — MIRTAZAPINE 7.5 MG: 7.5 TABLET, FILM COATED ORAL at 23:20

## 2020-05-26 RX ADMIN — PANTOPRAZOLE SODIUM 40 MG: 40 TABLET, DELAYED RELEASE ORAL at 05:59

## 2020-05-26 RX ADMIN — AMPICILLIN SODIUM AND SULBACTAM SODIUM 3 G: 2; 1 INJECTION, POWDER, FOR SOLUTION INTRAMUSCULAR; INTRAVENOUS at 13:08

## 2020-05-26 RX ADMIN — OLANZAPINE 10 MG: 10 TABLET, FILM COATED ORAL at 23:19

## 2020-05-26 RX ADMIN — HYDROCORTISONE ACETATE 25 MG: 25 SUPPOSITORY RECTAL at 08:40

## 2020-05-26 RX ADMIN — HYDROXYZINE HYDROCHLORIDE 25 MG: 25 TABLET, FILM COATED ORAL at 18:08

## 2020-05-26 RX ADMIN — ATORVASTATIN CALCIUM 20 MG: 20 TABLET, FILM COATED ORAL at 17:31

## 2020-05-26 RX ADMIN — FLUOXETINE 30 MG: 20 CAPSULE ORAL at 08:39

## 2020-05-26 RX ADMIN — Medication 250 MG: at 08:41

## 2020-05-26 RX ADMIN — LEVOTHYROXINE SODIUM 88 MCG: 88 TABLET ORAL at 05:59

## 2020-05-26 RX ADMIN — GABAPENTIN 200 MG: 100 CAPSULE ORAL at 08:39

## 2020-05-27 ENCOUNTER — ANESTHESIA (INPATIENT)
Dept: PREOP/PACU | Facility: HOSPITAL | Age: 70
DRG: 885 | End: 2020-05-27
Payer: COMMERCIAL

## 2020-05-27 ENCOUNTER — APPOINTMENT (INPATIENT)
Dept: PREOP/PACU | Facility: HOSPITAL | Age: 70
DRG: 885 | End: 2020-05-27
Payer: COMMERCIAL

## 2020-05-27 PROCEDURE — GZB2ZZZ ELECTROCONVULSIVE THERAPY, BILATERAL-SINGLE SEIZURE: ICD-10-PCS | Performed by: PSYCHIATRY & NEUROLOGY

## 2020-05-27 PROCEDURE — 99232 SBSQ HOSP IP/OBS MODERATE 35: CPT | Performed by: PSYCHIATRY & NEUROLOGY

## 2020-05-27 PROCEDURE — 90870 ELECTROCONVULSIVE THERAPY: CPT

## 2020-05-27 RX ORDER — SUCCINYLCHOLINE/SOD CL,ISO/PF 100 MG/5ML
SYRINGE (ML) INTRAVENOUS AS NEEDED
Status: DISCONTINUED | OUTPATIENT
Start: 2020-05-27 | End: 2020-05-27 | Stop reason: SURG

## 2020-05-27 RX ORDER — ESMOLOL HYDROCHLORIDE 10 MG/ML
INJECTION INTRAVENOUS AS NEEDED
Status: DISCONTINUED | OUTPATIENT
Start: 2020-05-27 | End: 2020-05-27 | Stop reason: SURG

## 2020-05-27 RX ORDER — ETOMIDATE 2 MG/ML
INJECTION INTRAVENOUS AS NEEDED
Status: DISCONTINUED | OUTPATIENT
Start: 2020-05-27 | End: 2020-05-27 | Stop reason: SURG

## 2020-05-27 RX ORDER — SODIUM CHLORIDE 9 MG/ML
125 INJECTION, SOLUTION INTRAVENOUS CONTINUOUS
Status: DISCONTINUED | OUTPATIENT
Start: 2020-05-27 | End: 2020-05-27

## 2020-05-27 RX ADMIN — MESALAMINE 1200 MG: 400 CAPSULE, DELAYED RELEASE ORAL at 21:16

## 2020-05-27 RX ADMIN — Medication 100 MG: at 07:30

## 2020-05-27 RX ADMIN — CARVEDILOL 3.12 MG: 3.12 TABLET, FILM COATED ORAL at 16:42

## 2020-05-27 RX ADMIN — LEVOTHYROXINE SODIUM 88 MCG: 88 TABLET ORAL at 08:32

## 2020-05-27 RX ADMIN — ESMOLOL HYDROCHLORIDE 50 MG: 10 INJECTION, SOLUTION INTRAVENOUS at 07:32

## 2020-05-27 RX ADMIN — GABAPENTIN 200 MG: 100 CAPSULE ORAL at 16:40

## 2020-05-27 RX ADMIN — ATORVASTATIN CALCIUM 20 MG: 20 TABLET, FILM COATED ORAL at 16:41

## 2020-05-27 RX ADMIN — PANTOPRAZOLE SODIUM 40 MG: 40 TABLET, DELAYED RELEASE ORAL at 08:34

## 2020-05-27 RX ADMIN — HYDROCORTISONE ACETATE 25 MG: 25 SUPPOSITORY RECTAL at 17:48

## 2020-05-27 RX ADMIN — GABAPENTIN 200 MG: 100 CAPSULE ORAL at 21:16

## 2020-05-27 RX ADMIN — CARVEDILOL 3.12 MG: 3.12 TABLET, FILM COATED ORAL at 05:04

## 2020-05-27 RX ADMIN — HYDROCORTISONE ACETATE 25 MG: 25 SUPPOSITORY RECTAL at 08:31

## 2020-05-27 RX ADMIN — Medication 250 MG: at 08:32

## 2020-05-27 RX ADMIN — OLANZAPINE 10 MG: 10 TABLET, FILM COATED ORAL at 21:16

## 2020-05-27 RX ADMIN — MESALAMINE 1200 MG: 400 CAPSULE, DELAYED RELEASE ORAL at 08:32

## 2020-05-27 RX ADMIN — CYANOCOBALAMIN TAB 1000 MCG 500 MCG: 1000 TAB at 08:32

## 2020-05-27 RX ADMIN — AMPICILLIN SODIUM AND SULBACTAM SODIUM 3 G: 2; 1 INJECTION, POWDER, FOR SOLUTION INTRAMUSCULAR; INTRAVENOUS at 12:30

## 2020-05-27 RX ADMIN — ETOMIDATE 20 MG: 2 INJECTION, SOLUTION INTRAVENOUS at 07:29

## 2020-05-27 RX ADMIN — BACITRACIN ZINC 1 SMALL APPLICATION: 500 OINTMENT TOPICAL at 08:31

## 2020-05-27 RX ADMIN — Medication 250 MG: at 21:16

## 2020-05-27 RX ADMIN — FLUOXETINE 30 MG: 20 CAPSULE ORAL at 08:32

## 2020-05-27 RX ADMIN — SODIUM CHLORIDE 125 ML/HR: 0.9 INJECTION, SOLUTION INTRAVENOUS at 06:16

## 2020-05-27 RX ADMIN — MESALAMINE 1200 MG: 400 CAPSULE, DELAYED RELEASE ORAL at 16:40

## 2020-05-27 RX ADMIN — GABAPENTIN 200 MG: 100 CAPSULE ORAL at 08:32

## 2020-05-27 RX ADMIN — BACITRACIN ZINC 1 SMALL APPLICATION: 500 OINTMENT TOPICAL at 21:16

## 2020-05-28 ENCOUNTER — ANESTHESIA EVENT (INPATIENT)
Dept: PREOP/PACU | Facility: HOSPITAL | Age: 70
DRG: 885 | End: 2020-05-28
Payer: COMMERCIAL

## 2020-05-28 PROCEDURE — 99232 SBSQ HOSP IP/OBS MODERATE 35: CPT | Performed by: PSYCHIATRY & NEUROLOGY

## 2020-05-28 RX ADMIN — MESALAMINE 1200 MG: 400 CAPSULE, DELAYED RELEASE ORAL at 09:16

## 2020-05-28 RX ADMIN — LEVOTHYROXINE SODIUM 88 MCG: 88 TABLET ORAL at 05:50

## 2020-05-28 RX ADMIN — AMPICILLIN SODIUM AND SULBACTAM SODIUM 3 G: 2; 1 INJECTION, POWDER, FOR SOLUTION INTRAMUSCULAR; INTRAVENOUS at 13:10

## 2020-05-28 RX ADMIN — GABAPENTIN 200 MG: 100 CAPSULE ORAL at 21:52

## 2020-05-28 RX ADMIN — GABAPENTIN 200 MG: 100 CAPSULE ORAL at 09:15

## 2020-05-28 RX ADMIN — PANTOPRAZOLE SODIUM 40 MG: 40 TABLET, DELAYED RELEASE ORAL at 05:50

## 2020-05-28 RX ADMIN — ATORVASTATIN CALCIUM 20 MG: 20 TABLET, FILM COATED ORAL at 17:50

## 2020-05-28 RX ADMIN — CARVEDILOL 3.12 MG: 3.12 TABLET, FILM COATED ORAL at 17:51

## 2020-05-28 RX ADMIN — HYDROCORTISONE ACETATE 25 MG: 25 SUPPOSITORY RECTAL at 09:16

## 2020-05-28 RX ADMIN — HYDROCORTISONE ACETATE 25 MG: 25 SUPPOSITORY RECTAL at 17:52

## 2020-05-28 RX ADMIN — MESALAMINE 1200 MG: 400 CAPSULE, DELAYED RELEASE ORAL at 17:52

## 2020-05-28 RX ADMIN — Medication 250 MG: at 21:52

## 2020-05-28 RX ADMIN — GABAPENTIN 200 MG: 100 CAPSULE ORAL at 17:55

## 2020-05-28 RX ADMIN — Medication 250 MG: at 09:17

## 2020-05-28 RX ADMIN — MESALAMINE 1200 MG: 400 CAPSULE, DELAYED RELEASE ORAL at 21:51

## 2020-05-28 RX ADMIN — CYANOCOBALAMIN TAB 1000 MCG 500 MCG: 1000 TAB at 09:14

## 2020-05-28 RX ADMIN — BACITRACIN ZINC 1 SMALL APPLICATION: 500 OINTMENT TOPICAL at 21:53

## 2020-05-28 RX ADMIN — OLANZAPINE 10 MG: 10 TABLET, FILM COATED ORAL at 21:52

## 2020-05-28 RX ADMIN — CARVEDILOL 3.12 MG: 3.12 TABLET, FILM COATED ORAL at 09:13

## 2020-05-28 RX ADMIN — BACITRACIN ZINC 1 SMALL APPLICATION: 500 OINTMENT TOPICAL at 09:13

## 2020-05-28 RX ADMIN — FLUOXETINE 30 MG: 20 CAPSULE ORAL at 09:15

## 2020-05-29 ENCOUNTER — APPOINTMENT (INPATIENT)
Dept: PREOP/PACU | Facility: HOSPITAL | Age: 70
DRG: 885 | End: 2020-05-29
Payer: COMMERCIAL

## 2020-05-29 ENCOUNTER — ANESTHESIA (INPATIENT)
Dept: PREOP/PACU | Facility: HOSPITAL | Age: 70
DRG: 885 | End: 2020-05-29
Payer: COMMERCIAL

## 2020-05-29 PROCEDURE — 90870 ELECTROCONVULSIVE THERAPY: CPT | Performed by: PSYCHIATRY & NEUROLOGY

## 2020-05-29 PROCEDURE — 99232 SBSQ HOSP IP/OBS MODERATE 35: CPT | Performed by: FAMILY MEDICINE

## 2020-05-29 PROCEDURE — 90870 ELECTROCONVULSIVE THERAPY: CPT

## 2020-05-29 PROCEDURE — 99232 SBSQ HOSP IP/OBS MODERATE 35: CPT | Performed by: PSYCHIATRY & NEUROLOGY

## 2020-05-29 PROCEDURE — GZB2ZZZ ELECTROCONVULSIVE THERAPY, BILATERAL-SINGLE SEIZURE: ICD-10-PCS | Performed by: PSYCHIATRY & NEUROLOGY

## 2020-05-29 RX ORDER — ESMOLOL HYDROCHLORIDE 10 MG/ML
INJECTION INTRAVENOUS AS NEEDED
Status: DISCONTINUED | OUTPATIENT
Start: 2020-05-29 | End: 2020-05-29 | Stop reason: SURG

## 2020-05-29 RX ORDER — GLYCOPYRROLATE 0.2 MG/ML
INJECTION INTRAMUSCULAR; INTRAVENOUS AS NEEDED
Status: DISCONTINUED | OUTPATIENT
Start: 2020-05-29 | End: 2020-05-29 | Stop reason: SURG

## 2020-05-29 RX ORDER — SODIUM CHLORIDE 9 MG/ML
50 INJECTION, SOLUTION INTRAVENOUS CONTINUOUS
Status: CANCELLED | OUTPATIENT
Start: 2020-05-29

## 2020-05-29 RX ORDER — SODIUM CHLORIDE 9 MG/ML
INJECTION, SOLUTION INTRAVENOUS CONTINUOUS PRN
Status: DISCONTINUED | OUTPATIENT
Start: 2020-05-29 | End: 2020-05-29 | Stop reason: SURG

## 2020-05-29 RX ORDER — SUCCINYLCHOLINE/SOD CL,ISO/PF 100 MG/5ML
SYRINGE (ML) INTRAVENOUS AS NEEDED
Status: DISCONTINUED | OUTPATIENT
Start: 2020-05-29 | End: 2020-05-29 | Stop reason: SURG

## 2020-05-29 RX ORDER — ETOMIDATE 2 MG/ML
INJECTION INTRAVENOUS AS NEEDED
Status: DISCONTINUED | OUTPATIENT
Start: 2020-05-29 | End: 2020-05-29 | Stop reason: SURG

## 2020-05-29 RX ORDER — SODIUM CHLORIDE 9 MG/ML
125 INJECTION, SOLUTION INTRAVENOUS CONTINUOUS
Status: DISCONTINUED | OUTPATIENT
Start: 2020-05-29 | End: 2020-06-05 | Stop reason: SURG

## 2020-05-29 RX ADMIN — ATORVASTATIN CALCIUM 20 MG: 20 TABLET, FILM COATED ORAL at 17:41

## 2020-05-29 RX ADMIN — MESALAMINE 1200 MG: 400 CAPSULE, DELAYED RELEASE ORAL at 21:33

## 2020-05-29 RX ADMIN — OLANZAPINE 10 MG: 10 TABLET, FILM COATED ORAL at 21:34

## 2020-05-29 RX ADMIN — BACITRACIN ZINC 1 SMALL APPLICATION: 500 OINTMENT TOPICAL at 21:33

## 2020-05-29 RX ADMIN — AMPICILLIN SODIUM AND SULBACTAM SODIUM 3 G: 2; 1 INJECTION, POWDER, FOR SOLUTION INTRAMUSCULAR; INTRAVENOUS at 12:30

## 2020-05-29 RX ADMIN — CYANOCOBALAMIN TAB 1000 MCG 500 MCG: 1000 TAB at 08:54

## 2020-05-29 RX ADMIN — FLUOXETINE 30 MG: 20 CAPSULE ORAL at 08:52

## 2020-05-29 RX ADMIN — MESALAMINE 1200 MG: 400 CAPSULE, DELAYED RELEASE ORAL at 17:43

## 2020-05-29 RX ADMIN — Medication 250 MG: at 08:52

## 2020-05-29 RX ADMIN — Medication 250 MG: at 21:35

## 2020-05-29 RX ADMIN — GABAPENTIN 200 MG: 100 CAPSULE ORAL at 08:52

## 2020-05-29 RX ADMIN — SODIUM CHLORIDE: 0.9 INJECTION, SOLUTION INTRAVENOUS at 06:51

## 2020-05-29 RX ADMIN — HYDROCORTISONE ACETATE 25 MG: 25 SUPPOSITORY RECTAL at 17:43

## 2020-05-29 RX ADMIN — LEVOTHYROXINE SODIUM 88 MCG: 88 TABLET ORAL at 08:56

## 2020-05-29 RX ADMIN — PANTOPRAZOLE SODIUM 40 MG: 40 TABLET, DELAYED RELEASE ORAL at 08:56

## 2020-05-29 RX ADMIN — Medication 100 MG: at 06:54

## 2020-05-29 RX ADMIN — MESALAMINE 1200 MG: 400 CAPSULE, DELAYED RELEASE ORAL at 08:51

## 2020-05-29 RX ADMIN — ESMOLOL HYDROCHLORIDE 50 MG: 10 INJECTION, SOLUTION INTRAVENOUS at 06:54

## 2020-05-29 RX ADMIN — BACITRACIN ZINC 1 SMALL APPLICATION: 500 OINTMENT TOPICAL at 08:52

## 2020-05-29 RX ADMIN — CARVEDILOL 3.12 MG: 3.12 TABLET, FILM COATED ORAL at 05:28

## 2020-05-29 RX ADMIN — GLYCOPYRROLATE 0.2 MG: 0.2 INJECTION, SOLUTION INTRAMUSCULAR; INTRAVENOUS at 06:54

## 2020-05-29 RX ADMIN — GABAPENTIN 200 MG: 100 CAPSULE ORAL at 17:43

## 2020-05-29 RX ADMIN — HYDROXYZINE HYDROCHLORIDE 25 MG: 25 TABLET, FILM COATED ORAL at 23:18

## 2020-05-29 RX ADMIN — HYDROCORTISONE ACETATE 25 MG: 25 SUPPOSITORY RECTAL at 08:53

## 2020-05-29 RX ADMIN — ETOMIDATE 20 MG: 2 INJECTION, SOLUTION INTRAVENOUS at 06:54

## 2020-05-29 RX ADMIN — GABAPENTIN 200 MG: 100 CAPSULE ORAL at 21:33

## 2020-05-29 RX ADMIN — SODIUM CHLORIDE 125 ML/HR: 0.9 INJECTION, SOLUTION INTRAVENOUS at 06:14

## 2020-05-29 RX ADMIN — CARVEDILOL 3.12 MG: 3.12 TABLET, FILM COATED ORAL at 17:42

## 2020-05-30 PROCEDURE — 99232 SBSQ HOSP IP/OBS MODERATE 35: CPT | Performed by: PSYCHIATRY & NEUROLOGY

## 2020-05-30 RX ADMIN — MESALAMINE 1200 MG: 400 CAPSULE, DELAYED RELEASE ORAL at 16:32

## 2020-05-30 RX ADMIN — HYDROXYZINE HYDROCHLORIDE 25 MG: 25 TABLET, FILM COATED ORAL at 11:33

## 2020-05-30 RX ADMIN — LEVOTHYROXINE SODIUM 88 MCG: 88 TABLET ORAL at 05:11

## 2020-05-30 RX ADMIN — FLUOXETINE 30 MG: 20 CAPSULE ORAL at 08:52

## 2020-05-30 RX ADMIN — GABAPENTIN 200 MG: 100 CAPSULE ORAL at 08:51

## 2020-05-30 RX ADMIN — Medication 250 MG: at 21:44

## 2020-05-30 RX ADMIN — HYDROCORTISONE ACETATE 25 MG: 25 SUPPOSITORY RECTAL at 17:30

## 2020-05-30 RX ADMIN — MESALAMINE 1200 MG: 400 CAPSULE, DELAYED RELEASE ORAL at 08:54

## 2020-05-30 RX ADMIN — BACITRACIN ZINC 1 SMALL APPLICATION: 500 OINTMENT TOPICAL at 08:51

## 2020-05-30 RX ADMIN — GABAPENTIN 200 MG: 100 CAPSULE ORAL at 16:32

## 2020-05-30 RX ADMIN — CARVEDILOL 3.12 MG: 3.12 TABLET, FILM COATED ORAL at 08:51

## 2020-05-30 RX ADMIN — GABAPENTIN 200 MG: 100 CAPSULE ORAL at 21:44

## 2020-05-30 RX ADMIN — CARVEDILOL 3.12 MG: 3.12 TABLET, FILM COATED ORAL at 17:30

## 2020-05-30 RX ADMIN — HYDROCORTISONE ACETATE 25 MG: 25 SUPPOSITORY RECTAL at 08:58

## 2020-05-30 RX ADMIN — CYANOCOBALAMIN TAB 1000 MCG 500 MCG: 1000 TAB at 08:52

## 2020-05-30 RX ADMIN — MESALAMINE 1200 MG: 400 CAPSULE, DELAYED RELEASE ORAL at 21:44

## 2020-05-30 RX ADMIN — BACITRACIN ZINC 1 SMALL APPLICATION: 500 OINTMENT TOPICAL at 21:44

## 2020-05-30 RX ADMIN — OLANZAPINE 10 MG: 10 TABLET, FILM COATED ORAL at 21:44

## 2020-05-30 RX ADMIN — ATORVASTATIN CALCIUM 20 MG: 20 TABLET, FILM COATED ORAL at 17:30

## 2020-05-30 RX ADMIN — AMPICILLIN SODIUM AND SULBACTAM SODIUM 3 G: 2; 1 INJECTION, POWDER, FOR SOLUTION INTRAMUSCULAR; INTRAVENOUS at 12:18

## 2020-05-30 RX ADMIN — PANTOPRAZOLE SODIUM 40 MG: 40 TABLET, DELAYED RELEASE ORAL at 05:11

## 2020-05-30 RX ADMIN — Medication 250 MG: at 08:52

## 2020-05-31 PROCEDURE — 99232 SBSQ HOSP IP/OBS MODERATE 35: CPT | Performed by: PSYCHIATRY & NEUROLOGY

## 2020-05-31 RX ADMIN — MESALAMINE 1200 MG: 400 CAPSULE, DELAYED RELEASE ORAL at 21:29

## 2020-05-31 RX ADMIN — GABAPENTIN 200 MG: 100 CAPSULE ORAL at 09:01

## 2020-05-31 RX ADMIN — FLUOXETINE 30 MG: 20 CAPSULE ORAL at 09:00

## 2020-05-31 RX ADMIN — Medication 250 MG: at 21:29

## 2020-05-31 RX ADMIN — GABAPENTIN 200 MG: 100 CAPSULE ORAL at 16:11

## 2020-05-31 RX ADMIN — MESALAMINE 1200 MG: 400 CAPSULE, DELAYED RELEASE ORAL at 08:59

## 2020-05-31 RX ADMIN — BACITRACIN ZINC 1 SMALL APPLICATION: 500 OINTMENT TOPICAL at 08:58

## 2020-05-31 RX ADMIN — BACITRACIN ZINC 1 SMALL APPLICATION: 500 OINTMENT TOPICAL at 21:31

## 2020-05-31 RX ADMIN — ATORVASTATIN CALCIUM 20 MG: 20 TABLET, FILM COATED ORAL at 16:11

## 2020-05-31 RX ADMIN — MESALAMINE 1200 MG: 400 CAPSULE, DELAYED RELEASE ORAL at 16:11

## 2020-05-31 RX ADMIN — LEVOTHYROXINE SODIUM 88 MCG: 88 TABLET ORAL at 05:22

## 2020-05-31 RX ADMIN — CYANOCOBALAMIN TAB 1000 MCG 500 MCG: 1000 TAB at 09:00

## 2020-05-31 RX ADMIN — CARVEDILOL 3.12 MG: 3.12 TABLET, FILM COATED ORAL at 16:11

## 2020-05-31 RX ADMIN — GABAPENTIN 200 MG: 100 CAPSULE ORAL at 21:29

## 2020-05-31 RX ADMIN — OLANZAPINE 10 MG: 10 TABLET, FILM COATED ORAL at 21:29

## 2020-05-31 RX ADMIN — PANTOPRAZOLE SODIUM 40 MG: 40 TABLET, DELAYED RELEASE ORAL at 05:22

## 2020-05-31 RX ADMIN — AMPICILLIN SODIUM AND SULBACTAM SODIUM 3 G: 2; 1 INJECTION, POWDER, FOR SOLUTION INTRAMUSCULAR; INTRAVENOUS at 13:58

## 2020-05-31 RX ADMIN — HYDROCORTISONE ACETATE 25 MG: 25 SUPPOSITORY RECTAL at 09:01

## 2020-05-31 RX ADMIN — Medication 250 MG: at 09:02

## 2020-06-01 ENCOUNTER — APPOINTMENT (INPATIENT)
Dept: PREOP/PACU | Facility: HOSPITAL | Age: 70
DRG: 885 | End: 2020-06-01
Payer: COMMERCIAL

## 2020-06-01 ENCOUNTER — ANESTHESIA EVENT (INPATIENT)
Dept: PREOP/PACU | Facility: HOSPITAL | Age: 70
DRG: 885 | End: 2020-06-01
Payer: COMMERCIAL

## 2020-06-01 ENCOUNTER — ANESTHESIA (INPATIENT)
Dept: PREOP/PACU | Facility: HOSPITAL | Age: 70
DRG: 885 | End: 2020-06-01
Payer: COMMERCIAL

## 2020-06-01 PROCEDURE — GZB2ZZZ ELECTROCONVULSIVE THERAPY, BILATERAL-SINGLE SEIZURE: ICD-10-PCS | Performed by: PSYCHIATRY & NEUROLOGY

## 2020-06-01 PROCEDURE — 99232 SBSQ HOSP IP/OBS MODERATE 35: CPT | Performed by: PSYCHIATRY & NEUROLOGY

## 2020-06-01 PROCEDURE — 90870 ELECTROCONVULSIVE THERAPY: CPT

## 2020-06-01 PROCEDURE — 90870 ELECTROCONVULSIVE THERAPY: CPT | Performed by: PSYCHIATRY & NEUROLOGY

## 2020-06-01 RX ORDER — ESMOLOL HYDROCHLORIDE 10 MG/ML
INJECTION INTRAVENOUS AS NEEDED
Status: DISCONTINUED | OUTPATIENT
Start: 2020-06-01 | End: 2020-06-01 | Stop reason: SURG

## 2020-06-01 RX ORDER — GLYCOPYRROLATE 0.2 MG/ML
INJECTION INTRAMUSCULAR; INTRAVENOUS AS NEEDED
Status: DISCONTINUED | OUTPATIENT
Start: 2020-06-01 | End: 2020-06-01 | Stop reason: SURG

## 2020-06-01 RX ORDER — ETOMIDATE 2 MG/ML
INJECTION INTRAVENOUS AS NEEDED
Status: DISCONTINUED | OUTPATIENT
Start: 2020-06-01 | End: 2020-06-01 | Stop reason: SURG

## 2020-06-01 RX ORDER — SUCCINYLCHOLINE/SOD CL,ISO/PF 100 MG/5ML
SYRINGE (ML) INTRAVENOUS AS NEEDED
Status: DISCONTINUED | OUTPATIENT
Start: 2020-06-01 | End: 2020-06-01 | Stop reason: SURG

## 2020-06-01 RX ORDER — SODIUM CHLORIDE 9 MG/ML
50 INJECTION, SOLUTION INTRAVENOUS CONTINUOUS
Status: DISCONTINUED | OUTPATIENT
Start: 2020-06-01 | End: 2020-06-05 | Stop reason: SURG

## 2020-06-01 RX ADMIN — GABAPENTIN 200 MG: 100 CAPSULE ORAL at 08:22

## 2020-06-01 RX ADMIN — SODIUM CHLORIDE: 0.9 INJECTION, SOLUTION INTRAVENOUS at 06:55

## 2020-06-01 RX ADMIN — SODIUM CHLORIDE 50 ML/HR: 0.9 INJECTION, SOLUTION INTRAVENOUS at 06:09

## 2020-06-01 RX ADMIN — HYDROCORTISONE ACETATE 25 MG: 25 SUPPOSITORY RECTAL at 18:31

## 2020-06-01 RX ADMIN — CARVEDILOL 3.12 MG: 3.12 TABLET, FILM COATED ORAL at 17:34

## 2020-06-01 RX ADMIN — MESALAMINE 1200 MG: 400 CAPSULE, DELAYED RELEASE ORAL at 17:34

## 2020-06-01 RX ADMIN — ETOMIDATE 20 MG: 2 INJECTION, SOLUTION INTRAVENOUS at 07:04

## 2020-06-01 RX ADMIN — AMPICILLIN SODIUM AND SULBACTAM SODIUM 3 G: 2; 1 INJECTION, POWDER, FOR SOLUTION INTRAMUSCULAR; INTRAVENOUS at 13:11

## 2020-06-01 RX ADMIN — BACITRACIN ZINC 1 SMALL APPLICATION: 500 OINTMENT TOPICAL at 08:19

## 2020-06-01 RX ADMIN — PANTOPRAZOLE SODIUM 40 MG: 40 TABLET, DELAYED RELEASE ORAL at 08:19

## 2020-06-01 RX ADMIN — GABAPENTIN 200 MG: 100 CAPSULE ORAL at 17:34

## 2020-06-01 RX ADMIN — CARVEDILOL 3.12 MG: 3.12 TABLET, FILM COATED ORAL at 05:43

## 2020-06-01 RX ADMIN — MESALAMINE 1200 MG: 400 CAPSULE, DELAYED RELEASE ORAL at 21:22

## 2020-06-01 RX ADMIN — LEVOTHYROXINE SODIUM 88 MCG: 88 TABLET ORAL at 08:22

## 2020-06-01 RX ADMIN — ATORVASTATIN CALCIUM 20 MG: 20 TABLET, FILM COATED ORAL at 17:33

## 2020-06-01 RX ADMIN — Medication 100 MG: at 07:04

## 2020-06-01 RX ADMIN — GABAPENTIN 200 MG: 100 CAPSULE ORAL at 21:22

## 2020-06-01 RX ADMIN — HYDROCORTISONE ACETATE 25 MG: 25 SUPPOSITORY RECTAL at 08:29

## 2020-06-01 RX ADMIN — CYANOCOBALAMIN TAB 1000 MCG 500 MCG: 1000 TAB at 08:22

## 2020-06-01 RX ADMIN — MESALAMINE 1200 MG: 400 CAPSULE, DELAYED RELEASE ORAL at 08:29

## 2020-06-01 RX ADMIN — OLANZAPINE 10 MG: 10 TABLET, FILM COATED ORAL at 21:22

## 2020-06-01 RX ADMIN — BACITRACIN ZINC 1 SMALL APPLICATION: 500 OINTMENT TOPICAL at 22:00

## 2020-06-01 RX ADMIN — ESMOLOL HYDROCHLORIDE 50 MG: 10 INJECTION, SOLUTION INTRAVENOUS at 07:04

## 2020-06-01 RX ADMIN — Medication 250 MG: at 21:22

## 2020-06-01 RX ADMIN — GLYCOPYRROLATE 0.2 MG: 0.2 INJECTION, SOLUTION INTRAMUSCULAR; INTRAVENOUS at 07:00

## 2020-06-01 RX ADMIN — Medication 250 MG: at 08:19

## 2020-06-01 RX ADMIN — FLUOXETINE 30 MG: 20 CAPSULE ORAL at 08:19

## 2020-06-02 PROCEDURE — 99232 SBSQ HOSP IP/OBS MODERATE 35: CPT | Performed by: PSYCHIATRY & NEUROLOGY

## 2020-06-02 RX ADMIN — GABAPENTIN 200 MG: 100 CAPSULE ORAL at 08:30

## 2020-06-02 RX ADMIN — HYDROCORTISONE ACETATE 25 MG: 25 SUPPOSITORY RECTAL at 17:16

## 2020-06-02 RX ADMIN — BACITRACIN ZINC 1 SMALL APPLICATION: 500 OINTMENT TOPICAL at 21:48

## 2020-06-02 RX ADMIN — HYDROCORTISONE ACETATE 25 MG: 25 SUPPOSITORY RECTAL at 08:35

## 2020-06-02 RX ADMIN — Medication 250 MG: at 21:48

## 2020-06-02 RX ADMIN — CYANOCOBALAMIN TAB 1000 MCG 500 MCG: 1000 TAB at 08:30

## 2020-06-02 RX ADMIN — BACITRACIN ZINC 1 SMALL APPLICATION: 500 OINTMENT TOPICAL at 08:30

## 2020-06-02 RX ADMIN — ATORVASTATIN CALCIUM 20 MG: 20 TABLET, FILM COATED ORAL at 17:15

## 2020-06-02 RX ADMIN — LEVOTHYROXINE SODIUM 88 MCG: 88 TABLET ORAL at 05:48

## 2020-06-02 RX ADMIN — PANTOPRAZOLE SODIUM 40 MG: 40 TABLET, DELAYED RELEASE ORAL at 05:48

## 2020-06-02 RX ADMIN — Medication 250 MG: at 08:30

## 2020-06-02 RX ADMIN — MESALAMINE 1200 MG: 400 CAPSULE, DELAYED RELEASE ORAL at 17:15

## 2020-06-02 RX ADMIN — GABAPENTIN 200 MG: 100 CAPSULE ORAL at 21:48

## 2020-06-02 RX ADMIN — FLUOXETINE 30 MG: 20 CAPSULE ORAL at 08:30

## 2020-06-02 RX ADMIN — MESALAMINE 1200 MG: 400 CAPSULE, DELAYED RELEASE ORAL at 21:48

## 2020-06-02 RX ADMIN — GABAPENTIN 200 MG: 100 CAPSULE ORAL at 17:15

## 2020-06-02 RX ADMIN — MESALAMINE 1200 MG: 400 CAPSULE, DELAYED RELEASE ORAL at 08:34

## 2020-06-02 RX ADMIN — OLANZAPINE 10 MG: 10 TABLET, FILM COATED ORAL at 21:48

## 2020-06-02 RX ADMIN — CARVEDILOL 3.12 MG: 3.12 TABLET, FILM COATED ORAL at 17:15

## 2020-06-03 ENCOUNTER — ANESTHESIA (INPATIENT)
Dept: GASTROENTEROLOGY | Facility: HOSPITAL | Age: 70
DRG: 885 | End: 2020-06-03
Payer: COMMERCIAL

## 2020-06-03 ENCOUNTER — ANESTHESIA EVENT (INPATIENT)
Dept: GASTROENTEROLOGY | Facility: HOSPITAL | Age: 70
DRG: 885 | End: 2020-06-03
Payer: COMMERCIAL

## 2020-06-03 LAB
ANION GAP SERPL CALCULATED.3IONS-SCNC: 6 MMOL/L (ref 5–14)
BUN SERPL-MCNC: 11 MG/DL (ref 5–25)
CALCIUM SERPL-MCNC: 9.2 MG/DL (ref 8.4–10.2)
CHLORIDE SERPL-SCNC: 103 MMOL/L (ref 97–108)
CO2 SERPL-SCNC: 29 MMOL/L (ref 22–30)
CREAT SERPL-MCNC: 0.71 MG/DL (ref 0.7–1.5)
EOSINOPHIL # BLD AUTO: 0.14 THOUSAND/UL (ref 0–0.4)
EOSINOPHIL NFR BLD MANUAL: 3 % (ref 0–6)
ERYTHROCYTE [DISTWIDTH] IN BLOOD BY AUTOMATED COUNT: 15.1 %
GFR SERPL CREATININE-BSD FRML MDRD: 97 ML/MIN/1.73SQ M
GLUCOSE P FAST SERPL-MCNC: 103 MG/DL (ref 70–99)
GLUCOSE SERPL-MCNC: 103 MG/DL (ref 70–99)
HCT VFR BLD AUTO: 37 % (ref 41–53)
HGB BLD-MCNC: 12.1 G/DL (ref 13.5–17.5)
LYMPHOCYTES # BLD AUTO: 1.15 THOUSAND/UL (ref 0.5–4)
LYMPHOCYTES # BLD AUTO: 25 % (ref 25–45)
MCH RBC QN AUTO: 29.9 PG (ref 26–34)
MCHC RBC AUTO-ENTMCNC: 32.8 G/DL (ref 31–36)
MCV RBC AUTO: 91 FL (ref 80–100)
MONOCYTES # BLD AUTO: 0.23 THOUSAND/UL (ref 0.2–0.9)
MONOCYTES NFR BLD AUTO: 5 % (ref 1–10)
NEUTS BAND NFR BLD MANUAL: 1 % (ref 0–8)
NEUTS SEG # BLD: 3.08 THOUSAND/UL (ref 1.8–7.8)
NEUTS SEG NFR BLD AUTO: 66 %
PLATELET # BLD AUTO: 291 THOUSANDS/UL (ref 150–450)
PLATELET BLD QL SMEAR: ADEQUATE
PMV BLD AUTO: 7.8 FL (ref 8.9–12.7)
POTASSIUM SERPL-SCNC: 3.9 MMOL/L (ref 3.6–5)
RBC # BLD AUTO: 4.07 MILLION/UL (ref 4.5–5.9)
RBC MORPH BLD: NORMAL
SARS-COV-2 RNA RESP QL NAA+PROBE: NEGATIVE
SODIUM SERPL-SCNC: 138 MMOL/L (ref 137–147)
TOTAL CELLS COUNTED SPEC: 100
WBC # BLD AUTO: 4.6 THOUSAND/UL (ref 4.5–11)

## 2020-06-03 PROCEDURE — 87635 SARS-COV-2 COVID-19 AMP PRB: CPT | Performed by: PHYSICIAN ASSISTANT

## 2020-06-03 PROCEDURE — 99232 SBSQ HOSP IP/OBS MODERATE 35: CPT | Performed by: PSYCHIATRY & NEUROLOGY

## 2020-06-03 PROCEDURE — 85007 BL SMEAR W/DIFF WBC COUNT: CPT | Performed by: FAMILY MEDICINE

## 2020-06-03 PROCEDURE — 99223 1ST HOSP IP/OBS HIGH 75: CPT | Performed by: INTERNAL MEDICINE

## 2020-06-03 PROCEDURE — 85027 COMPLETE CBC AUTOMATED: CPT | Performed by: FAMILY MEDICINE

## 2020-06-03 PROCEDURE — 80048 BASIC METABOLIC PNL TOTAL CA: CPT | Performed by: FAMILY MEDICINE

## 2020-06-03 RX ORDER — DIAPER,BRIEF,INFANT-TODD,DISP
EACH MISCELLANEOUS 3 TIMES DAILY
Status: DISCONTINUED | OUTPATIENT
Start: 2020-06-03 | End: 2020-06-09 | Stop reason: HOSPADM

## 2020-06-03 RX ORDER — SODIUM CHLORIDE 9 MG/ML
125 INJECTION, SOLUTION INTRAVENOUS CONTINUOUS
Status: DISCONTINUED | OUTPATIENT
Start: 2020-06-03 | End: 2020-06-05 | Stop reason: SURG

## 2020-06-03 RX ORDER — CEFAZOLIN SODIUM 1 G/50ML
1000 SOLUTION INTRAVENOUS ONCE
Status: DISCONTINUED | OUTPATIENT
Start: 2020-06-03 | End: 2020-06-03

## 2020-06-03 RX ADMIN — GABAPENTIN 200 MG: 100 CAPSULE ORAL at 17:08

## 2020-06-03 RX ADMIN — GABAPENTIN 200 MG: 100 CAPSULE ORAL at 08:45

## 2020-06-03 RX ADMIN — OLANZAPINE 10 MG: 10 TABLET, FILM COATED ORAL at 21:15

## 2020-06-03 RX ADMIN — HYDROCORTISONE 1 APPLICATION: 1 CREAM TOPICAL at 21:17

## 2020-06-03 RX ADMIN — BACITRACIN ZINC 1 SMALL APPLICATION: 500 OINTMENT TOPICAL at 08:43

## 2020-06-03 RX ADMIN — MESALAMINE 1200 MG: 400 CAPSULE, DELAYED RELEASE ORAL at 17:08

## 2020-06-03 RX ADMIN — HYDROCORTISONE: 1 CREAM TOPICAL at 10:59

## 2020-06-03 RX ADMIN — PANTOPRAZOLE SODIUM 40 MG: 40 TABLET, DELAYED RELEASE ORAL at 08:48

## 2020-06-03 RX ADMIN — Medication 250 MG: at 21:15

## 2020-06-03 RX ADMIN — HYDROCORTISONE ACETATE 25 MG: 25 SUPPOSITORY RECTAL at 08:47

## 2020-06-03 RX ADMIN — CARVEDILOL 3.12 MG: 3.12 TABLET, FILM COATED ORAL at 08:44

## 2020-06-03 RX ADMIN — HYDROCORTISONE: 1 CREAM TOPICAL at 17:18

## 2020-06-03 RX ADMIN — CYANOCOBALAMIN TAB 1000 MCG 500 MCG: 1000 TAB at 08:44

## 2020-06-03 RX ADMIN — BACITRACIN ZINC 1 SMALL APPLICATION: 500 OINTMENT TOPICAL at 21:15

## 2020-06-03 RX ADMIN — MESALAMINE 1200 MG: 400 CAPSULE, DELAYED RELEASE ORAL at 21:15

## 2020-06-03 RX ADMIN — CARVEDILOL 3.12 MG: 3.12 TABLET, FILM COATED ORAL at 17:08

## 2020-06-03 RX ADMIN — GABAPENTIN 200 MG: 100 CAPSULE ORAL at 21:15

## 2020-06-03 RX ADMIN — ATORVASTATIN CALCIUM 20 MG: 20 TABLET, FILM COATED ORAL at 17:08

## 2020-06-03 RX ADMIN — MESALAMINE 1200 MG: 400 CAPSULE, DELAYED RELEASE ORAL at 08:46

## 2020-06-03 RX ADMIN — Medication 250 MG: at 08:45

## 2020-06-03 RX ADMIN — FLUOXETINE 30 MG: 20 CAPSULE ORAL at 08:44

## 2020-06-03 RX ADMIN — LEVOTHYROXINE SODIUM 88 MCG: 88 TABLET ORAL at 08:48

## 2020-06-04 ENCOUNTER — ANESTHESIA EVENT (INPATIENT)
Dept: PREOP/PACU | Facility: HOSPITAL | Age: 70
DRG: 885 | End: 2020-06-04
Payer: COMMERCIAL

## 2020-06-04 PROCEDURE — 99232 SBSQ HOSP IP/OBS MODERATE 35: CPT | Performed by: PSYCHIATRY & NEUROLOGY

## 2020-06-04 RX ADMIN — MESALAMINE 1200 MG: 400 CAPSULE, DELAYED RELEASE ORAL at 17:01

## 2020-06-04 RX ADMIN — CYANOCOBALAMIN TAB 1000 MCG 500 MCG: 1000 TAB at 08:01

## 2020-06-04 RX ADMIN — OLANZAPINE 10 MG: 10 TABLET, FILM COATED ORAL at 21:09

## 2020-06-04 RX ADMIN — ATORVASTATIN CALCIUM 20 MG: 20 TABLET, FILM COATED ORAL at 17:02

## 2020-06-04 RX ADMIN — HYDROCORTISONE: 1 CREAM TOPICAL at 17:07

## 2020-06-04 RX ADMIN — FLUOXETINE 30 MG: 20 CAPSULE ORAL at 08:01

## 2020-06-04 RX ADMIN — CARVEDILOL 3.12 MG: 3.12 TABLET, FILM COATED ORAL at 07:57

## 2020-06-04 RX ADMIN — HYDROCORTISONE: 1 CREAM TOPICAL at 08:01

## 2020-06-04 RX ADMIN — PANTOPRAZOLE SODIUM 40 MG: 40 TABLET, DELAYED RELEASE ORAL at 05:20

## 2020-06-04 RX ADMIN — HYDROCORTISONE ACETATE 25 MG: 25 SUPPOSITORY RECTAL at 08:02

## 2020-06-04 RX ADMIN — LEVOTHYROXINE SODIUM 88 MCG: 88 TABLET ORAL at 05:20

## 2020-06-04 RX ADMIN — MESALAMINE 1200 MG: 400 CAPSULE, DELAYED RELEASE ORAL at 07:58

## 2020-06-04 RX ADMIN — CARVEDILOL 3.12 MG: 3.12 TABLET, FILM COATED ORAL at 17:02

## 2020-06-04 RX ADMIN — HYDROCORTISONE ACETATE 25 MG: 25 SUPPOSITORY RECTAL at 19:02

## 2020-06-04 RX ADMIN — BACITRACIN ZINC 1 SMALL APPLICATION: 500 OINTMENT TOPICAL at 08:02

## 2020-06-04 RX ADMIN — HYDROCORTISONE 1 APPLICATION: 1 CREAM TOPICAL at 21:46

## 2020-06-04 RX ADMIN — GABAPENTIN 200 MG: 100 CAPSULE ORAL at 07:57

## 2020-06-04 RX ADMIN — BACITRACIN ZINC 1 SMALL APPLICATION: 500 OINTMENT TOPICAL at 21:10

## 2020-06-04 RX ADMIN — GABAPENTIN 200 MG: 100 CAPSULE ORAL at 17:01

## 2020-06-04 RX ADMIN — Medication 250 MG: at 21:08

## 2020-06-04 RX ADMIN — Medication 250 MG: at 08:01

## 2020-06-04 RX ADMIN — GABAPENTIN 200 MG: 100 CAPSULE ORAL at 21:09

## 2020-06-04 RX ADMIN — MESALAMINE 1200 MG: 400 CAPSULE, DELAYED RELEASE ORAL at 21:08

## 2020-06-05 ENCOUNTER — ANESTHESIA (INPATIENT)
Dept: PREOP/PACU | Facility: HOSPITAL | Age: 70
DRG: 885 | End: 2020-06-05
Payer: COMMERCIAL

## 2020-06-05 ENCOUNTER — APPOINTMENT (INPATIENT)
Dept: PREOP/PACU | Facility: HOSPITAL | Age: 70
DRG: 885 | End: 2020-06-05
Payer: COMMERCIAL

## 2020-06-05 PROCEDURE — 99232 SBSQ HOSP IP/OBS MODERATE 35: CPT | Performed by: NURSE PRACTITIONER

## 2020-06-05 PROCEDURE — 90870 ELECTROCONVULSIVE THERAPY: CPT | Performed by: PSYCHIATRY & NEUROLOGY

## 2020-06-05 PROCEDURE — GZB2ZZZ ELECTROCONVULSIVE THERAPY, BILATERAL-SINGLE SEIZURE: ICD-10-PCS | Performed by: PSYCHIATRY & NEUROLOGY

## 2020-06-05 PROCEDURE — 90870 ELECTROCONVULSIVE THERAPY: CPT

## 2020-06-05 RX ORDER — SUCCINYLCHOLINE/SOD CL,ISO/PF 100 MG/5ML
SYRINGE (ML) INTRAVENOUS AS NEEDED
Status: DISCONTINUED | OUTPATIENT
Start: 2020-06-05 | End: 2020-06-05 | Stop reason: SURG

## 2020-06-05 RX ORDER — ESMOLOL HYDROCHLORIDE 10 MG/ML
INJECTION INTRAVENOUS AS NEEDED
Status: DISCONTINUED | OUTPATIENT
Start: 2020-06-05 | End: 2020-06-05 | Stop reason: SURG

## 2020-06-05 RX ORDER — ETOMIDATE 2 MG/ML
INJECTION INTRAVENOUS AS NEEDED
Status: DISCONTINUED | OUTPATIENT
Start: 2020-06-05 | End: 2020-06-05 | Stop reason: SURG

## 2020-06-05 RX ORDER — GLYCOPYRROLATE 0.2 MG/ML
INJECTION INTRAMUSCULAR; INTRAVENOUS AS NEEDED
Status: DISCONTINUED | OUTPATIENT
Start: 2020-06-05 | End: 2020-06-05 | Stop reason: SURG

## 2020-06-05 RX ORDER — SODIUM CHLORIDE 9 MG/ML
50 INJECTION, SOLUTION INTRAVENOUS CONTINUOUS
Status: DISCONTINUED | OUTPATIENT
Start: 2020-06-05 | End: 2020-06-05 | Stop reason: SURG

## 2020-06-05 RX ORDER — SODIUM CHLORIDE 9 MG/ML
125 INJECTION, SOLUTION INTRAVENOUS CONTINUOUS
Status: DISCONTINUED | OUTPATIENT
Start: 2020-06-05 | End: 2020-06-05 | Stop reason: SURG

## 2020-06-05 RX ADMIN — Medication 250 MG: at 08:29

## 2020-06-05 RX ADMIN — HYDROCORTISONE ACETATE 25 MG: 25 SUPPOSITORY RECTAL at 08:30

## 2020-06-05 RX ADMIN — GABAPENTIN 200 MG: 100 CAPSULE ORAL at 08:26

## 2020-06-05 RX ADMIN — Medication 250 MG: at 21:17

## 2020-06-05 RX ADMIN — MESALAMINE 1200 MG: 400 CAPSULE, DELAYED RELEASE ORAL at 21:16

## 2020-06-05 RX ADMIN — Medication 100 MG: at 07:47

## 2020-06-05 RX ADMIN — GABAPENTIN 200 MG: 100 CAPSULE ORAL at 21:17

## 2020-06-05 RX ADMIN — GABAPENTIN 200 MG: 100 CAPSULE ORAL at 17:00

## 2020-06-05 RX ADMIN — CARVEDILOL 3.12 MG: 3.12 TABLET, FILM COATED ORAL at 05:31

## 2020-06-05 RX ADMIN — ESMOLOL HYDROCHLORIDE 50 MG: 10 INJECTION, SOLUTION INTRAVENOUS at 07:47

## 2020-06-05 RX ADMIN — ETOMIDATE 20 MG: 2 INJECTION, SOLUTION INTRAVENOUS at 07:47

## 2020-06-05 RX ADMIN — CYANOCOBALAMIN TAB 1000 MCG 500 MCG: 1000 TAB at 08:27

## 2020-06-05 RX ADMIN — HYDROCORTISONE: 1 CREAM TOPICAL at 21:17

## 2020-06-05 RX ADMIN — FLUOXETINE 30 MG: 20 CAPSULE ORAL at 08:27

## 2020-06-05 RX ADMIN — MESALAMINE 1200 MG: 400 CAPSULE, DELAYED RELEASE ORAL at 08:29

## 2020-06-05 RX ADMIN — HYDROCORTISONE 1 APPLICATION: 1 CREAM TOPICAL at 08:29

## 2020-06-05 RX ADMIN — BACITRACIN ZINC 1 SMALL APPLICATION: 500 OINTMENT TOPICAL at 08:28

## 2020-06-05 RX ADMIN — HYDROCORTISONE ACETATE 25 MG: 25 SUPPOSITORY RECTAL at 22:09

## 2020-06-05 RX ADMIN — CARVEDILOL 3.12 MG: 3.12 TABLET, FILM COATED ORAL at 17:00

## 2020-06-05 RX ADMIN — LEVOTHYROXINE SODIUM 88 MCG: 88 TABLET ORAL at 08:25

## 2020-06-05 RX ADMIN — SODIUM CHLORIDE 50 ML/HR: 0.9 INJECTION, SOLUTION INTRAVENOUS at 07:42

## 2020-06-05 RX ADMIN — MESALAMINE 1200 MG: 400 CAPSULE, DELAYED RELEASE ORAL at 17:00

## 2020-06-05 RX ADMIN — BACITRACIN ZINC 1 SMALL APPLICATION: 500 OINTMENT TOPICAL at 21:16

## 2020-06-05 RX ADMIN — HYDROCORTISONE 1 APPLICATION: 1 CREAM TOPICAL at 16:45

## 2020-06-05 RX ADMIN — GLYCOPYRROLATE 0.2 MG: 0.2 INJECTION, SOLUTION INTRAMUSCULAR; INTRAVENOUS at 07:47

## 2020-06-05 RX ADMIN — ATORVASTATIN CALCIUM 20 MG: 20 TABLET, FILM COATED ORAL at 17:01

## 2020-06-05 RX ADMIN — PANTOPRAZOLE SODIUM 40 MG: 40 TABLET, DELAYED RELEASE ORAL at 08:25

## 2020-06-05 RX ADMIN — OLANZAPINE 10 MG: 10 TABLET, FILM COATED ORAL at 21:17

## 2020-06-06 PROCEDURE — 99232 SBSQ HOSP IP/OBS MODERATE 35: CPT | Performed by: NURSE PRACTITIONER

## 2020-06-06 RX ADMIN — Medication 250 MG: at 08:03

## 2020-06-06 RX ADMIN — LEVOTHYROXINE SODIUM 88 MCG: 88 TABLET ORAL at 05:35

## 2020-06-06 RX ADMIN — HYDROCORTISONE 1 APPLICATION: 1 CREAM TOPICAL at 15:00

## 2020-06-06 RX ADMIN — CARVEDILOL 3.12 MG: 3.12 TABLET, FILM COATED ORAL at 15:46

## 2020-06-06 RX ADMIN — MESALAMINE 1200 MG: 400 CAPSULE, DELAYED RELEASE ORAL at 15:01

## 2020-06-06 RX ADMIN — HYDROCORTISONE ACETATE 25 MG: 25 SUPPOSITORY RECTAL at 08:12

## 2020-06-06 RX ADMIN — MESALAMINE 1200 MG: 400 CAPSULE, DELAYED RELEASE ORAL at 07:54

## 2020-06-06 RX ADMIN — OLANZAPINE 10 MG: 10 TABLET, FILM COATED ORAL at 21:24

## 2020-06-06 RX ADMIN — GABAPENTIN 200 MG: 100 CAPSULE ORAL at 21:24

## 2020-06-06 RX ADMIN — HYDROCORTISONE 1 APPLICATION: 1 CREAM TOPICAL at 21:26

## 2020-06-06 RX ADMIN — MESALAMINE 1200 MG: 400 CAPSULE, DELAYED RELEASE ORAL at 21:24

## 2020-06-06 RX ADMIN — CARVEDILOL 3.12 MG: 3.12 TABLET, FILM COATED ORAL at 07:54

## 2020-06-06 RX ADMIN — GABAPENTIN 200 MG: 100 CAPSULE ORAL at 07:54

## 2020-06-06 RX ADMIN — ATORVASTATIN CALCIUM 20 MG: 20 TABLET, FILM COATED ORAL at 15:35

## 2020-06-06 RX ADMIN — BACITRACIN ZINC 1 SMALL APPLICATION: 500 OINTMENT TOPICAL at 08:04

## 2020-06-06 RX ADMIN — HYDROCORTISONE 1 APPLICATION: 1 CREAM TOPICAL at 08:03

## 2020-06-06 RX ADMIN — CYANOCOBALAMIN TAB 1000 MCG 500 MCG: 1000 TAB at 08:03

## 2020-06-06 RX ADMIN — PANTOPRAZOLE SODIUM 40 MG: 40 TABLET, DELAYED RELEASE ORAL at 05:35

## 2020-06-06 RX ADMIN — HYDROCORTISONE ACETATE 25 MG: 25 SUPPOSITORY RECTAL at 17:14

## 2020-06-06 RX ADMIN — GABAPENTIN 200 MG: 100 CAPSULE ORAL at 15:01

## 2020-06-06 RX ADMIN — BACITRACIN ZINC 1 SMALL APPLICATION: 500 OINTMENT TOPICAL at 22:12

## 2020-06-06 RX ADMIN — Medication 250 MG: at 21:24

## 2020-06-06 RX ADMIN — FLUOXETINE 30 MG: 20 CAPSULE ORAL at 08:02

## 2020-06-07 PROCEDURE — 99232 SBSQ HOSP IP/OBS MODERATE 35: CPT | Performed by: NURSE PRACTITIONER

## 2020-06-07 RX ADMIN — MESALAMINE 1200 MG: 400 CAPSULE, DELAYED RELEASE ORAL at 15:12

## 2020-06-07 RX ADMIN — ATORVASTATIN CALCIUM 20 MG: 20 TABLET, FILM COATED ORAL at 15:38

## 2020-06-07 RX ADMIN — HYDROCORTISONE: 1 CREAM TOPICAL at 21:04

## 2020-06-07 RX ADMIN — Medication 250 MG: at 08:43

## 2020-06-07 RX ADMIN — PANTOPRAZOLE SODIUM 40 MG: 40 TABLET, DELAYED RELEASE ORAL at 05:42

## 2020-06-07 RX ADMIN — HYDROCORTISONE: 1 CREAM TOPICAL at 08:44

## 2020-06-07 RX ADMIN — Medication 250 MG: at 21:04

## 2020-06-07 RX ADMIN — HYDROCORTISONE 1 APPLICATION: 1 CREAM TOPICAL at 15:16

## 2020-06-07 RX ADMIN — GABAPENTIN 200 MG: 100 CAPSULE ORAL at 07:49

## 2020-06-07 RX ADMIN — HYDROCORTISONE ACETATE 25 MG: 25 SUPPOSITORY RECTAL at 17:05

## 2020-06-07 RX ADMIN — LEVOTHYROXINE SODIUM 88 MCG: 88 TABLET ORAL at 05:42

## 2020-06-07 RX ADMIN — BACITRACIN ZINC 1 SMALL APPLICATION: 500 OINTMENT TOPICAL at 21:03

## 2020-06-07 RX ADMIN — CYANOCOBALAMIN TAB 1000 MCG 500 MCG: 1000 TAB at 08:43

## 2020-06-07 RX ADMIN — BACITRACIN ZINC 1 SMALL APPLICATION: 500 OINTMENT TOPICAL at 08:43

## 2020-06-07 RX ADMIN — GABAPENTIN 200 MG: 100 CAPSULE ORAL at 21:03

## 2020-06-07 RX ADMIN — MESALAMINE 1200 MG: 400 CAPSULE, DELAYED RELEASE ORAL at 21:03

## 2020-06-07 RX ADMIN — OLANZAPINE 10 MG: 10 TABLET, FILM COATED ORAL at 21:03

## 2020-06-07 RX ADMIN — FLUOXETINE 30 MG: 20 CAPSULE ORAL at 08:43

## 2020-06-07 RX ADMIN — HYDROCORTISONE ACETATE 25 MG: 25 SUPPOSITORY RECTAL at 08:44

## 2020-06-07 RX ADMIN — MESALAMINE 1200 MG: 400 CAPSULE, DELAYED RELEASE ORAL at 07:49

## 2020-06-07 RX ADMIN — GABAPENTIN 200 MG: 100 CAPSULE ORAL at 15:12

## 2020-06-08 ENCOUNTER — APPOINTMENT (INPATIENT)
Dept: PREOP/PACU | Facility: HOSPITAL | Age: 70
DRG: 885 | End: 2020-06-08
Payer: COMMERCIAL

## 2020-06-08 ENCOUNTER — ANESTHESIA EVENT (INPATIENT)
Dept: PREOP/PACU | Facility: HOSPITAL | Age: 70
DRG: 885 | End: 2020-06-08
Payer: COMMERCIAL

## 2020-06-08 ENCOUNTER — ANESTHESIA (INPATIENT)
Dept: PREOP/PACU | Facility: HOSPITAL | Age: 70
DRG: 885 | End: 2020-06-08
Payer: COMMERCIAL

## 2020-06-08 PROCEDURE — 90870 ELECTROCONVULSIVE THERAPY: CPT | Performed by: PSYCHIATRY & NEUROLOGY

## 2020-06-08 PROCEDURE — 90870 ELECTROCONVULSIVE THERAPY: CPT

## 2020-06-08 PROCEDURE — GZB2ZZZ ELECTROCONVULSIVE THERAPY, BILATERAL-SINGLE SEIZURE: ICD-10-PCS | Performed by: PSYCHIATRY & NEUROLOGY

## 2020-06-08 RX ORDER — SUCCINYLCHOLINE/SOD CL,ISO/PF 100 MG/5ML
SYRINGE (ML) INTRAVENOUS AS NEEDED
Status: DISCONTINUED | OUTPATIENT
Start: 2020-06-08 | End: 2020-06-08 | Stop reason: SURG

## 2020-06-08 RX ORDER — SODIUM CHLORIDE 9 MG/ML
125 INJECTION, SOLUTION INTRAVENOUS CONTINUOUS
Status: DISCONTINUED | OUTPATIENT
Start: 2020-06-08 | End: 2020-06-09 | Stop reason: HOSPADM

## 2020-06-08 RX ORDER — ETOMIDATE 2 MG/ML
INJECTION INTRAVENOUS AS NEEDED
Status: DISCONTINUED | OUTPATIENT
Start: 2020-06-08 | End: 2020-06-08 | Stop reason: SURG

## 2020-06-08 RX ORDER — SODIUM CHLORIDE, SODIUM LACTATE, POTASSIUM CHLORIDE, CALCIUM CHLORIDE 600; 310; 30; 20 MG/100ML; MG/100ML; MG/100ML; MG/100ML
INJECTION, SOLUTION INTRAVENOUS CONTINUOUS PRN
Status: DISCONTINUED | OUTPATIENT
Start: 2020-06-08 | End: 2020-06-08 | Stop reason: SURG

## 2020-06-08 RX ORDER — ESMOLOL HYDROCHLORIDE 10 MG/ML
INJECTION INTRAVENOUS AS NEEDED
Status: DISCONTINUED | OUTPATIENT
Start: 2020-06-08 | End: 2020-06-08 | Stop reason: SURG

## 2020-06-08 RX ORDER — GLYCOPYRROLATE 0.2 MG/ML
INJECTION INTRAMUSCULAR; INTRAVENOUS AS NEEDED
Status: DISCONTINUED | OUTPATIENT
Start: 2020-06-08 | End: 2020-06-08 | Stop reason: SURG

## 2020-06-08 RX ADMIN — ETOMIDATE 20 MG: 2 INJECTION, SOLUTION INTRAVENOUS at 07:15

## 2020-06-08 RX ADMIN — HYDROCORTISONE: 1 CREAM TOPICAL at 21:15

## 2020-06-08 RX ADMIN — ESMOLOL HYDROCHLORIDE 50 MG: 10 INJECTION, SOLUTION INTRAVENOUS at 07:15

## 2020-06-08 RX ADMIN — HYDROCORTISONE: 1 CREAM TOPICAL at 17:28

## 2020-06-08 RX ADMIN — BACITRACIN ZINC 1 SMALL APPLICATION: 500 OINTMENT TOPICAL at 08:30

## 2020-06-08 RX ADMIN — CARVEDILOL 3.12 MG: 3.12 TABLET, FILM COATED ORAL at 05:29

## 2020-06-08 RX ADMIN — GABAPENTIN 200 MG: 100 CAPSULE ORAL at 17:30

## 2020-06-08 RX ADMIN — HYDROCORTISONE: 1 CREAM TOPICAL at 08:30

## 2020-06-08 RX ADMIN — LEVOTHYROXINE SODIUM 88 MCG: 88 TABLET ORAL at 08:31

## 2020-06-08 RX ADMIN — MESALAMINE 1200 MG: 400 CAPSULE, DELAYED RELEASE ORAL at 21:15

## 2020-06-08 RX ADMIN — SODIUM CHLORIDE 125 ML/HR: 0.9 INJECTION, SOLUTION INTRAVENOUS at 07:11

## 2020-06-08 RX ADMIN — MESALAMINE 1200 MG: 400 CAPSULE, DELAYED RELEASE ORAL at 08:30

## 2020-06-08 RX ADMIN — Medication 100 MG: at 07:15

## 2020-06-08 RX ADMIN — Medication 250 MG: at 08:31

## 2020-06-08 RX ADMIN — ATORVASTATIN CALCIUM 20 MG: 20 TABLET, FILM COATED ORAL at 17:31

## 2020-06-08 RX ADMIN — MESALAMINE 1200 MG: 400 CAPSULE, DELAYED RELEASE ORAL at 17:29

## 2020-06-08 RX ADMIN — GLYCOPYRROLATE 0.2 MG: 0.2 INJECTION, SOLUTION INTRAMUSCULAR; INTRAVENOUS at 07:10

## 2020-06-08 RX ADMIN — HYDROCORTISONE ACETATE 25 MG: 25 SUPPOSITORY RECTAL at 08:32

## 2020-06-08 RX ADMIN — CARVEDILOL 3.12 MG: 3.12 TABLET, FILM COATED ORAL at 17:31

## 2020-06-08 RX ADMIN — BACITRACIN ZINC 1 SMALL APPLICATION: 500 OINTMENT TOPICAL at 21:15

## 2020-06-08 RX ADMIN — GABAPENTIN 200 MG: 100 CAPSULE ORAL at 08:31

## 2020-06-08 RX ADMIN — Medication 250 MG: at 21:16

## 2020-06-08 RX ADMIN — GABAPENTIN 200 MG: 100 CAPSULE ORAL at 21:16

## 2020-06-08 RX ADMIN — SODIUM CHLORIDE, SODIUM LACTATE, POTASSIUM CHLORIDE, AND CALCIUM CHLORIDE: .6; .31; .03; .02 INJECTION, SOLUTION INTRAVENOUS at 07:03

## 2020-06-08 RX ADMIN — PANTOPRAZOLE SODIUM 40 MG: 40 TABLET, DELAYED RELEASE ORAL at 08:31

## 2020-06-08 RX ADMIN — CYANOCOBALAMIN TAB 1000 MCG 500 MCG: 1000 TAB at 08:31

## 2020-06-08 RX ADMIN — HYDROCORTISONE ACETATE 25 MG: 25 SUPPOSITORY RECTAL at 17:30

## 2020-06-08 RX ADMIN — OLANZAPINE 10 MG: 10 TABLET, FILM COATED ORAL at 21:16

## 2020-06-08 RX ADMIN — FLUOXETINE 30 MG: 20 CAPSULE ORAL at 08:31

## 2020-06-09 VITALS
DIASTOLIC BLOOD PRESSURE: 64 MMHG | WEIGHT: 166.31 LBS | RESPIRATION RATE: 16 BRPM | HEART RATE: 80 BPM | BODY MASS INDEX: 22.53 KG/M2 | SYSTOLIC BLOOD PRESSURE: 149 MMHG | TEMPERATURE: 97.7 F | HEIGHT: 72 IN | OXYGEN SATURATION: 96 %

## 2020-06-09 DIAGNOSIS — K50.90 CROHN DISEASE (HCC): ICD-10-CM

## 2020-06-09 PROCEDURE — 99238 HOSP IP/OBS DSCHRG MGMT 30/<: CPT | Performed by: PSYCHIATRY & NEUROLOGY

## 2020-06-09 RX ORDER — ATORVASTATIN CALCIUM 20 MG/1
20 TABLET, FILM COATED ORAL
Qty: 30 TABLET | Refills: 0 | Status: SHIPPED | OUTPATIENT
Start: 2020-06-09 | End: 2020-07-01

## 2020-06-09 RX ORDER — CHOLECALCIFEROL (VITAMIN D3) 125 MCG
500 CAPSULE ORAL DAILY
Qty: 30 TABLET | Refills: 0 | Status: SHIPPED | OUTPATIENT
Start: 2020-06-09 | End: 2020-07-01

## 2020-06-09 RX ORDER — FLUOXETINE 10 MG/1
30 CAPSULE ORAL DAILY
Qty: 90 CAPSULE | Refills: 0 | Status: SHIPPED | OUTPATIENT
Start: 2020-06-10

## 2020-06-09 RX ORDER — GINSENG 100 MG
1 CAPSULE ORAL 2 TIMES DAILY
Qty: 15 G | Refills: 0 | Status: SHIPPED | OUTPATIENT
Start: 2020-06-09

## 2020-06-09 RX ORDER — PANTOPRAZOLE SODIUM 40 MG/1
40 TABLET, DELAYED RELEASE ORAL
Qty: 30 TABLET | Refills: 0 | Status: SHIPPED | OUTPATIENT
Start: 2020-06-10

## 2020-06-09 RX ORDER — HYDROCORTISONE ACETATE 25 MG/1
25 SUPPOSITORY RECTAL 2 TIMES DAILY
Qty: 12 SUPPOSITORY | Refills: 0 | Status: SHIPPED | OUTPATIENT
Start: 2020-06-09

## 2020-06-09 RX ORDER — GABAPENTIN 100 MG/1
200 CAPSULE ORAL 3 TIMES DAILY
Qty: 90 CAPSULE | Refills: 1 | Status: SHIPPED | OUTPATIENT
Start: 2020-06-09 | End: 2020-06-26

## 2020-06-09 RX ORDER — MESALAMINE 400 MG/1
1200 CAPSULE, DELAYED RELEASE ORAL 3 TIMES DAILY
Qty: 90 CAPSULE | Refills: 0 | OUTPATIENT
Start: 2020-06-09

## 2020-06-09 RX ORDER — MESALAMINE 400 MG/1
1200 CAPSULE, DELAYED RELEASE ORAL 3 TIMES DAILY
Qty: 90 CAPSULE | Refills: 0 | Status: SHIPPED | OUTPATIENT
Start: 2020-06-09 | End: 2020-11-18 | Stop reason: ALTCHOICE

## 2020-06-09 RX ORDER — DIAPER,BRIEF,INFANT-TODD,DISP
EACH MISCELLANEOUS 3 TIMES DAILY
Qty: 30 G | Refills: 0 | Status: SHIPPED | OUTPATIENT
Start: 2020-06-09

## 2020-06-09 RX ORDER — SACCHAROMYCES BOULARDII 250 MG
250 CAPSULE ORAL 2 TIMES DAILY
Qty: 60 CAPSULE | Refills: 0 | Status: SHIPPED | OUTPATIENT
Start: 2020-06-09

## 2020-06-09 RX ORDER — CARVEDILOL 3.12 MG/1
3.12 TABLET ORAL 2 TIMES DAILY WITH MEALS
Qty: 30 TABLET | Refills: 0 | Status: SHIPPED | OUTPATIENT
Start: 2020-06-09

## 2020-06-09 RX ORDER — OLANZAPINE 10 MG/1
10 TABLET ORAL
Qty: 30 TABLET | Refills: 0 | Status: SHIPPED | OUTPATIENT
Start: 2020-06-09

## 2020-06-09 RX ORDER — LEVOTHYROXINE SODIUM 88 UG/1
88 TABLET ORAL
Qty: 30 TABLET | Refills: 0 | Status: SHIPPED | OUTPATIENT
Start: 2020-06-10

## 2020-06-09 RX ORDER — SODIUM CHLORIDE 9 MG/ML
125 INJECTION, SOLUTION INTRAVENOUS CONTINUOUS
Status: CANCELLED | OUTPATIENT
Start: 2020-06-09

## 2020-06-09 RX ADMIN — HYDROCORTISONE: 1 CREAM TOPICAL at 17:12

## 2020-06-09 RX ADMIN — CARVEDILOL 3.12 MG: 3.12 TABLET, FILM COATED ORAL at 08:23

## 2020-06-09 RX ADMIN — CARVEDILOL 3.12 MG: 3.12 TABLET, FILM COATED ORAL at 17:12

## 2020-06-09 RX ADMIN — ATORVASTATIN CALCIUM 20 MG: 20 TABLET, FILM COATED ORAL at 17:12

## 2020-06-09 RX ADMIN — HYDROCORTISONE: 1 CREAM TOPICAL at 08:28

## 2020-06-09 RX ADMIN — BACITRACIN ZINC 1 SMALL APPLICATION: 500 OINTMENT TOPICAL at 08:23

## 2020-06-09 RX ADMIN — Medication 250 MG: at 08:23

## 2020-06-09 RX ADMIN — MESALAMINE 1200 MG: 400 CAPSULE, DELAYED RELEASE ORAL at 17:11

## 2020-06-09 RX ADMIN — CYANOCOBALAMIN TAB 1000 MCG 500 MCG: 1000 TAB at 08:23

## 2020-06-09 RX ADMIN — GABAPENTIN 200 MG: 100 CAPSULE ORAL at 17:12

## 2020-06-09 RX ADMIN — GABAPENTIN 200 MG: 100 CAPSULE ORAL at 08:23

## 2020-06-09 RX ADMIN — HYDROCORTISONE ACETATE 25 MG: 25 SUPPOSITORY RECTAL at 08:24

## 2020-06-09 RX ADMIN — LEVOTHYROXINE SODIUM 88 MCG: 88 TABLET ORAL at 05:48

## 2020-06-09 RX ADMIN — PANTOPRAZOLE SODIUM 40 MG: 40 TABLET, DELAYED RELEASE ORAL at 05:48

## 2020-06-09 RX ADMIN — HYDROCORTISONE ACETATE 25 MG: 25 SUPPOSITORY RECTAL at 17:14

## 2020-06-09 RX ADMIN — FLUOXETINE 30 MG: 20 CAPSULE ORAL at 08:23

## 2020-06-09 RX ADMIN — MESALAMINE 1200 MG: 400 CAPSULE, DELAYED RELEASE ORAL at 08:23

## 2020-06-09 NOTE — TELEPHONE ENCOUNTER
The pharmacy should contact the physician who originally prescribed this medication    Instructions is what we renewed when he was an inpatient

## 2020-06-12 DIAGNOSIS — B34.9 VIRAL DISEASE: Primary | ICD-10-CM

## 2020-06-15 DIAGNOSIS — B34.9 VIRAL DISEASE: ICD-10-CM

## 2020-06-15 PROCEDURE — U0003 INFECTIOUS AGENT DETECTION BY NUCLEIC ACID (DNA OR RNA); SEVERE ACUTE RESPIRATORY SYNDROME CORONAVIRUS 2 (SARS-COV-2) (CORONAVIRUS DISEASE [COVID-19]), AMPLIFIED PROBE TECHNIQUE, MAKING USE OF HIGH THROUGHPUT TECHNOLOGIES AS DESCRIBED BY CMS-2020-01-R: HCPCS

## 2020-06-16 ENCOUNTER — ANESTHESIA EVENT (OUTPATIENT)
Dept: PREOP/PACU | Facility: HOSPITAL | Age: 70
End: 2020-06-16

## 2020-06-16 RX ORDER — SODIUM CHLORIDE 9 MG/ML
125 INJECTION, SOLUTION INTRAVENOUS CONTINUOUS
Status: CANCELLED | OUTPATIENT
Start: 2020-06-16

## 2020-06-17 ENCOUNTER — HOSPITAL ENCOUNTER (OUTPATIENT)
Dept: PREOP/PACU | Facility: HOSPITAL | Age: 70
Setting detail: OUTPATIENT SURGERY
Discharge: HOME | End: 2020-06-17
Admitting: ANESTHESIOLOGY
Payer: COMMERCIAL

## 2020-06-17 ENCOUNTER — ANESTHESIA (OUTPATIENT)
Dept: PREOP/PACU | Facility: HOSPITAL | Age: 70
End: 2020-06-17

## 2020-06-17 VITALS
HEART RATE: 78 BPM | DIASTOLIC BLOOD PRESSURE: 63 MMHG | TEMPERATURE: 98.4 F | OXYGEN SATURATION: 93 % | SYSTOLIC BLOOD PRESSURE: 130 MMHG | RESPIRATION RATE: 16 BRPM

## 2020-06-17 LAB — SARS-COV-2 RNA SPEC QL NAA+PROBE: NOT DETECTED

## 2020-06-17 PROCEDURE — 90870 ELECTROCONVULSIVE THERAPY: CPT

## 2020-06-17 RX ORDER — ETOMIDATE 2 MG/ML
INJECTION INTRAVENOUS AS NEEDED
Status: DISCONTINUED | OUTPATIENT
Start: 2020-06-17 | End: 2020-06-17 | Stop reason: SURG

## 2020-06-17 RX ORDER — SUCCINYLCHOLINE/SOD CL,ISO/PF 100 MG/5ML
SYRINGE (ML) INTRAVENOUS AS NEEDED
Status: DISCONTINUED | OUTPATIENT
Start: 2020-06-17 | End: 2020-06-17 | Stop reason: SURG

## 2020-06-17 RX ORDER — ESMOLOL HYDROCHLORIDE 10 MG/ML
INJECTION INTRAVENOUS AS NEEDED
Status: DISCONTINUED | OUTPATIENT
Start: 2020-06-17 | End: 2020-06-17 | Stop reason: SURG

## 2020-06-17 RX ORDER — SODIUM CHLORIDE, SODIUM LACTATE, POTASSIUM CHLORIDE, CALCIUM CHLORIDE 600; 310; 30; 20 MG/100ML; MG/100ML; MG/100ML; MG/100ML
INJECTION, SOLUTION INTRAVENOUS CONTINUOUS PRN
Status: DISCONTINUED | OUTPATIENT
Start: 2020-06-17 | End: 2020-06-17 | Stop reason: SURG

## 2020-06-17 RX ORDER — GLYCOPYRROLATE 0.2 MG/ML
INJECTION INTRAMUSCULAR; INTRAVENOUS AS NEEDED
Status: DISCONTINUED | OUTPATIENT
Start: 2020-06-17 | End: 2020-06-17 | Stop reason: SURG

## 2020-06-17 RX ADMIN — ESMOLOL HYDROCHLORIDE 50 MG: 10 INJECTION, SOLUTION INTRAVENOUS at 06:55

## 2020-06-17 RX ADMIN — GLYCOPYRROLATE 0.2 MG: 0.2 INJECTION, SOLUTION INTRAMUSCULAR; INTRAVENOUS at 06:49

## 2020-06-17 RX ADMIN — Medication 100 MG: at 06:55

## 2020-06-17 RX ADMIN — ETOMIDATE 20 MG: 2 INJECTION, SOLUTION INTRAVENOUS at 06:55

## 2020-06-17 RX ADMIN — SODIUM CHLORIDE, SODIUM LACTATE, POTASSIUM CHLORIDE, AND CALCIUM CHLORIDE: .6; .31; .03; .02 INJECTION, SOLUTION INTRAVENOUS at 06:48

## 2020-06-24 DIAGNOSIS — B34.9 VIRAL DISEASE: Primary | ICD-10-CM

## 2020-06-24 DIAGNOSIS — B34.9 VIRAL DISEASE: ICD-10-CM

## 2020-06-24 PROCEDURE — U0003 INFECTIOUS AGENT DETECTION BY NUCLEIC ACID (DNA OR RNA); SEVERE ACUTE RESPIRATORY SYNDROME CORONAVIRUS 2 (SARS-COV-2) (CORONAVIRUS DISEASE [COVID-19]), AMPLIFIED PROBE TECHNIQUE, MAKING USE OF HIGH THROUGHPUT TECHNOLOGIES AS DESCRIBED BY CMS-2020-01-R: HCPCS

## 2020-06-25 LAB — SARS-COV-2 RNA SPEC QL NAA+PROBE: NOT DETECTED

## 2020-06-26 DIAGNOSIS — G62.9 POLYNEUROPATHY: ICD-10-CM

## 2020-06-26 RX ORDER — GABAPENTIN 100 MG/1
200 CAPSULE ORAL 3 TIMES DAILY
Qty: 90 CAPSULE | Refills: 1 | Status: SHIPPED | OUTPATIENT
Start: 2020-06-26 | End: 2020-07-07

## 2020-06-29 DIAGNOSIS — B34.9 VIRAL DISEASE: ICD-10-CM

## 2020-06-29 DIAGNOSIS — B34.9 VIRAL DISEASE: Primary | ICD-10-CM

## 2020-06-29 PROCEDURE — U0003 INFECTIOUS AGENT DETECTION BY NUCLEIC ACID (DNA OR RNA); SEVERE ACUTE RESPIRATORY SYNDROME CORONAVIRUS 2 (SARS-COV-2) (CORONAVIRUS DISEASE [COVID-19]), AMPLIFIED PROBE TECHNIQUE, MAKING USE OF HIGH THROUGHPUT TECHNOLOGIES AS DESCRIBED BY CMS-2020-01-R: HCPCS

## 2020-07-01 ENCOUNTER — ANESTHESIA EVENT (OUTPATIENT)
Dept: PREOP/PACU | Facility: HOSPITAL | Age: 70
End: 2020-07-01

## 2020-07-01 DIAGNOSIS — E78.49 OTHER HYPERLIPIDEMIA: ICD-10-CM

## 2020-07-01 DIAGNOSIS — G62.9 POLYNEUROPATHY: ICD-10-CM

## 2020-07-01 LAB — SARS-COV-2 RNA SPEC QL NAA+PROBE: NOT DETECTED

## 2020-07-01 RX ORDER — SODIUM CHLORIDE 9 MG/ML
125 INJECTION, SOLUTION INTRAVENOUS CONTINUOUS
Status: CANCELLED | OUTPATIENT
Start: 2020-07-01

## 2020-07-01 RX ORDER — ATORVASTATIN CALCIUM 20 MG/1
TABLET, FILM COATED ORAL
Qty: 30 TABLET | Refills: 0 | Status: SHIPPED | OUTPATIENT
Start: 2020-07-01

## 2020-07-01 RX ORDER — CALCIUM CARB/VIT D3/MINERALS 600 MG-200
TABLET,CHEWABLE ORAL
Qty: 30 TABLET | Refills: 0 | Status: SHIPPED | OUTPATIENT
Start: 2020-07-01

## 2020-07-01 NOTE — ANESTHESIA PREPROCEDURE EVALUATION
Review of Systems/Medical History  Patient summary reviewed  Chart reviewed  No history of anesthetic complications     Cardiovascular  EKG reviewed, Exercise tolerance (METS): <4,  Hyperlipidemia, Past MI Unspecified site/ episode of care, CAD , CAD status: obstructive, No cardiac stents  No history of percutaneous transluminal coronary angioplasty, No dysrhythmias , No angina , No CHF , No PVD,   Comment: Coreg in AM,  Pulmonary  Not a smoker , No shortness of breath, No recent URI , No sleep apnea ,        GI/Hepatic    GERD well controlled,   Comment: Crohn's  Hx of PEG     Prostatic disorder, history of prostate cancer       Endo/Other  History of thyroid disease , hypothyroidism,   No obesity    GYN       Hematology  Anemia anemia of chronic disease,     Musculoskeletal    Comment: Compression T12 Arthritis     Neurology  Negative neurology ROS     Comment: Hx of polyneuropathy Psychology   Psychiatric history, Anxiety, Depression ,              Physical Exam    Airway    Mallampati score: II  TM Distance: >3 FB  Neck ROM: full     Dental       Cardiovascular  Cardiovascular exam normal    Pulmonary  Pulmonary exam normal     Other Findings        Anesthesia Plan  ASA Score- 3     Anesthesia Type- general with ASA Monitors  Additional Monitors:   Airway Plan:     Comment: Old charts reviewed  Pt known to me  No new changes in health  Plan Factors-Patient not instructed to abstain from smoking on day of procedure  Patient did not smoke on day of surgery  Induction- intravenous  Postoperative Plan-     Informed Consent- Anesthetic plan and risks discussed with patient  I personally reviewed this patient with the CRNA  Discussed and agreed on the Anesthesia Plan with the CRNA  Bharathi Cevallos

## 2020-07-02 ENCOUNTER — HOSPITAL ENCOUNTER (OUTPATIENT)
Dept: PREOP/PACU | Facility: HOSPITAL | Age: 70
Setting detail: OUTPATIENT SURGERY
Discharge: HOME | End: 2020-07-02
Payer: COMMERCIAL

## 2020-07-02 ENCOUNTER — ANESTHESIA (OUTPATIENT)
Dept: PREOP/PACU | Facility: HOSPITAL | Age: 70
End: 2020-07-02

## 2020-07-02 VITALS
SYSTOLIC BLOOD PRESSURE: 162 MMHG | DIASTOLIC BLOOD PRESSURE: 72 MMHG | HEART RATE: 55 BPM | HEIGHT: 72 IN | WEIGHT: 172 LBS | OXYGEN SATURATION: 95 % | TEMPERATURE: 97.5 F | BODY MASS INDEX: 23.3 KG/M2 | RESPIRATION RATE: 16 BRPM

## 2020-07-02 PROCEDURE — 90870 ELECTROCONVULSIVE THERAPY: CPT

## 2020-07-02 RX ORDER — SUCCINYLCHOLINE/SOD CL,ISO/PF 100 MG/5ML
SYRINGE (ML) INTRAVENOUS AS NEEDED
Status: DISCONTINUED | OUTPATIENT
Start: 2020-07-02 | End: 2020-07-02 | Stop reason: SURG

## 2020-07-02 RX ORDER — ETOMIDATE 2 MG/ML
INJECTION INTRAVENOUS AS NEEDED
Status: DISCONTINUED | OUTPATIENT
Start: 2020-07-02 | End: 2020-07-02 | Stop reason: SURG

## 2020-07-02 RX ORDER — GLYCOPYRROLATE 0.2 MG/ML
INJECTION INTRAMUSCULAR; INTRAVENOUS AS NEEDED
Status: DISCONTINUED | OUTPATIENT
Start: 2020-07-02 | End: 2020-07-02 | Stop reason: SURG

## 2020-07-02 RX ORDER — SODIUM CHLORIDE 9 MG/ML
125 INJECTION, SOLUTION INTRAVENOUS CONTINUOUS
Status: DISCONTINUED | OUTPATIENT
Start: 2020-07-02 | End: 2020-07-06 | Stop reason: HOSPADM

## 2020-07-02 RX ORDER — SODIUM CHLORIDE 9 MG/ML
75 INJECTION, SOLUTION INTRAVENOUS CONTINUOUS
Status: CANCELLED | OUTPATIENT
Start: 2020-07-02

## 2020-07-02 RX ORDER — ESMOLOL HYDROCHLORIDE 10 MG/ML
INJECTION INTRAVENOUS AS NEEDED
Status: DISCONTINUED | OUTPATIENT
Start: 2020-07-02 | End: 2020-07-02 | Stop reason: SURG

## 2020-07-02 RX ORDER — SODIUM CHLORIDE 9 MG/ML
INJECTION, SOLUTION INTRAVENOUS CONTINUOUS PRN
Status: DISCONTINUED | OUTPATIENT
Start: 2020-07-02 | End: 2020-07-02 | Stop reason: SURG

## 2020-07-02 RX ADMIN — GLYCOPYRROLATE 0.2 MG: 0.2 INJECTION, SOLUTION INTRAMUSCULAR; INTRAVENOUS at 07:23

## 2020-07-02 RX ADMIN — ESMOLOL HYDROCHLORIDE 50 MG: 10 INJECTION, SOLUTION INTRAVENOUS at 07:26

## 2020-07-02 RX ADMIN — SODIUM CHLORIDE 125 ML/HR: 0.9 INJECTION, SOLUTION INTRAVENOUS at 05:52

## 2020-07-02 RX ADMIN — SODIUM CHLORIDE: 0.9 INJECTION, SOLUTION INTRAVENOUS at 06:55

## 2020-07-02 RX ADMIN — Medication 100 MG: at 07:26

## 2020-07-02 RX ADMIN — ETOMIDATE 20 MG: 2 INJECTION, SOLUTION INTRAVENOUS at 07:26

## 2020-07-02 NOTE — PROCEDURES
Procedure Note - ECT  Sherryle Sessions 81 y o  male MRN: [id number]  The procedure was performed by Romeo Swenson MD under my direct supervision  Time out was taken with staff to confirm correct patient and correct procedure to be performed      Session Number:  Maintenance ECT # 3    Diagnosis:  Major depression, recurrent     ECT Type: Outpatient, Maintenance    Anesthesia: Brevital    Electrode Placement: Bilateral    Energy level:     % 80    Seizure Duration   EE sec  EMsec  PISI 84 1%  Results:Clinical seizure was satisfactory, Patient tolerated ECT well

## 2020-07-07 DIAGNOSIS — G62.9 POLYNEUROPATHY: ICD-10-CM

## 2020-07-07 RX ORDER — GABAPENTIN 100 MG/1
CAPSULE ORAL
Qty: 90 CAPSULE | Refills: 1 | Status: SHIPPED | OUTPATIENT
Start: 2020-07-07

## 2020-07-15 DIAGNOSIS — B34.9 VIRAL DISEASE: Primary | ICD-10-CM

## 2020-07-17 DIAGNOSIS — B34.9 VIRAL DISEASE: ICD-10-CM

## 2020-07-17 PROCEDURE — U0003 INFECTIOUS AGENT DETECTION BY NUCLEIC ACID (DNA OR RNA); SEVERE ACUTE RESPIRATORY SYNDROME CORONAVIRUS 2 (SARS-COV-2) (CORONAVIRUS DISEASE [COVID-19]), AMPLIFIED PROBE TECHNIQUE, MAKING USE OF HIGH THROUGHPUT TECHNOLOGIES AS DESCRIBED BY CMS-2020-01-R: HCPCS

## 2020-07-19 LAB
INPATIENT: NORMAL
SARS-COV-2 RNA SPEC QL NAA+PROBE: NOT DETECTED

## 2020-07-23 ENCOUNTER — ANESTHESIA EVENT (OUTPATIENT)
Dept: PREOP/PACU | Facility: HOSPITAL | Age: 70
End: 2020-07-23

## 2020-07-23 NOTE — ANESTHESIA PREPROCEDURE EVALUATION
Review of Systems/Medical History  Patient summary reviewed  Chart reviewed  No history of anesthetic complications     Cardiovascular  EKG reviewed, Exercise tolerance (METS): >4,  Hyperlipidemia, Past MI > 6 months,    Pulmonary  Not a smoker , No COPD , No recent URI , No sleep apnea ,        GI/Hepatic  Dysphagia,   GERD well controlled,   Comment: Crohn's      Prostatic disorder, benign prostatic hyperplasia and history of prostate cancer       Endo/Other  History of thyroid disease , hypothyroidism,      GYN       Hematology  Negative hematology ROS      Musculoskeletal    Comment: Hx of T12 compression fx Arthritis     Neurology  Negative neurology ROS     Comment: Idiopathic polyneuropathy Psychology   Psychiatric history, Depression , depressed and being treated for depression,   Chronic opioid dependence            Physical Exam    Airway    Mallampati score: II  TM Distance: >3 FB  Neck ROM: full     Dental       Cardiovascular  Cardiovascular exam normal    Pulmonary  Pulmonary exam normal     Other Findings        Anesthesia Plan  ASA Score- 3     Anesthesia Type- general with ASA Monitors  Additional Monitors:   Airway Plan:     Comment: Chart reviewed and pt states no new changes in health  No prior ECT issues  No questions voiced  Pt re consents to ECT  Plan Factors-Patient not instructed to abstain from smoking on day of procedure  Patient did not smoke on day of surgery  Induction- intravenous  Postoperative Plan-     Informed Consent- Anesthetic plan and risks discussed with patient  I personally reviewed this patient with the CRNA  Discussed and agreed on the Anesthesia Plan with the CRNA  Bharathi Cevallos

## 2020-07-24 ENCOUNTER — ANESTHESIA (OUTPATIENT)
Dept: PREOP/PACU | Facility: HOSPITAL | Age: 70
End: 2020-07-24

## 2020-07-28 ENCOUNTER — HOSPITAL ENCOUNTER (OUTPATIENT)
Dept: PREOP/PACU | Facility: HOSPITAL | Age: 70
Setting detail: OUTPATIENT SURGERY
Discharge: HOME | End: 2020-07-28
Payer: COMMERCIAL

## 2020-07-28 VITALS
RESPIRATION RATE: 18 BRPM | HEIGHT: 72 IN | BODY MASS INDEX: 23.03 KG/M2 | TEMPERATURE: 97.2 F | HEART RATE: 79 BPM | OXYGEN SATURATION: 94 % | SYSTOLIC BLOOD PRESSURE: 151 MMHG | DIASTOLIC BLOOD PRESSURE: 81 MMHG | WEIGHT: 170 LBS

## 2020-07-28 PROCEDURE — 90870 ELECTROCONVULSIVE THERAPY: CPT

## 2020-07-28 RX ORDER — SODIUM CHLORIDE 9 MG/ML
75 INJECTION, SOLUTION INTRAVENOUS CONTINUOUS
Status: CANCELLED | OUTPATIENT
Start: 2020-07-28

## 2020-07-28 RX ORDER — GLYCOPYRROLATE 0.2 MG/ML
INJECTION INTRAMUSCULAR; INTRAVENOUS AS NEEDED
Status: DISCONTINUED | OUTPATIENT
Start: 2020-07-28 | End: 2020-07-28 | Stop reason: SURG

## 2020-07-28 RX ORDER — ETOMIDATE 2 MG/ML
INJECTION INTRAVENOUS AS NEEDED
Status: DISCONTINUED | OUTPATIENT
Start: 2020-07-28 | End: 2020-07-28 | Stop reason: SURG

## 2020-07-28 RX ORDER — SUCCINYLCHOLINE/SOD CL,ISO/PF 100 MG/5ML
SYRINGE (ML) INTRAVENOUS AS NEEDED
Status: DISCONTINUED | OUTPATIENT
Start: 2020-07-28 | End: 2020-07-28 | Stop reason: SURG

## 2020-07-28 RX ORDER — SODIUM CHLORIDE 9 MG/ML
INJECTION, SOLUTION INTRAVENOUS CONTINUOUS PRN
Status: DISCONTINUED | OUTPATIENT
Start: 2020-07-28 | End: 2020-07-28 | Stop reason: SURG

## 2020-07-28 RX ORDER — SODIUM CHLORIDE 9 MG/ML
125 INJECTION, SOLUTION INTRAVENOUS CONTINUOUS
Status: DISCONTINUED | OUTPATIENT
Start: 2020-07-28 | End: 2020-08-01 | Stop reason: HOSPADM

## 2020-07-28 RX ORDER — ESMOLOL HYDROCHLORIDE 10 MG/ML
INJECTION INTRAVENOUS AS NEEDED
Status: DISCONTINUED | OUTPATIENT
Start: 2020-07-28 | End: 2020-07-28 | Stop reason: SURG

## 2020-07-28 RX ADMIN — SODIUM CHLORIDE: 0.9 INJECTION, SOLUTION INTRAVENOUS at 05:24

## 2020-07-28 RX ADMIN — GLYCOPYRROLATE 0.2 MG: 0.2 INJECTION, SOLUTION INTRAMUSCULAR; INTRAVENOUS at 06:56

## 2020-07-28 RX ADMIN — SODIUM CHLORIDE 125 ML/HR: 0.9 INJECTION, SOLUTION INTRAVENOUS at 05:25

## 2020-07-28 RX ADMIN — ETOMIDATE 20 MG: 2 INJECTION, SOLUTION INTRAVENOUS at 06:56

## 2020-07-28 RX ADMIN — Medication 100 MG: at 06:56

## 2020-07-28 RX ADMIN — ESMOLOL HYDROCHLORIDE 50 MG: 10 INJECTION, SOLUTION INTRAVENOUS at 06:56

## 2020-07-28 NOTE — NURSING NOTE
Received from PACU at [phone] after ECT  Alert and oriented  Denies headache or muscle pain  Respirations easy and non labored  IV fluids continue  Call bell in reach

## 2020-07-28 NOTE — ANESTHESIA POSTPROCEDURE EVALUATION
Post-Op Assessment Note    CV Status:  Stable  Pain Score: 0    Pain management: adequate     Mental Status:  Alert and awake   Hydration Status:  Euvolemic   PONV Controlled:  Controlled   Airway Patency:  Patent   Post Op Vitals Reviewed: Yes      Staff: Anesthesiologist, CRNA           /81 (07/28/20 0735)    Temp (!) 97 2 °F (36 2 °C) (07/28/20 0735)    Pulse 79 (07/28/20 0735)   Resp 18 (07/28/20 0735)    SpO2 94 % (07/28/20 0735)

## 2020-07-28 NOTE — DISCHARGE INSTRUCTIONS
Electroconvulsive Therapy   WHAT YOU NEED TO KNOW:   Electroconvulsive therapy (ECT) is a treatment that sends a small electric current to your brain to cause a seizure  The seizure affects the chemicals in your brain, which may make your brain cells work better  ECT is used to treat certain conditions, such as depression, that do not get better after medicines or other therapies have been tried  DISCHARGE INSTRUCTIONS:   Medicines:   · Antidepressants: You may be given this medicine to help decrease or prevent symptoms of depression  · Take your medicine as directed  Contact your healthcare provider if you think your medicine is not helping or if you have side effects  Tell him or her if you are allergic to any medicine  Keep a list of the medicines, vitamins, and herbs you take  Include the amounts, and when and why you take them  Bring the list or the pill bottles to follow-up visits  Carry your medicine list with you in case of an emergency  Follow up with your healthcare provider as directed:  Write down your questions so you remember to ask them during your visits  Memory loss and headaches:   · ECT may cause memory loss and confusion  Your confusion may go away in a short time, such as 1 hour after your treatment  You may lose your memory for 1 to 3 weeks, and some memories may be lost forever  · You may also get a headache after an ECT treatment  These headaches usually only last a short time  If you have a headache after ECT, ask your healthcare provider for medicine to make it go away  If more ECT treatments are planned for you, ask the healthcare provider to give you medicine before the treatments to help prevent headaches  · There is a greater chance that you will fall after ECT treatments  Ask someone to help you when you want to stand up or walk  Ask your healthcare provider for more information on how to prevent falls    Contact your healthcare provider if:   · You have severe pain in your back or neck  · You have questions or concerns about your condition or care  Seek care immediately or call 911 if:   · You have a fever  · You have a severe headache that does not get better, even after you take medicine to treat it  · You have a stiff neck or trouble thinking clearly  · You have feelings of guilt or hopelessness, or thoughts of hurting or killing yourself or others  · You have shortness of breath, chest pain, or a fast heartbeat  © 2017 [address] [facility] [place] Information is for End User's use only and may not be sold, redistributed or otherwise used for commercial purposes  All illustrations and images included in CareNotes® are the copyrighted property of [employer]  or Rudy Wilkins  The above information is an  only  It is not intended as medical advice for individual conditions or treatments  Talk to your doctor, nurse or pharmacist before following any medical regimen to see if it is safe and effective for you

## 2020-07-28 NOTE — ANESTHESIA POSTPROCEDURE EVALUATION
Post-Op Assessment Note    CV Status:  Stable  Pain Score: 0    Pain management: adequate     Mental Status:  Alert and awake   Hydration Status:  Stable   PONV Controlled:  None   Airway Patency:  Patent   Post Op Vitals Reviewed: Yes      Staff: CRNA           BP      Temp     Pulse    Resp      SpO2

## 2020-07-28 NOTE — PROCEDURES
ECT Procedure    Patient Name:  Angelina Ramos   Medical Record Number: [id number]   YOB: 1950  Date of Procedure: 7/28/2020    Time out was taken with staff to confirm correct patient and correct procedure to be performed      Diagnosis: depression    Treatment Number: maintenance    ECT Type: Outpatient    Electrode Placement: bilateral    Post Ictal Suppression Index:  na    % Energy Set: 100    Seizure Duration (OBS): 37 sec    Additional Notes: uneventful    Yuki Adams MD

## 2020-07-31 ENCOUNTER — TRANSCRIBE ORDERS (OUTPATIENT)
Dept: ADMINISTRATIVE | Facility: HOSPITAL | Age: 70
End: 2020-07-31

## 2020-08-18 ENCOUNTER — ANESTHESIA EVENT (OUTPATIENT)
Dept: PREOP/PACU | Facility: HOSPITAL | Age: 70
End: 2020-08-18

## 2020-08-19 ENCOUNTER — ANESTHESIA (OUTPATIENT)
Dept: PREOP/PACU | Facility: HOSPITAL | Age: 70
End: 2020-08-19

## 2020-08-19 ENCOUNTER — HOSPITAL ENCOUNTER (OUTPATIENT)
Dept: PREOP/PACU | Facility: HOSPITAL | Age: 70
Setting detail: OUTPATIENT SURGERY
Discharge: HOME | End: 2020-08-19
Payer: MEDICARE

## 2020-08-19 VITALS
DIASTOLIC BLOOD PRESSURE: 75 MMHG | RESPIRATION RATE: 16 BRPM | WEIGHT: 162 LBS | HEIGHT: 72 IN | HEART RATE: 58 BPM | TEMPERATURE: 98 F | SYSTOLIC BLOOD PRESSURE: 148 MMHG | BODY MASS INDEX: 21.94 KG/M2 | OXYGEN SATURATION: 96 %

## 2020-08-19 PROCEDURE — 90870 ELECTROCONVULSIVE THERAPY: CPT

## 2020-08-19 RX ORDER — SUCCINYLCHOLINE/SOD CL,ISO/PF 100 MG/5ML
SYRINGE (ML) INTRAVENOUS AS NEEDED
Status: DISCONTINUED | OUTPATIENT
Start: 2020-08-19 | End: 2020-08-19

## 2020-08-19 RX ORDER — ESMOLOL HYDROCHLORIDE 10 MG/ML
INJECTION INTRAVENOUS AS NEEDED
Status: DISCONTINUED | OUTPATIENT
Start: 2020-08-19 | End: 2020-08-19

## 2020-08-19 RX ORDER — SODIUM CHLORIDE 9 MG/ML
INJECTION, SOLUTION INTRAVENOUS CONTINUOUS PRN
Status: DISCONTINUED | OUTPATIENT
Start: 2020-08-19 | End: 2020-08-19

## 2020-08-19 RX ORDER — SODIUM CHLORIDE 9 MG/ML
50 INJECTION, SOLUTION INTRAVENOUS CONTINUOUS
Status: DISCONTINUED | OUTPATIENT
Start: 2020-08-19 | End: 2020-08-23 | Stop reason: HOSPADM

## 2020-08-19 RX ORDER — GLYCOPYRROLATE 0.2 MG/ML
INJECTION INTRAMUSCULAR; INTRAVENOUS AS NEEDED
Status: DISCONTINUED | OUTPATIENT
Start: 2020-08-19 | End: 2020-08-19

## 2020-08-19 RX ORDER — ETOMIDATE 2 MG/ML
INJECTION INTRAVENOUS AS NEEDED
Status: DISCONTINUED | OUTPATIENT
Start: 2020-08-19 | End: 2020-08-19

## 2020-08-19 RX ADMIN — Medication 100 MG: at 07:10

## 2020-08-19 RX ADMIN — SODIUM CHLORIDE: 0.9 INJECTION, SOLUTION INTRAVENOUS at 07:07

## 2020-08-19 RX ADMIN — GLYCOPYRROLATE 0.2 MG: 0.2 INJECTION, SOLUTION INTRAMUSCULAR; INTRAVENOUS at 07:07

## 2020-08-19 RX ADMIN — ETOMIDATE 20 MG: 2 INJECTION, SOLUTION INTRAVENOUS at 07:09

## 2020-08-19 RX ADMIN — SODIUM CHLORIDE 50 ML/HR: 0.9 INJECTION, SOLUTION INTRAVENOUS at 05:57

## 2020-08-19 RX ADMIN — ESMOLOL HYDROCHLORIDE 50 MG: 10 INJECTION, SOLUTION INTRAVENOUS at 07:12

## 2020-08-19 NOTE — ANESTHESIA PREPROCEDURE EVALUATION
Procedure:  ECT OUTPATIENT    Relevant Problems   CARDIO   (+) Essential hypertension   (+) Other hyperlipidemia      ENDO   (+) Acquired hypothyroidism      GI/HEPATIC   (+) GERD without esophagitis   (+) Other dysphagia      /RENAL   (+) Malignant neoplasm of prostate (HCC)      NEURO/PSYCH   (+) Major depressive disorder, recurrent, severe with psychotic features (HCC)        Physical Exam    Airway    Mallampati score: II  TM Distance: >3 FB  Neck ROM: full     Dental       Cardiovascular  Cardiovascular exam normal    Pulmonary  Pulmonary exam normal     Other Findings        Anesthesia Plan  ASA Score- 2     Anesthesia Type- general with ASA Monitors  Additional Monitors:   Airway Plan:           Plan Factors-Exercise tolerance (METS): >4 METS  Chart reviewed  EKG reviewed  Existing labs reviewed  Patient is not a current smoker  Patient not instructed to abstain from smoking on day of procedure  Patient did not smoke on day of surgery  Induction- intravenous  Postoperative Plan-     Informed Consent- Anesthetic plan and risks discussed with patient  I personally reviewed this patient with the CRNA  Discussed and agreed on the Anesthesia Plan with the CRNA  Michaela Adames             No results found for: HGBA1C    Lab Results   Component Value Date    K 3 9 06/03/2020     06/03/2020    CO2 29 06/03/2020    BUN 11 06/03/2020    CREATININE 0 71 06/03/2020    GLUF 103 (H) 06/03/2020    CALCIUM 9 2 06/03/2020    AST 40 05/15/2020    ALT 31 05/15/2020    ALKPHOS 113 05/15/2020    EGFR 97 06/03/2020       Lab Results   Component Value Date    WBC 4 60 06/03/2020    HGB 12 1 (L) 06/03/2020    HCT 37 0 (L) 06/03/2020    MCV 91 06/03/2020     06/03/2020    Study Result     Sinus rhythm with marked sinus arrhythmia with occasional Premature ventricular complexes  Rightward axis  Borderline ECG     Confirmed by Heladio Solo ([id number]) on 5/6/2020 5:23:59 AM

## 2020-08-19 NOTE — ADDENDUM NOTE
Addendum  created 08/19/20 3557 by Bridgette Arce MD    Attestation recorded in [address] [place] [address], [facility] 97 filed, Order list changed, Order sets accessed

## 2020-08-19 NOTE — PROCEDURES
ECT Procedure    Patient Name:  Selina Vergara       Medical Record Number: [id number]   YOB: 1950  Date of Procedure: 8/19/2020            Time out was taken with staff to confirm correct patient and correct procedure to be performed  Procedure was performed under the supervision of dr Lennon Skiff        ECT Type: Outpatient    Anesthesia: brevital    Electrode Placement: bilateral    % Energy Set: 80%    Seizure Duration (OBS):  25 sec  Seizure Duration (EEG): 44 sec  PISI: 63 9%    Results:Clinical seizure was satisfactory, and patient tolerated ECT well without complications

## 2020-08-19 NOTE — ANESTHESIA POSTPROCEDURE EVALUATION
Post-Op Assessment Note    CV Status:  Stable    Pain management: adequate     Mental Status:  Alert and awake   Hydration Status:  Euvolemic   PONV Controlled:  Controlled   Airway Patency:  Patent      Post Op Vitals Reviewed: Yes      Staff: CRNA         No complications documented      BP      Temp     Pulse    Resp      SpO2

## 2020-08-19 NOTE — DISCHARGE INSTRUCTIONS
Electroconvulsive Therapy   WHAT YOU NEED TO KNOW:   Electroconvulsive therapy (ECT) is a treatment that sends a small electric current to your brain to cause a seizure  The seizure affects the chemicals in your brain, which may make your brain cells work better  ECT is used to treat certain conditions, such as depression, that do not get better after medicines or other therapies have been tried  DISCHARGE INSTRUCTIONS:   Medicines:   · Antidepressants: You may be given this medicine to help decrease or prevent symptoms of depression  · Take your medicine as directed  Contact your healthcare provider if you think your medicine is not helping or if you have side effects  Tell him or her if you are allergic to any medicine  Keep a list of the medicines, vitamins, and herbs you take  Include the amounts, and when and why you take them  Bring the list or the pill bottles to follow-up visits  Carry your medicine list with you in case of an emergency  Follow up with your healthcare provider as directed:  Write down your questions so you remember to ask them during your visits  Memory loss and headaches:   · ECT may cause memory loss and confusion  Your confusion may go away in a short time, such as 1 hour after your treatment  You may lose your memory for 1 to 3 weeks, and some memories may be lost forever  · You may also get a headache after an ECT treatment  These headaches usually only last a short time  If you have a headache after ECT, ask your healthcare provider for medicine to make it go away  If more ECT treatments are planned for you, ask the healthcare provider to give you medicine before the treatments to help prevent headaches  · There is a greater chance that you will fall after ECT treatments  Ask someone to help you when you want to stand up or walk  Ask your healthcare provider for more information on how to prevent falls    Contact your healthcare provider if:   · You have severe pain in your back or neck  · You have questions or concerns about your condition or care  Seek care immediately or call 911 if:   · You have a fever  · You have a severe headache that does not get better, even after you take medicine to treat it  · You have a stiff neck or trouble thinking clearly  · You have feelings of guilt or hopelessness, or thoughts of hurting or killing yourself or others  · You have shortness of breath, chest pain, or a fast heartbeat  © 2017 [employer] [employer]  Information is for End User's use only and may not be sold, redistributed or otherwise used for commercial purposes  All illustrations and images included in CareNotes® are the copyrighted property of [employer] M [employer] Inc  or [employer]y Wilkins  The above information is an  only  It is not intended as medical advice for individual conditions or treatments  Talk to your doctor, nurse or pharmacist before following any medical regimen to see if it is safe and effective for you

## 2020-09-15 ENCOUNTER — ANESTHESIA EVENT (OUTPATIENT)
Dept: PREOP/PACU | Facility: HOSPITAL | Age: 70
End: 2020-09-15

## 2020-09-15 NOTE — ANESTHESIA PREPROCEDURE EVALUATION
Procedure:  ECT OUTPATIENT    Relevant Problems   CARDIO   (+) Essential hypertension   (+) Other hyperlipidemia      ENDO   (+) Acquired hypothyroidism      GI/HEPATIC   (+) GERD without esophagitis   (+) Other dysphagia      /RENAL   (+) Malignant neoplasm of prostate (HCC)      NEURO/PSYCH   (+) Major depressive disorder, recurrent, severe with psychotic features (HCC)        Physical Exam    Airway    Mallampati score: II  TM Distance: >3 FB  Neck ROM: full     Dental       Cardiovascular  Cardiovascular exam normal    Pulmonary  Pulmonary exam normal     Other Findings        Anesthesia Plan  ASA Score- 2     Anesthesia Type- general with ASA Monitors  Additional Monitors:   Airway Plan:           Plan Factors-Exercise tolerance (METS): >4 METS  Chart reviewed  EKG reviewed  Existing labs reviewed  Patient is not a current smoker  Patient not instructed to abstain from smoking on day of procedure  Patient did not smoke on day of surgery  Induction- intravenous  Postoperative Plan-     Informed Consent- Anesthetic plan and risks discussed with patient  I personally reviewed this patient with the CRNA  Discussed and agreed on the Anesthesia Plan with the CRNA  Chaim Henderson             No results found for: HGBA1C    Lab Results   Component Value Date    K 3 9 06/03/2020     06/03/2020    CO2 29 06/03/2020    BUN 11 06/03/2020    CREATININE 0 71 06/03/2020    GLUF 103 (H) 06/03/2020    CALCIUM 9 2 06/03/2020    AST 40 05/15/2020    ALT 31 05/15/2020    ALKPHOS 113 05/15/2020    EGFR 97 06/03/2020       Lab Results   Component Value Date    WBC 4 60 06/03/2020    HGB 12 1 (L) 06/03/2020    HCT 37 0 (L) 06/03/2020    MCV 91 06/03/2020     06/03/2020    Study Result     Sinus rhythm with marked sinus arrhythmia with occasional Premature ventricular complexes  Rightward axis  Borderline ECG     Confirmed by Juan Luis Redman ([id number]) on 5/6/2020 5:23:59 AM

## 2020-09-16 ENCOUNTER — ANESTHESIA (OUTPATIENT)
Dept: PREOP/PACU | Facility: HOSPITAL | Age: 70
End: 2020-09-16

## 2020-09-16 ENCOUNTER — HOSPITAL ENCOUNTER (OUTPATIENT)
Dept: PREOP/PACU | Facility: HOSPITAL | Age: 70
Setting detail: OUTPATIENT SURGERY
Discharge: HOME | End: 2020-09-16
Payer: MEDICARE

## 2020-09-16 VITALS
RESPIRATION RATE: 20 BRPM | WEIGHT: 162 LBS | HEIGHT: 72 IN | TEMPERATURE: 98.1 F | HEART RATE: 98 BPM | SYSTOLIC BLOOD PRESSURE: 155 MMHG | OXYGEN SATURATION: 94 % | DIASTOLIC BLOOD PRESSURE: 85 MMHG | BODY MASS INDEX: 21.94 KG/M2

## 2020-09-16 VITALS — HEART RATE: 72 BPM

## 2020-09-16 PROCEDURE — 90870 ELECTROCONVULSIVE THERAPY: CPT

## 2020-09-16 RX ORDER — ESMOLOL HYDROCHLORIDE 10 MG/ML
INJECTION INTRAVENOUS AS NEEDED
Status: DISCONTINUED | OUTPATIENT
Start: 2020-09-16 | End: 2020-09-16

## 2020-09-16 RX ORDER — ETOMIDATE 2 MG/ML
INJECTION INTRAVENOUS AS NEEDED
Status: DISCONTINUED | OUTPATIENT
Start: 2020-09-16 | End: 2020-09-16

## 2020-09-16 RX ORDER — SUCCINYLCHOLINE/SOD CL,ISO/PF 100 MG/5ML
SYRINGE (ML) INTRAVENOUS AS NEEDED
Status: DISCONTINUED | OUTPATIENT
Start: 2020-09-16 | End: 2020-09-16

## 2020-09-16 RX ORDER — SODIUM CHLORIDE 9 MG/ML
50 INJECTION, SOLUTION INTRAVENOUS CONTINUOUS
Status: DISCONTINUED | OUTPATIENT
Start: 2020-09-16 | End: 2020-09-20 | Stop reason: HOSPADM

## 2020-09-16 RX ORDER — GLYCOPYRROLATE 0.2 MG/ML
INJECTION INTRAMUSCULAR; INTRAVENOUS AS NEEDED
Status: DISCONTINUED | OUTPATIENT
Start: 2020-09-16 | End: 2020-09-16

## 2020-09-16 RX ADMIN — SODIUM CHLORIDE 50 ML/HR: 0.9 INJECTION, SOLUTION INTRAVENOUS at 05:51

## 2020-09-16 RX ADMIN — ESMOLOL HYDROCHLORIDE 50 MG: 10 INJECTION, SOLUTION INTRAVENOUS at 06:36

## 2020-09-16 RX ADMIN — Medication 100 MG: at 06:32

## 2020-09-16 RX ADMIN — ETOMIDATE 20 MG: 2 INJECTION, SOLUTION INTRAVENOUS at 06:32

## 2020-09-16 RX ADMIN — GLYCOPYRROLATE 0.2 MG: 0.2 INJECTION, SOLUTION INTRAMUSCULAR; INTRAVENOUS at 06:26

## 2020-09-16 NOTE — ANESTHESIA POSTPROCEDURE EVALUATION
Post-Op Assessment Note    CV Status:  Stable  Pain Score: 0    Pain management: adequate     Mental Status:  Alert and awake   Hydration Status:  Euvolemic   PONV Controlled:  Controlled   Airway Patency:  Patent      Post Op Vitals Reviewed: Yes      Staff: Anesthesiologist         No complications documented      BP   184/85   Temp      Pulse  72   Resp   12   SpO2   99%

## 2020-09-16 NOTE — PROCEDURES
ECT Procedure    Patient Name:  See Whiting       Medical Record Number: [id number]   YOB: 1950  Date of Procedure: 9/16/2020            Time out was taken with staff to confirm correct patient and correct procedure to be performed  Procedure was performed by me under the direct supervision of Dr Camilla Lora       Session Number:     ECT Type: Outpatient    Anesthesia: brevital    Electrode Placement: bilateral    % Energy Set: 80%      Seizure Duration (OBS): not detected   Seizure Duration (EEG): 41 sec    Results:Clinical seizure was satisfactory, Patient tolerated ECT well    Additional Notes: no complications

## 2020-10-13 ENCOUNTER — ANESTHESIA EVENT (OUTPATIENT)
Dept: PREOP/PACU | Facility: HOSPITAL | Age: 70
End: 2020-10-13

## 2020-10-14 ENCOUNTER — ANESTHESIA (OUTPATIENT)
Dept: PREOP/PACU | Facility: HOSPITAL | Age: 70
End: 2020-10-14

## 2020-10-14 ENCOUNTER — HOSPITAL ENCOUNTER (OUTPATIENT)
Dept: PREOP/PACU | Facility: HOSPITAL | Age: 70
Setting detail: OUTPATIENT SURGERY
Discharge: HOME | End: 2020-10-14
Payer: MEDICARE

## 2020-10-14 VITALS
SYSTOLIC BLOOD PRESSURE: 142 MMHG | TEMPERATURE: 97.8 F | RESPIRATION RATE: 18 BRPM | WEIGHT: 162 LBS | HEIGHT: 72 IN | OXYGEN SATURATION: 96 % | BODY MASS INDEX: 21.94 KG/M2 | DIASTOLIC BLOOD PRESSURE: 88 MMHG | HEART RATE: 88 BPM

## 2020-10-14 VITALS — HEART RATE: 89 BPM

## 2020-10-14 PROCEDURE — 90870 ELECTROCONVULSIVE THERAPY: CPT

## 2020-10-14 RX ORDER — SUCCINYLCHOLINE/SOD CL,ISO/PF 100 MG/5ML
SYRINGE (ML) INTRAVENOUS AS NEEDED
Status: DISCONTINUED | OUTPATIENT
Start: 2020-10-14 | End: 2020-10-14

## 2020-10-14 RX ORDER — SODIUM CHLORIDE 9 MG/ML
INJECTION, SOLUTION INTRAVENOUS CONTINUOUS PRN
Status: DISCONTINUED | OUTPATIENT
Start: 2020-10-14 | End: 2020-10-14

## 2020-10-14 RX ORDER — SODIUM CHLORIDE 9 MG/ML
125 INJECTION, SOLUTION INTRAVENOUS CONTINUOUS
Status: DISCONTINUED | OUTPATIENT
Start: 2020-10-14 | End: 2020-10-18 | Stop reason: HOSPADM

## 2020-10-14 RX ORDER — GLYCOPYRROLATE 0.2 MG/ML
INJECTION INTRAMUSCULAR; INTRAVENOUS AS NEEDED
Status: DISCONTINUED | OUTPATIENT
Start: 2020-10-14 | End: 2020-10-14

## 2020-10-14 RX ORDER — ETOMIDATE 2 MG/ML
INJECTION INTRAVENOUS AS NEEDED
Status: DISCONTINUED | OUTPATIENT
Start: 2020-10-14 | End: 2020-10-14

## 2020-10-14 RX ORDER — ESMOLOL HYDROCHLORIDE 10 MG/ML
INJECTION INTRAVENOUS AS NEEDED
Status: DISCONTINUED | OUTPATIENT
Start: 2020-10-14 | End: 2020-10-14

## 2020-10-14 RX ADMIN — SODIUM CHLORIDE: 0.9 INJECTION, SOLUTION INTRAVENOUS at 06:14

## 2020-10-14 RX ADMIN — ETOMIDATE 20 MG: 2 INJECTION, SOLUTION INTRAVENOUS at 06:45

## 2020-10-14 RX ADMIN — ESMOLOL HYDROCHLORIDE 50 MG: 10 INJECTION, SOLUTION INTRAVENOUS at 06:44

## 2020-10-14 RX ADMIN — GLYCOPYRROLATE 0.2 MG: 0.2 INJECTION, SOLUTION INTRAMUSCULAR; INTRAVENOUS at 06:42

## 2020-10-14 RX ADMIN — Medication 100 MG: at 06:46

## 2020-10-14 RX ADMIN — SODIUM CHLORIDE 125 ML/HR: 0.9 INJECTION, SOLUTION INTRAVENOUS at 05:47

## 2020-11-17 ENCOUNTER — ANESTHESIA EVENT (OUTPATIENT)
Dept: PREOP/PACU | Facility: HOSPITAL | Age: 70
End: 2020-11-17

## 2020-11-18 ENCOUNTER — HOSPITAL ENCOUNTER (OUTPATIENT)
Dept: PREOP/PACU | Facility: HOSPITAL | Age: 70
Setting detail: OUTPATIENT SURGERY
Discharge: HOME | End: 2020-11-18
Payer: MEDICARE

## 2020-11-18 ENCOUNTER — ANESTHESIA (OUTPATIENT)
Dept: PREOP/PACU | Facility: HOSPITAL | Age: 70
End: 2020-11-18

## 2020-11-18 VITALS
WEIGHT: 162 LBS | RESPIRATION RATE: 20 BRPM | HEIGHT: 72 IN | HEART RATE: 92 BPM | OXYGEN SATURATION: 94 % | SYSTOLIC BLOOD PRESSURE: 127 MMHG | BODY MASS INDEX: 21.94 KG/M2 | TEMPERATURE: 97.7 F | DIASTOLIC BLOOD PRESSURE: 79 MMHG

## 2020-11-18 VITALS — HEART RATE: 92 BPM

## 2020-11-18 PROCEDURE — 90870 ELECTROCONVULSIVE THERAPY: CPT

## 2020-11-18 RX ORDER — ETOMIDATE 2 MG/ML
INJECTION INTRAVENOUS AS NEEDED
Status: DISCONTINUED | OUTPATIENT
Start: 2020-11-18 | End: 2020-11-18

## 2020-11-18 RX ORDER — SODIUM CHLORIDE 9 MG/ML
125 INJECTION, SOLUTION INTRAVENOUS CONTINUOUS
Status: DISCONTINUED | OUTPATIENT
Start: 2020-11-18 | End: 2020-11-22 | Stop reason: HOSPADM

## 2020-11-18 RX ORDER — SUCCINYLCHOLINE/SOD CL,ISO/PF 100 MG/5ML
SYRINGE (ML) INTRAVENOUS AS NEEDED
Status: DISCONTINUED | OUTPATIENT
Start: 2020-11-18 | End: 2020-11-18

## 2020-11-18 RX ORDER — GLYCOPYRROLATE 0.2 MG/ML
INJECTION INTRAMUSCULAR; INTRAVENOUS AS NEEDED
Status: DISCONTINUED | OUTPATIENT
Start: 2020-11-18 | End: 2020-11-18

## 2020-11-18 RX ORDER — ESMOLOL HYDROCHLORIDE 10 MG/ML
INJECTION INTRAVENOUS AS NEEDED
Status: DISCONTINUED | OUTPATIENT
Start: 2020-11-18 | End: 2020-11-18

## 2020-11-18 RX ADMIN — SODIUM CHLORIDE 125 ML/HR: 0.9 INJECTION, SOLUTION INTRAVENOUS at 05:31

## 2020-11-18 RX ADMIN — ESMOLOL HYDROCHLORIDE 50 MG: 10 INJECTION, SOLUTION INTRAVENOUS at 07:13

## 2020-11-18 RX ADMIN — ETOMIDATE 20 MG: 2 INJECTION, SOLUTION INTRAVENOUS at 07:10

## 2020-11-18 RX ADMIN — GLYCOPYRROLATE 0.2 MG: 0.2 INJECTION, SOLUTION INTRAMUSCULAR; INTRAVENOUS at 07:08

## 2020-11-18 RX ADMIN — Medication 100 MG: at 07:10

## 2020-12-15 ENCOUNTER — ANESTHESIA EVENT (OUTPATIENT)
Dept: PREOP/PACU | Facility: HOSPITAL | Age: 70
End: 2020-12-15

## 2020-12-16 ENCOUNTER — HOSPITAL ENCOUNTER (OUTPATIENT)
Dept: PREOP/PACU | Facility: HOSPITAL | Age: 70
Setting detail: OUTPATIENT SURGERY
Discharge: HOME | End: 2020-12-16
Admitting: ANESTHESIOLOGY
Payer: MEDICARE

## 2020-12-16 ENCOUNTER — ANESTHESIA (OUTPATIENT)
Dept: PREOP/PACU | Facility: HOSPITAL | Age: 70
End: 2020-12-16

## 2020-12-16 VITALS
TEMPERATURE: 97.3 F | OXYGEN SATURATION: 94 % | DIASTOLIC BLOOD PRESSURE: 98 MMHG | SYSTOLIC BLOOD PRESSURE: 164 MMHG | WEIGHT: 162 LBS | HEIGHT: 72 IN | HEART RATE: 95 BPM | RESPIRATION RATE: 20 BRPM | BODY MASS INDEX: 21.94 KG/M2

## 2020-12-16 VITALS — HEART RATE: 90 BPM

## 2020-12-16 PROCEDURE — 90870 ELECTROCONVULSIVE THERAPY: CPT

## 2020-12-16 RX ORDER — SUCCINYLCHOLINE/SOD CL,ISO/PF 100 MG/5ML
SYRINGE (ML) INTRAVENOUS AS NEEDED
Status: DISCONTINUED | OUTPATIENT
Start: 2020-12-16 | End: 2020-12-16

## 2020-12-16 RX ORDER — SODIUM CHLORIDE 9 MG/ML
125 INJECTION, SOLUTION INTRAVENOUS CONTINUOUS
Status: DISCONTINUED | OUTPATIENT
Start: 2020-12-16 | End: 2020-12-20 | Stop reason: HOSPADM

## 2020-12-16 RX ORDER — SODIUM CHLORIDE 9 MG/ML
75 INJECTION, SOLUTION INTRAVENOUS CONTINUOUS
Status: CANCELLED | OUTPATIENT
Start: 2020-12-16

## 2020-12-16 RX ORDER — ESMOLOL HYDROCHLORIDE 10 MG/ML
INJECTION INTRAVENOUS AS NEEDED
Status: DISCONTINUED | OUTPATIENT
Start: 2020-12-16 | End: 2020-12-16

## 2020-12-16 RX ORDER — GLYCOPYRROLATE 0.2 MG/ML
INJECTION INTRAMUSCULAR; INTRAVENOUS AS NEEDED
Status: DISCONTINUED | OUTPATIENT
Start: 2020-12-16 | End: 2020-12-16

## 2020-12-16 RX ORDER — ETOMIDATE 2 MG/ML
INJECTION INTRAVENOUS AS NEEDED
Status: DISCONTINUED | OUTPATIENT
Start: 2020-12-16 | End: 2020-12-16

## 2020-12-16 RX ORDER — SODIUM CHLORIDE 9 MG/ML
INJECTION, SOLUTION INTRAVENOUS CONTINUOUS PRN
Status: DISCONTINUED | OUTPATIENT
Start: 2020-12-16 | End: 2020-12-16

## 2020-12-16 RX ADMIN — SODIUM CHLORIDE 125 ML/HR: 0.9 INJECTION, SOLUTION INTRAVENOUS at 05:46

## 2020-12-16 RX ADMIN — GLYCOPYRROLATE 0.2 MG: 0.2 INJECTION, SOLUTION INTRAMUSCULAR; INTRAVENOUS at 06:33

## 2020-12-16 RX ADMIN — ETOMIDATE 100 MG: 2 INJECTION, SOLUTION INTRAVENOUS at 06:37

## 2020-12-16 RX ADMIN — Medication 100 MG: at 06:37

## 2020-12-16 RX ADMIN — SODIUM CHLORIDE: 0.9 INJECTION, SOLUTION INTRAVENOUS at 06:20

## 2020-12-16 RX ADMIN — ESMOLOL HYDROCHLORIDE 50 MG: 10 INJECTION, SOLUTION INTRAVENOUS at 06:44

## 2021-02-24 ENCOUNTER — TELEPHONE (OUTPATIENT)
Dept: PSYCHIATRY | Facility: CLINIC | Age: 71
End: 2021-02-24

## 2021-02-24 NOTE — TELEPHONE ENCOUNTER
Behavorial Health Outpatient Intake Questions    Referred by:    Please advised interviewee that they need to answer all questions truthfully to allow for best care and any misrepresentations of information may affect their ability to be seen at this clinic   => Was this discussed? Yes     [facility] -     Presenting Problem (in patient's words): have to stay with [facility] because of the ECT treatment  Major depression recurent  with Psychosis  Are there any developmental disabilities? ? If yes, can they speak to you on the phone? If they are too limited to speak to you on phone, refer out No    Are you taking any psychiatric medications? Yes    => If yes, who prescribes? Dr Radha Dias If yes, are they injectable medications? Does the patient have a language barrier or hearing impairment? No    Have you been treated at [facility] by a therapist or a doctor in the past? If yes, who? Yes    Has the patient been hospitalized for mental health? Yes   If yes, how long ago was last hospitalization and where was it? 04/2020 for 2 weeks & 05/2020 for a month    Do you actively use alcohol or marijuana or illegal substances? If yes, what and how much - refer out to Drug and alcohol treatment if use is excessive or daily use of illegal substances No concerns of substance abuse are reported  Do you have a community treatment team or ? No    Legal History-     Does the patient have any history of arrests, nursing home/halfway time, or DUIs? No  If Yes-  1) What types of charges? 2) When were they last incarcerated? 3) Are they currently on parole or probation? Minor Child-    Who has custody of the child? Is there a custody agreement? If there is a custody agreement remind parent that they must bring a copy to the first appt or they will not be seen       Intake Team, please check with provider before scheduling if flags come up such as:  - complex case  - legal history (other than DUI)  - communication barrier concerns are present  - if, in your judgment, this needs further review    ACCEPTED as a patient  YES  Appointment Date: 03/22/21 @ 1 PM   Dr Linda Carmichael? NO    Name of Insurance Co:medicare a&b  Insurance ID#  Insurance Phone #  If ins is primary or secondary  If patient is a minor, parents information such as Name, D  O B of guarantor

## 2021-03-22 ENCOUNTER — OFFICE VISIT (OUTPATIENT)
Dept: PSYCHIATRY | Facility: CLINIC | Age: 71
End: 2021-03-22
Payer: MEDICARE

## 2021-03-22 DIAGNOSIS — F33.3 MAJOR DEPRESSIVE DISORDER, RECURRENT, SEVERE WITH PSYCHOTIC FEATURES (HCC): Primary | Chronic | ICD-10-CM

## 2021-03-22 PROCEDURE — 90792 PSYCH DIAG EVAL W/MED SRVCS: CPT | Performed by: PSYCHIATRY & NEUROLOGY

## 2021-03-22 RX ORDER — FLUOXETINE HYDROCHLORIDE 20 MG/1
20 CAPSULE ORAL DAILY
COMMUNITY
Start: 2021-02-08

## 2021-03-22 RX ORDER — FLUOXETINE HYDROCHLORIDE 40 MG/1
40 CAPSULE ORAL EVERY EVENING
COMMUNITY
Start: 2021-01-02

## 2021-03-22 RX ORDER — SULFASALAZINE 500 MG/1
TABLET ORAL
COMMUNITY
Start: 2021-03-16

## 2021-03-22 RX ORDER — POTASSIUM CHLORIDE 1500 MG/1
1 TABLET, FILM COATED, EXTENDED RELEASE ORAL DAILY
COMMUNITY
Start: 2021-03-17

## 2021-03-22 RX ORDER — IMMUNE GLOBULIN (HUMAN) 10 G/100ML
INJECTION INTRAVENOUS; SUBCUTANEOUS
COMMUNITY
Start: 2021-03-02

## 2021-03-22 RX ORDER — CARVEDILOL 6.25 MG/1
6.25 TABLET ORAL 2 TIMES DAILY
COMMUNITY
Start: 2021-02-16

## 2021-03-22 NOTE — PSYCH
57 Deep[address] Arevalo Chbil    Name and Date of Birth:  Maria T Ontiveros y o  1950 MRN: [id number]    Date of Visit: March 22, 2021    Source of Information:Patient himself who seems to be good historian  His medical records in the [facility] was also reviewed  Chief Complaint:  Patient seeing me for one time consultation for "I want to get back on ECT maintenance treatment"  HPI: this is a 62-year-old  male, , has 4 grownup children, 2 step children and 2 biological, currently lives with his wife and 1 of the children in [place], [place]  The patient is retired and is on social security  The patient reports he had been in mental health treatment 1st time in [address] [place] [address] after being hospitalized in a psychiatric inpatient unit for suicide attempt  He reports he did well till last year  He was hospitalized couple of times around last summer in a psychiatric inpatient unit at [facility] for depression and suicide attempt  During the 2nd admission after he did not responded well on the medication for depression, was started on ECT treatment  He had total of 10 inpatient ECT and then once a month outpatient ECT for maintenance till December last year  He had total of 18 ECT  The patient reports he had been also following with psychiatrist Dr Denisse Russ and  at [facility] post discharge  The patient was diagnosed with major depressive disorder, recurrent with psychotic feature during his last admission last summer  He currently takes olanzapine 10 mg at bedtime, Prozac 60 mg daily  The patient reported that he got connected with us to see if he needs to continue with ECT treatment  The patient said he did not realized that the [facility] is part of [facility]  now due to which he had scheduled this appointment with me   The patient thought given he had ECT treatment at [facility] he needed to see a Socorro Mitchell's psychiatrist to get referral for ECT  The patient described his mood as so-so  Rates depression currently at 5 on a scale of 0-10 with 10 being the worst  He reports he has been doing very well since being discharged from the hospital last year  He reports his depression usually comes from underlying medical issues specially chronic back pain  He also reports being stressed about financial issues  The patient though denies any hopelessness or suicidal ideation nowadays  Reports history of 2 suicide attempt as mentioned above  Both were by cutting wrist superficially  Last admission last year was due to cutting and did not required any stitches  The patient reports his sleep has been good nowadays  Reports sleeping total of 8-10 hours at night  Reports appetite has been poor  The patient reports energy level has been low and attributes that to his multiple medical issues including chronic demyelinating polyneuropathy  The patient follows with neurologist regularly for it  He does not endorse anhedonia  Reports enjoying watching TV or playing car racing game once a month  The patient as mentioned has a history of major depressive episodes in the past   The patient also reports he worries nowadays due to financial concerns  Rates his anxiety as 5 on a scale of 0-10 with 10 being the worst   He denies any history of any social anxiety or any panic attacks  The patient denies any history of auditory or visual hallucination  Does not endorse any paranoia, delusional grandiose ideation  Review of his in-patient psychiatric notes from last year indicates that the patient were exhibiting some paranoia and obsessive thoughts at that time  Probably there was a reason he was diagnosed with major depression with psychotic feature  Though denies any other history of any paranoia or delusions    The patient denies being diagnosed with obsessive-compulsive disorder in the past   He also currently denies any symptoms for OCD  Denies any history of manic or hypomanic episode  Denies any history of any eating disorder  Reports compliant with the current medication and denies any side effect  Review Of Systems:    Constitutional feeling tired and low energy   ENT negative   Cardiovascular negative   Respiratory negative   Gastrointestinal negative   Genitourinary negative   Musculoskeletal back pain   Integumentary negative   Neurological neuropathic pain and slow gait   Endocrine negative   Other Symptoms none       Past Psychiatric History:   Check HPI for details  Reports past medication trial includes Prozac, Zyprexa, Celexa, Xanax and Ambien  Does not recall other psychiatric medication  Denies any history of any physical attempt or violence  Traumatic History:     Abuse: none  Other Traumatic Events: none       Substance Abuse History:  The patient denies any alcohol use nowadays but reports in the past had history of alcohol abuse  Reports smoking marijuana once a month  Has been smoking marijuana since he was 14year-old  He reports he has a history of opiate abuse for 6 years  Last use was 1 year back  He also reported he had tried all other substances in the past but has not used in many years  It also includes history of abusing Xanax  Denies any history of any rehab admission  Longest clean time: not applicable  History of Inpatient/Outpatient rehabilitation program: no  Smoking history: denies current use  Use of caffeine: 1 cups of coffee per week    Family Psychiatric/Substance Use History:     Psychiatric Illness:  patient is adopted  Substance Abuse:  patient is adopted  Suicide Attempts:  patient is adopted    Social History:  Born & Raised in :  [place], was adopted at birth    Childhood Experiences:   normal  Education: bachelor's degree  Learning Disabilities: none  Marital History:   Children: 4 adult children  Living Arrangement: lives in home with wife and daughter  Occupational History: retired  Functioning Relationships: good support system  Legal History: none   History: None      Past Medical History:    Past Medical History:   Diagnosis Date    [facility]     Crohn [facility] ([facility] )     Degenerative disc disease, lumbar     Disease of thyroid gland     GERD (gastroesophageal reflux disease)     Myocardial infarction ([facility] )     Psychiatric disorder      No past medical history pertinent negatives    Past Surgical History:   Procedure Laterality Date    LAPAROSCOPIC APPENDECTOMY      PEG TUBE PLACEMENT      PROSTATECTOMY       No Known Allergies      Current Medications:      Current Outpatient Medications:     atorvastatin (LIPITOR) 20 mg tablet, TAKE 1 TABLET BY MOUTH EVERY DAY WITH DINNER, Disp: 30 tablet, Rfl: 0    carvedilol (COREG) 6 25 mg tablet, Take 6 25 mg by mouth 2 (two) times a day, Disp: , Rfl:     [employer]S B-12 500 MCG tablet, TAKE 1 TABLET BY MOUTH DAILY, Disp: 30 tablet, Rfl: 0    FLUoxetine (PROzac) 20 mg capsule, Take 20 mg by mouth daily, Disp: , Rfl:     FLUoxetine (PROzac) 40 MG capsule, Take 40 mg by mouth every evening, Disp: , Rfl:     gabapentin (NEURONTIN) 100 mg capsule, TAKE 2 CAPSULES BY MOUTH 3 TIMES A DAY, Disp: 90 capsule, Rfl: 1    levothyroxine 88 mcg tablet, Take 1 tablet (88 mcg total) by mouth daily in the early morning, Disp: 30 tablet, Rfl: 0    OLANZapine (ZyPREXA) 10 mg tablet, Take 1 tablet (10 mg total) by mouth daily at bedtime, Disp: 30 tablet, Rfl: 0    pantoprazole (PROTONIX) 40 mg tablet, Take 1 tablet (40 mg total) by mouth daily in the early morning, Disp: 30 tablet, Rfl: 0    Potassium Chloride ER 20 MEQ TBCR, Take 1 tablet by mouth daily With food, Disp: , Rfl:     sulfaSALAzine (AZULFIDINE) 500 mg tablet, , Disp: , Rfl:     bacitracin topical ointment 500 units/g topical ointment, Apply 1 large application topically 2 (two) times a day 1 large application topically twice a day to PEG tube area, Disp: 15 g, Rfl: 0    carvedilol (COREG) 3 125 mg tablet, Take 1 tablet (3 125 mg total) by mouth 2 (two) times a day with meals, Disp: 30 tablet, Rfl: 0    FLUoxetine (PROzac) 10 mg capsule, Take 3 capsules (30 mg total) by mouth daily (Patient not taking: Reported on 3/22/2021), Disp: 90 capsule, Rfl: 0    hydrocortisone (ANUSOL-HC) 25 mg suppository, Insert 1 suppository (25 mg total) into the rectum 2 (two) times a day, Disp: 12 suppository, Rfl: 0    hydrocortisone 1 % cream, Apply topically 3 (three) times a day, Disp: 30 g, Rfl: 0    immune globulin, human 20 GM/200ML, , Disp: , Rfl:     saccharomyces boulardii (FLORASTOR) 250 mg capsule, Take 1 capsule (250 mg total) by mouth 2 (two) times a day, Disp: 60 capsule, Rfl: 0       OBJECTIVE:    Vital signs in last 24 hours: There were no vitals filed for this visit      Mental Status Evaluation:    Appearance age appropriate, casually dressed   Behavior cooperative, calm   Speech normal rate, normal volume, normal pitch   Mood Mildly depressed occasionally can increase to moderate   Affect normal range and intensity, appropriate   Thought Processes organized, goal directed   Associations intact associations   Thought Content no overt delusions   Perceptual Disturbances: no auditory hallucinations, no visual hallucinations   Abnormal Thoughts  Risk Potential Suicidal ideation - None  Homicidal ideation - None  Potential for aggression - No   Orientation oriented to person, place, time/date and situation   Memory recent and remote memory grossly intact   Consciousness alert and awake   Attention Span Concentration Span attention span and concentration are age appropriate   Intellect appears to be of average intelligence   Insight intact   Judgement intact   Muscle Strength and  Gait uses cane   Motor Activity no abnormal movements   Language no difficulty naming common objects, no difficulty repeating a phrase, no difficulty writing a sentence   Fund of Knowledge adequate knowledge of current events  adequate fund of knowledge regarding past history  adequate fund of knowledge regarding vocabulary    Pain moderate   Pain Scale 5       Laboratory Results:   Most Recent Labs:   Lab Results   Component Value Date    WBC 4 60 06/03/2020    RBC 4 07 (L) 06/03/2020    HGB 12 1 (L) 06/03/2020    HCT 37 0 (L) 06/03/2020     06/03/2020    RDW 15 1 06/03/2020    NEUTROABS 3 08 06/03/2020    K 3 9 06/03/2020     06/03/2020    CO2 29 06/03/2020    BUN 11 06/03/2020    CREATININE 0 71 06/03/2020    CALCIUM 9 2 06/03/2020    AST 40 05/15/2020    ALT 31 05/15/2020    ALKPHOS 113 05/15/2020    NYR4CGCFFQNQ 11 017 (H) 05/05/2020       Assessment/Plan:  From evaluation of the patient today and review of his past psychiatric history, I will concur with the diagnosis of major depressive disorder recurrent currently mild to moderate, with psychotic feature  The patient currently does not endorse any psychotic symptoms  His depression also seems to be much better  Denies any SI or HI  The patient is seeing me today to get assessed if he needs to have follow-up ECT  The patient already had18 ECT treatment till end of last year and had been doing well  Patient has multiple medical issues including chronic back pain and CIDP  Currently the patient rates his depression as 5 and denies any SI or HI  Reports he had some memory loss post ECT treatment  Given the patient had undergone 18 ECT treatment till end of last year and currently doing better, I feel due to his multiple medical issue at this time the benefit is not as much compared to the risk from ECT and recommended to continue with psychiatric medication at this time with no change    The patient though already follows with a psychiatrist at  and was advised that in case he feels he wants to still undergo ECT, he can talk to his current psychiatrist who can refer him for further ECT treatment  The patient verbalized understanding the plans and agrees with it  He also was advised in case his depression worsens or he has any suicidal thoughts, he should call crisis or 911 or visit nearby ER  Patient agrees with the plan  He was also again educated about his current psychiatric medication in detail including benefit, risk, side effects, dosage, frequency and contraindications  The patient verbalized understanding it  Diagnoses and all orders for this visit:    Major depressive disorder, recurrent, severe with psychotic features ([facility] )    Other orders  -     carvedilol (COREG) 6 25 mg tablet; Take 6 25 mg by mouth 2 (two) times a day  -     FLUoxetine (PROzac) 20 mg capsule; Take 20 mg by mouth daily  -     FLUoxetine (PROzac) 40 MG capsule; Take 40 mg by mouth every evening  -     Potassium Chloride ER 20 MEQ TBCR; Take 1 tablet by mouth daily With food  -     immune globulin, human 20 GM/200ML  -     sulfaSALAzine (AZULFIDINE) 500 mg tablet          Treatment Recommendations:    Check Assessment/Plan section for details  Risks/Benefits/Precautions:      Risks, Benefits And Possible Side Effects Of Medications:    Risks, benefits, and possible side effects of medications explained to Brock Weller and he verbalizes understanding and agreement for treatment      Controlled Medication Discussion:     Not applicable    Treatment Plan;    Completed and signed during the session: One time consultation    Sancho Candelario MD 03/22/21

## 2021-03-30 DIAGNOSIS — Z23 ENCOUNTER FOR IMMUNIZATION: ICD-10-CM

## 2022-10-04 ENCOUNTER — TELEMEDICINE (OUTPATIENT)
Dept: PSYCHIATRY | Facility: CLINIC | Age: 72
End: 2022-10-04
Payer: MEDICARE

## 2022-10-04 DIAGNOSIS — F41.1 GENERALIZED ANXIETY DISORDER: ICD-10-CM

## 2022-10-04 DIAGNOSIS — F33.1 MAJOR DEPRESSIVE DISORDER, RECURRENT, MODERATE (HCC): Primary | ICD-10-CM

## 2022-10-04 PROCEDURE — 99213 OFFICE O/P EST LOW 20 MIN: CPT | Performed by: PSYCHIATRY & NEUROLOGY

## 2022-10-04 RX ORDER — ARIPIPRAZOLE 5 MG/1
5 TABLET ORAL DAILY
Qty: 30 TABLET | Refills: 0 | Status: SHIPPED | OUTPATIENT
Start: 2022-10-04

## 2022-10-04 RX ORDER — ARIPIPRAZOLE 5 MG/1
TABLET ORAL
COMMUNITY
Start: 2022-07-27 | End: 2022-10-04 | Stop reason: SDUPTHER

## 2022-10-04 RX ORDER — ARIPIPRAZOLE 2 MG/1
2 TABLET ORAL DAILY
Qty: 30 TABLET | Refills: 1 | Status: SHIPPED | OUTPATIENT
Start: 2022-10-04

## 2022-10-04 RX ORDER — BUPROPION HYDROCHLORIDE 150 MG/1
TABLET ORAL
COMMUNITY
Start: 2022-07-27 | End: 2022-10-07 | Stop reason: SDUPTHER

## 2022-10-04 RX ORDER — BUPROPION HYDROCHLORIDE 450 MG/1
450 TABLET, FILM COATED, EXTENDED RELEASE ORAL DAILY
Qty: 30 TABLET | Refills: 1 | Status: SHIPPED | OUTPATIENT
Start: 2022-10-04 | End: 2022-10-07

## 2022-10-04 RX ORDER — BUPROPION HYDROCHLORIDE 450 MG/1
1 TABLET, FILM COATED, EXTENDED RELEASE ORAL DAILY
COMMUNITY
End: 2022-10-04 | Stop reason: SDUPTHER

## 2022-10-04 RX ORDER — ARIPIPRAZOLE 2 MG/1
TABLET ORAL
COMMUNITY
Start: 2022-08-22 | End: 2022-10-04 | Stop reason: SDUPTHER

## 2022-10-04 NOTE — PSYCH
Virtual Regular Visit    Verification of patient location:    Patient is located in the following state in which I hold an active license [place]      Assessment/Plan:  Continue current meds  Pt has enough prozac supply    Problem List Items Addressed This Visit    None     Visit Diagnoses     Major depressive disorder, recurrent, moderate (HCC)    -  Primary    Relevant Medications    buPROPion (WELLBUTRIN XL) 150 mg 24 hr tablet    ARIPiprazole (ABILIFY) 2 mg tablet    ARIPiprazole (ABILIFY) 5 mg tablet    buPROPion (FORFIVO XL) 450 MG 24 hr tablet    Generalized anxiety disorder        Relevant Medications    buPROPion (WELLBUTRIN XL) 150 mg 24 hr tablet    ARIPiprazole (ABILIFY) 2 mg tablet    ARIPiprazole (ABILIFY) 5 mg tablet    buPROPion (FORFIVO XL) 450 MG 24 hr tablet          Goals addressed in session: Goal 1          Reason for visit is   Chief Complaint   Patient presents with    Medication Management        Encounter provider Flako Balderrama MD    Provider located at [address] [address]  [phone]      Recent Visits  No visits were found meeting these conditions  Showing recent visits within past 7 days and meeting all other requirements  Today's Visits  Date Type Provider Dept   10/04/22 Telemedicine Flako Balderrama, [address] [place] [address] today's visits and meeting all other requirements  Future Appointments  No visits were found meeting these conditions  Showing future appointments within next 150 days and meeting all other requirements       The patient was identified by name and date of birth  Prabhu Sargentaldo was informed that this is a telemedicine visit and that the [facility] and patient was informed this is a secure, HIPAA-complaint platform  He agrees to proceed     My office door was closed  No one else was in the room    He acknowledged consent and understanding of privacy and security of the video platform  The patient has agreed to participate and understands they can discontinue the visit at any time  Patient is aware this is a billable service  Subjective  Nichole Hsu is a 73 y o  male with depression, anxiety presents for regular f/u     He ran out of abilify 2 days ago; continued other meds  Denies SE  Mood - same - depressed, but stable; lwo energy, limited functioning  Anxiety - " same"  Medically - chronic issues      HPI     Past Medical History:   Diagnosis Date    [facility]inating [facility]     [facility]     Degenerative disc disease, lumbar     Disease of thyroid gland     GERD (gastroesophageal reflux disease)     Myocardial infarction ([facility] )     Psychiatric disorder        Past Surgical History:   Procedure Laterality Date    LAPAROSCOPIC APPENDECTOMY      PEG TUBE PLACEMENT      PROSTATECTOMY         Current Outpatient Medications   Medication Sig Dispense Refill    ARIPiprazole (ABILIFY) 2 mg tablet Take 1 tablet (2 mg total) by mouth daily With 5 mg 30 tablet 1    ARIPiprazole (ABILIFY) 5 mg tablet Take 1 tablet (5 mg total) by mouth daily 30 tablet 0    buPROPion (FORFIVO XL) 450 MG 24 hr tablet Take 1 tablet (450 mg total) by mouth daily 30 tablet 1    FLUoxetine (PROzac) 40 MG capsule Take 40 mg by mouth every evening      atorvastatin (LIPITOR) 20 mg tablet TAKE 1 TABLET BY MOUTH EVERY DAY WITH DINNER 30 tablet 0    bacitracin topical ointment 500 units/g topical ointment Apply 1 large application topically 2 (two) times a day 1 large application topically twice a day to PEG tube area 15 g 0    buPROPion (WELLBUTRIN XL) 150 mg 24 hr tablet TAKE ONE TABLET BY MOUTH EVERY DAY WITH 300MG TABLET      carvedilol (COREG) 3 125 mg tablet Take 1 tablet (3 125 mg total) by mouth 2 (two) times a day with meals 30 tablet 0    carvedilol (COREG) 6 25 mg tablet Take 6 25 mg by mouth 2 (two) times a day      CVS B-12 500 MCG tablet TAKE 1 TABLET BY MOUTH DAILY 30 tablet 0    FLUoxetine (PROzac) 10 mg capsule Take 3 capsules (30 mg total) by mouth daily (Patient not taking: Reported on 3/22/2021) 90 capsule 0    FLUoxetine (PROzac) 20 mg capsule Take 20 mg by mouth daily      gabapentin (NEURONTIN) 100 mg capsule TAKE 2 CAPSULES BY MOUTH 3 TIMES A DAY 90 capsule 1    hydrocortisone (ANUSOL-HC) 25 mg suppository Insert 1 suppository (25 mg total) into the rectum 2 (two) times a day 12 suppository 0    hydrocortisone 1 % cream Apply topically 3 (three) times a day 30 g 0    immune globulin, human 20 GM/200ML       levothyroxine 88 mcg tablet Take 1 tablet (88 mcg total) by mouth daily in the early morning 30 tablet 0    OLANZapine (ZyPREXA) 10 mg tablet Take 1 tablet (10 mg total) by mouth daily at bedtime 30 tablet 0    pantoprazole (PROTONIX) 40 mg tablet Take 1 tablet (40 mg total) by mouth daily in the early morning 30 tablet 0    Potassium Chloride ER 20 MEQ TBCR Take 1 tablet by mouth daily With food      saccharomyces boulardii (FLORASTOR) 250 mg capsule Take 1 capsule (250 mg total) by mouth 2 (two) times a day 60 capsule 0    sulfaSALAzine (AZULFIDINE) 500 mg tablet        No current facility-administered medications for this visit  No Known Allergies    Review of Systems   Constitutional: Positive for activity change (limited functioning )  Negative for appetite change  Psychiatric/Behavioral: Negative for sleep disturbance and suicidal ideas  Video Exam    There were no vitals filed for this visit  Physical Exam  Constitutional:       Appearance: Normal appearance  He is normal weight  Neurological:      Mental Status: He is alert  Psychiatric:         Attention and Perception: Perception normal          Mood and Affect: Mood is depressed  Affect is blunt  Speech: Speech is delayed (slow speech at baseline)           Behavior: Behavior normal  Behavior is cooperative  Thought Content:  Thought content normal          Cognition and Memory: Cognition and memory normal           I spent 15 minutes directly with the patient during this visit

## 2022-10-07 ENCOUNTER — TELEPHONE (OUTPATIENT)
Dept: PSYCHIATRY | Facility: CLINIC | Age: 72
End: 2022-10-07

## 2022-10-07 DIAGNOSIS — F33.1 MAJOR DEPRESSIVE DISORDER, RECURRENT, MODERATE (HCC): Primary | ICD-10-CM

## 2022-10-07 RX ORDER — BUPROPION HYDROCHLORIDE 150 MG/1
150 TABLET ORAL EVERY MORNING
Qty: 30 TABLET | Refills: 0 | Status: SHIPPED | OUTPATIENT
Start: 2022-10-07 | End: 2022-11-16 | Stop reason: SDUPTHER

## 2022-10-07 RX ORDER — BUPROPION HYDROCHLORIDE 300 MG/1
300 TABLET ORAL EVERY MORNING
Qty: 30 TABLET | Refills: 0 | Status: SHIPPED | OUTPATIENT
Start: 2022-10-07 | End: 2022-11-16 | Stop reason: SDUPTHER

## 2022-10-07 NOTE — TELEPHONE ENCOUNTER
Patient reporting [employer] will not pay for Bupropion 450mg pill  Previously prescribed Bupropion 150mg and 300mg

## 2022-10-18 ENCOUNTER — TELEPHONE (OUTPATIENT)
Dept: PSYCHIATRY | Facility: CLINIC | Age: 72
End: 2022-10-18

## 2022-10-18 NOTE — TELEPHONE ENCOUNTER
outreached to Patient to schedule a follow-up appointment  Patient was last seen on 10/4 and Dr Randell Perdomo wanted to see patient back in 6w       Message was left, requested a call back at [phone]

## 2022-11-16 ENCOUNTER — TELEMEDICINE (OUTPATIENT)
Dept: PSYCHIATRY | Facility: CLINIC | Age: 72
End: 2022-11-16

## 2022-11-16 DIAGNOSIS — F33.1 MAJOR DEPRESSIVE DISORDER, RECURRENT, MODERATE (HCC): ICD-10-CM

## 2022-11-16 RX ORDER — MELOXICAM 15 MG/1
1 TABLET ORAL DAILY
COMMUNITY
Start: 2022-11-07

## 2022-11-16 RX ORDER — FLUOXETINE HYDROCHLORIDE 40 MG/1
40 CAPSULE ORAL EVERY EVENING
Qty: 180 CAPSULE | Refills: 0 | Status: SHIPPED | OUTPATIENT
Start: 2022-11-16

## 2022-11-16 RX ORDER — ALENDRONATE SODIUM 70 MG/1
1 TABLET ORAL WEEKLY
COMMUNITY
Start: 2022-11-07

## 2022-11-16 RX ORDER — BUPROPION HYDROCHLORIDE 300 MG/1
300 TABLET ORAL EVERY MORNING
Qty: 90 TABLET | Refills: 0 | Status: SHIPPED | OUTPATIENT
Start: 2022-11-16

## 2022-11-16 RX ORDER — ALENDRONATE SODIUM 70 MG/1
TABLET ORAL
COMMUNITY
Start: 2022-11-07

## 2022-11-16 RX ORDER — ASPIRIN 81 MG/1
1 TABLET ORAL DAILY
COMMUNITY

## 2022-11-16 RX ORDER — ARIPIPRAZOLE 10 MG/1
10 TABLET ORAL DAILY
Qty: 30 TABLET | Refills: 1 | Status: SHIPPED | OUTPATIENT
Start: 2022-11-16

## 2022-11-16 RX ORDER — BUPROPION HYDROCHLORIDE 150 MG/1
150 TABLET ORAL EVERY MORNING
Qty: 90 TABLET | Refills: 0 | Status: SHIPPED | OUTPATIENT
Start: 2022-11-16

## 2022-11-16 NOTE — PSYCH
Virtual Regular Visit    Verification of patient location:    Patient is located in the following state in which I hold an active license [place]      Assessment/Plan:    Problem List Items Addressed This Visit    None  Visit Diagnoses     Major depressive disorder, recurrent, moderate (HCC)        Relevant Medications    ARIPiprazole (ABILIFY) 10 mg tablet    FLUoxetine (PROzac) 40 MG capsule    buPROPion (Wellbutrin XL) 300 mg 24 hr tablet    buPROPion (WELLBUTRIN XL) 150 mg 24 hr tablet          Goals addressed in session: Goal 1          Reason for visit is   Chief Complaint   Patient presents with   • Medication Management        Encounter provider Alejandro Alaniz MD    Provider located at [address] [facility]  [address]  [phone]      Recent Visits  No visits were found meeting these conditions  Showing recent visits within past 7 days and meeting all other requirements  Today's Visits  Date Type Provider Dept   11/16/22 Telemedicine Alejandro Alaniz, [facility] today's visits and meeting all other requirements  Future Appointments  No visits were found meeting these conditions  Showing future appointments within next 150 days and meeting all other requirements       The patient was identified by name and date of birth  Charlie Rose was informed that this is a telemedicine visit and that the visit is being conducted [employer]  He agrees to proceed     My office door was closed  No one else was in the room  He acknowledged consent and understanding of privacy and security of the video platform  The patient has agreed to participate and understands they can discontinue the visit at any time  Patient is aware this is a billable service  Subjective  Charlie Rose is a 73 y o  male with depression and anxiety presents for f/u appt     He reports feeling " the same"  Poor balance, s/p several falls - currently in PT 2 x week  Limited ADLs, but takes care of himself      HPI   Mood - remains depressed, low energy and low motivation   Poor functioning b/o medical problems  Anxiety - increased racing thoughts lately  Poor sleep recently - interrupted  psychosis - denies delusions or AH    Past Medical History:   Diagnosis Date   • CIDP (chronic inflammatory demyelinating polyneuropathy) ([facility])    • Crohn disease ([facility])    • Degenerative disc disease, lumbar    • Disease of thyroid gland    • GERD (gastroesophageal reflux disease)    • Myocardial infarction ([facility] [address] )    • Psychiatric disorder        Past Surgical History:   Procedure Laterality Date   • LAPAROSCOPIC APPENDECTOMY     • PEG TUBE PLACEMENT     • PROSTATECTOMY         Current Outpatient Medications   Medication Sig Dispense Refill   • alendronate (Fosamax) 70 mg tablet Take 1 tablet by mouth Once a week     • ARIPiprazole (ABILIFY) 10 mg tablet Take 1 tablet (10 mg total) by mouth daily 30 tablet 1   • buPROPion (WELLBUTRIN XL) 150 mg 24 hr tablet Take 1 tablet (150 mg total) by mouth every morning With a 300mg tab 90 tablet 0   • buPROPion (Wellbutrin XL) 300 mg 24 hr tablet Take 1 tablet (300 mg total) by mouth every morning With a 150 mg tab 90 tablet 0   • FLUoxetine (PROzac) 40 MG capsule Take 1 capsule (40 mg total) by mouth every evening 180 capsule 0   • meloxicam (MOBIC) 15 mg tablet Take 1 tablet by mouth Daily     • alendronate (FOSAMAX) 70 mg tablet TAKE 1 TABLET BY MOUTH ONCE WEEKLY IN THE MORNING AT LEAST 30 MINUTES BEFORE FIRST FOOD, BEVERAGE OR MEDICATION OF THE DAY     • aspirin (ECOTRIN LOW STRENGTH) 81 mg EC tablet Take 1 tablet by mouth Daily     • atorvastatin (LIPITOR) 20 mg tablet TAKE 1 TABLET BY MOUTH EVERY DAY WITH DINNER 30 tablet 0   • bacitracin topical ointment 500 units/g topical ointment Apply 1 large application topically 2 (two) times a day 1 large application topically twice a day to PEG tube area 15 g 0 • carvedilol (COREG) 3 125 mg tablet Take 1 tablet (3 125 mg total) by mouth 2 (two) times a day with meals 30 tablet 0   • carvedilol (COREG) 6 25 mg tablet Take 6 25 mg by mouth 2 (two) times a day     • [facility] B-12 500 MCG tablet TAKE 1 TABLET BY MOUTH DAILY 30 tablet 0   • gabapentin (NEURONTIN) 100 mg capsule TAKE 2 CAPSULES BY MOUTH 3 TIMES A DAY 90 capsule 1   • hydrocortisone (ANUSOL-HC) 25 mg suppository Insert 1 suppository (25 mg total) into the rectum 2 (two) times a day 12 suppository 0   • hydrocortisone 1 % cream Apply topically 3 (three) times a day 30 g 0   • immune globulin, human 20 GM/200ML      • levothyroxine 88 mcg tablet Take 1 tablet (88 mcg total) by mouth daily in the early morning 30 tablet 0   • pantoprazole (PROTONIX) 40 mg tablet Take 1 tablet (40 mg total) by mouth daily in the early morning 30 tablet 0   • Potassium Chloride ER 20 MEQ TBCR Take 1 tablet by mouth daily With food     • saccharomyces boulardii (FLORASTOR) 250 mg capsule Take 1 capsule (250 mg total) by mouth 2 (two) times a day 60 capsule 0   • sulfaSALAzine (AZULFIDINE) 500 mg tablet        No current facility-administered medications for this visit  No Known Allergies    Review of Systems   Constitutional: Positive for activity change (limited activities at baseline)  Negative for appetite change  Musculoskeletal: Positive for back pain  Psychiatric/Behavioral: Positive for sleep disturbance (interrupted sleep)  Negative for suicidal ideas  Video Exam    There were no vitals filed for this visit  Physical Exam  Constitutional:       Comments: Verduzco  Missing teeth   Neurological:      Mental Status: He is alert  Psychiatric:         Attention and Perception: Perception normal          Mood and Affect: Mood is depressed  Affect is blunt  Speech: Speech is delayed  Behavior: Behavior is cooperative           Judgment: Judgment normal           Visit Time    Visit Start Time: 1 58 pm  Visit Stop Time: 2 04 pm   Total Visit Duration: 16 minutes

## 2023-01-11 ENCOUNTER — TELEMEDICINE (OUTPATIENT)
Dept: PSYCHIATRY | Facility: CLINIC | Age: 73
End: 2023-01-11

## 2023-01-11 DIAGNOSIS — F33.1 MAJOR DEPRESSIVE DISORDER, RECURRENT, MODERATE (HCC): Primary | ICD-10-CM

## 2023-01-11 DIAGNOSIS — F41.1 GENERALIZED ANXIETY DISORDER: ICD-10-CM

## 2023-01-11 RX ORDER — BUPROPION HYDROCHLORIDE 150 MG/1
150 TABLET ORAL EVERY MORNING
Qty: 90 TABLET | Refills: 0 | Status: SHIPPED | OUTPATIENT
Start: 2023-01-11

## 2023-01-11 RX ORDER — FLUOXETINE HYDROCHLORIDE 40 MG/1
CAPSULE ORAL
Qty: 180 CAPSULE | Refills: 0 | Status: SHIPPED | OUTPATIENT
Start: 2023-01-11

## 2023-01-11 RX ORDER — BUPROPION HYDROCHLORIDE 300 MG/1
300 TABLET ORAL EVERY MORNING
Qty: 90 TABLET | Refills: 0 | Status: SHIPPED | OUTPATIENT
Start: 2023-01-11

## 2023-01-11 RX ORDER — ARIPIPRAZOLE 10 MG/1
10 TABLET ORAL DAILY
Qty: 90 TABLET | Refills: 0 | Status: SHIPPED | OUTPATIENT
Start: 2023-01-11

## 2023-01-11 NOTE — PSYCH
Virtual Regular Visit    Verification of patient location:    Patient is located in the following state in which I hold an active license [place]      Assessment/Plan:    Problem List Items Addressed This Visit    None  Visit Diagnoses     Major depressive disorder, recurrent, moderate (HCC)    -  Primary    Relevant Medications    buPROPion (WELLBUTRIN XL) 150 mg 24 hr tablet    buPROPion (Wellbutrin XL) 300 mg 24 hr tablet    ARIPiprazole (ABILIFY) 10 mg tablet    FLUoxetine (PROzac) 40 MG capsule    Generalized anxiety disorder        Relevant Medications    buPROPion (WELLBUTRIN XL) 150 mg 24 hr tablet    buPROPion (Wellbutrin XL) 300 mg 24 hr tablet    ARIPiprazole (ABILIFY) 10 mg tablet    FLUoxetine (PROzac) 40 MG capsule          Goals addressed in session: Goal 1          Reason for visit is   Chief Complaint   Patient presents with   • Medication Management        Encounter provider Savita Elam MD    Provider located at [address] [facility]  [address]  [phone]      Recent Visits  No visits were found meeting these conditions  Showing recent visits within past 7 days and meeting all other requirements  Today's Visits  Date Type Provider Dept   01/11/23 Telemedicine Savita Elam, [address] [place] [address] today's visits and meeting all other requirements  Future Appointments  No visits were found meeting these conditions  Showing future appointments within next 150 days and meeting all other requirements       The patient was identified by name and date of birth  Nayana Bashir was informed that this is a telemedicine visit and that the visit is being conducted [employer]  He agrees to proceed     My office door was closed  No one else was in the room  He acknowledged consent and understanding of privacy and security of the video platform   The patient has agreed to participate and understands they can discontinue the visit at any time  Patient is aware this is a billable service  Subjective  Maxine Conrad is a 81 y o  male with depression and anxiety presents for regular f/u      Compliant with meds, denies SE  S/p falls x 2 - [facility]; admission  - I reviewed notes  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, nurse  Back on ASA and meloxicam  Walks with walker        HPI   Mood - reports feeling " the same", not worse  Upset about health problems and limited functioning  Low energy  Likes to spend time with family  Anxiety - baseline    Past Medical History:   Diagnosis Date   • CIDP (chronic inflammatory demyelinating polyneuropathy) ([facility])    • [facility] ([facility] )    • Degenerative disc disease, lumbar    • Disease of thyroid gland    • GERD (gastroesophageal reflux disease)    • Myocardial infarction ([facility] )    • Psychiatric disorder        Past Surgical History:   Procedure Laterality Date   • LAPAROSCOPIC APPENDECTOMY     • PEG TUBE PLACEMENT     • PROSTATECTOMY         Current Outpatient Medications   Medication Sig Dispense Refill   • ARIPiprazole (ABILIFY) 10 mg tablet Take 1 tablet (10 mg total) by mouth daily 90 tablet 0   • buPROPion (WELLBUTRIN XL) 150 mg 24 hr tablet Take 1 tablet (150 mg total) by mouth every morning With a 300mg tab 90 tablet 0   • buPROPion (Wellbutrin XL) 300 mg 24 hr tablet Take 1 tablet (300 mg total) by mouth every morning With a 150 mg tab 90 tablet 0   • FLUoxetine (PROzac) 40 MG capsule Take 2 capsules in th evening 180 capsule 0   • alendronate (FOSAMAX) 70 mg tablet TAKE 1 TABLET BY MOUTH ONCE WEEKLY IN THE MORNING AT LEAST 30 MINUTES BEFORE FIRST FOOD, BEVERAGE OR MEDICATION OF THE DAY     • alendronate (Fosamax) 70 mg tablet Take 1 tablet by mouth Once a week     • aspirin (ECOTRIN LOW STRENGTH) 81 mg EC tablet Take 1 tablet by mouth Daily     • atorvastatin (LIPITOR) 20 mg tablet TAKE 1 TABLET BY MOUTH EVERY DAY WITH DINNER 30 tablet 0   • bacitracin topical ointment 500 units/g topical ointment Apply 1 large application topically 2 (two) times a day 1 large application topically twice a day to PEG tube area 15 g 0   • carvedilol (COREG) 3 125 mg tablet Take 1 tablet (3 125 mg total) by mouth 2 (two) times a day with meals 30 tablet 0   • carvedilol (COREG) 6 25 mg tablet Take 6 25 mg by mouth 2 (two) times a day     • [facility]S B-12 500 MCG tablet TAKE 1 TABLET BY MOUTH DAILY 30 tablet 0   • gabapentin (NEURONTIN) 100 mg capsule TAKE 2 CAPSULES BY MOUTH 3 TIMES A DAY 90 capsule 1   • hydrocortisone (ANUSOL-HC) 25 mg suppository Insert 1 suppository (25 mg total) into the rectum 2 (two) times a day 12 suppository 0   • hydrocortisone 1 % cream Apply topically 3 (three) times a day 30 g 0   • immune globulin, human 20 GM/200ML      • levothyroxine 88 mcg tablet Take 1 tablet (88 mcg total) by mouth daily in the early morning 30 tablet 0   • meloxicam (MOBIC) 15 mg tablet Take 1 tablet by mouth Daily     • pantoprazole (PROTONIX) 40 mg tablet Take 1 tablet (40 mg total) by mouth daily in the early morning 30 tablet 0   • Potassium Chloride ER 20 MEQ TBCR Take 1 tablet by mouth daily With food     • saccharomyces boulardii (FLORASTOR) 250 mg capsule Take 1 capsule (250 mg total) by mouth 2 (two) times a day 60 capsule 0   • sulfaSALAzine (AZULFIDINE) 500 mg tablet        No current facility-administered medications for this visit  No Known Allergies    Review of Systems   Constitutional: Positive for activity change (minimal activity)  Negative for appetite change  Neurological:        S/p falls, [facility]   Psychiatric/Behavioral: Negative for sleep disturbance and suicidal ideas  Video Exam    There were no vitals filed for this visit  Physical Exam  Constitutional:       Appearance: Normal appearance  He is normal weight  Neurological:      Mental Status: He is alert  Psychiatric:         Attention and Perception: Perception normal          Mood and Affect: Mood is depressed  Affect is flat  Speech: Speech is delayed  Behavior: Behavior normal  Behavior is cooperative  Thought Content:  Thought content normal          Judgment: Judgment normal       Comments: Slow, monotone speech          Visit Time    Visit Start Time: 1 40 pm   Visit Stop Time: 1 47 pm   Total Visit Duration: 17 minutes

## 2023-02-07 ENCOUNTER — TELEMEDICINE (OUTPATIENT)
Dept: PSYCHIATRY | Facility: CLINIC | Age: 73
End: 2023-02-07

## 2023-02-07 DIAGNOSIS — F41.1 GENERALIZED ANXIETY DISORDER: ICD-10-CM

## 2023-02-07 DIAGNOSIS — F33.1 MAJOR DEPRESSIVE DISORDER, RECURRENT, MODERATE (HCC): Primary | ICD-10-CM

## 2023-02-07 NOTE — PSYCH
Virtual Regular Visit    Verification of patient location:    Patient is located in the following state in which I hold an active license [place]      Assessment/Plan:    Problem List Items Addressed This Visit    None  Visit Diagnoses     Major depressive disorder, recurrent, moderate ([facility] [address] )    -  Primary    Generalized anxiety disorder              Goals addressed in session: Goal 1          Reason for visit is   Chief Complaint   Patient presents with   • Medication Management        Encounter provider Radha Madden MD    Provider located at [address] [facility]  [address]  [phone]      Recent Visits  No visits were found meeting these conditions  Showing recent visits within past 7 days and meeting all other requirements  Today's Visits  Date Type Provider Dept   02/07/23 Telemedicine Radha Madden, [address] [facility] today's visits and meeting all other requirements  Future Appointments  No visits were found meeting these conditions  Showing future appointments within next 150 days and meeting all other requirements       The patient was identified by name and date of birth  Ariane Cobb was informed that this is a telemedicine visit and that the visit is being conducted [employer]  He agrees to proceed     My office door was closed  No one else was in the room  He acknowledged consent and understanding of privacy and security of the video platform  The patient has agreed to participate and understands they can discontinue the visit at any time  Interview was started on amwell, but continued on the phone b/o poor audio connection  Patient is aware this is a billable service  Subjective  Ariane Cobb is a 81 y o  male with depression and anxiety presents for regular f/u      Complaint with meds, denies SE  PT 2 x week, walks with a walker  No falls      HPI   Mood - remains depressed - baseline   Low energy, upset about limited functioning  Minimal ADLs  Anxiety - increased worries and racing thoughts today - pt had difficulty connecting on amwell, " e-mail is not working"    Past Medical History:   Diagnosis Date   • CIDP (chronic inflammatory demyelinating polyneuropathy) ([facility] [address] )    • Crohn disease ([facility] )    • Degenerative disc disease, lumbar    • Disease of thyroid gland    • GERD (gastroesophageal reflux disease)    • Myocardial infarction ([facility] )    • Psychiatric disorder        Past Surgical History:   Procedure Laterality Date   • LAPAROSCOPIC APPENDECTOMY     • PEG TUBE PLACEMENT     • PROSTATECTOMY         Current Outpatient Medications   Medication Sig Dispense Refill   • ARIPiprazole (ABILIFY) 10 mg tablet Take 1 tablet (10 mg total) by mouth daily 90 tablet 0   • buPROPion (WELLBUTRIN XL) 150 mg 24 hr tablet Take 1 tablet (150 mg total) by mouth every morning With a 300mg tab 90 tablet 0   • buPROPion (Wellbutrin XL) 300 mg 24 hr tablet Take 1 tablet (300 mg total) by mouth every morning With a 150 mg tab 90 tablet 0   • FLUoxetine (PROzac) 40 MG capsule Take 2 capsules in th evening 180 capsule 0   • alendronate (FOSAMAX) 70 mg tablet TAKE 1 TABLET BY MOUTH ONCE WEEKLY IN THE MORNING AT LEAST 30 MINUTES BEFORE FIRST FOOD, BEVERAGE OR MEDICATION OF THE DAY     • alendronate (Fosamax) 70 mg tablet Take 1 tablet by mouth Once a week     • aspirin (ECOTRIN LOW STRENGTH) 81 mg EC tablet Take 1 tablet by mouth Daily     • atorvastatin (LIPITOR) 20 mg tablet TAKE 1 TABLET BY MOUTH EVERY DAY WITH DINNER 30 tablet 0   • bacitracin topical ointment 500 units/g topical ointment Apply 1 large application topically 2 (two) times a day 1 large application topically twice a day to PEG tube area 15 g 0   • carvedilol (COREG) 3 125 mg tablet Take 1 tablet (3 125 mg total) by mouth 2 (two) times a day with meals 30 tablet 0   • carvedilol (COREG) 6 25 mg tablet Take 6 25 mg by mouth 2 (two) times a day • [facility]S B-12 500 MCG tablet TAKE 1 TABLET BY MOUTH DAILY 30 tablet 0   • gabapentin (NEURONTIN) 100 mg capsule TAKE 2 CAPSULES BY MOUTH 3 TIMES A DAY 90 capsule 1   • hydrocortisone (ANUSOL-HC) 25 mg suppository Insert 1 suppository (25 mg total) into the rectum 2 (two) times a day 12 suppository 0   • hydrocortisone 1 % cream Apply topically 3 (three) times a day 30 g 0   • immune globulin, human 20 GM/200ML      • levothyroxine 88 mcg tablet Take 1 tablet (88 mcg total) by mouth daily in the early morning 30 tablet 0   • meloxicam (MOBIC) 15 mg tablet Take 1 tablet by mouth Daily     • pantoprazole (PROTONIX) 40 mg tablet Take 1 tablet (40 mg total) by mouth daily in the early morning 30 tablet 0   • Potassium Chloride ER 20 MEQ TBCR Take 1 tablet by mouth daily With food     • saccharomyces boulardii (FLORASTOR) 250 mg capsule Take 1 capsule (250 mg total) by mouth 2 (two) times a day 60 capsule 0   • sulfaSALAzine (AZULFIDINE) 500 mg tablet        No current facility-administered medications for this visit  No Known Allergies    Review of Systems   Constitutional: Positive for activity change (not very active)  Negative for appetite change  Psychiatric/Behavioral: Positive for dysphoric mood  Negative for sleep disturbance and suicidal ideas  The patient is nervous/anxious  Video Exam    There were no vitals filed for this visit  Physical Exam  Neurological:      Mental Status: He is alert  Psychiatric:         Attention and Perception: Attention and perception normal          Mood and Affect: Mood is anxious and depressed  Behavior: Behavior is cooperative  Thought Content:  Thought content normal          Judgment: Judgment normal       Comments: Monotone speech  Assessment is limited due to phone interview          Visit Time    Visit Start Time: 1 40 pm   Visit Stop Time: 1 50 pm   Total Visit Duration: 18 minutes Virtual Brief Visit    Patient is located in the following state in which I hold an active license [place]      Assessment/Plan:    Problem List Items Addressed This Visit    None  Visit Diagnoses     Major depressive disorder, recurrent, moderate ([facility] [address] )    -  Primary    Generalized anxiety disorder              Recent Visits  No visits were found meeting these conditions  Showing recent visits within past 7 days and meeting all other requirements  Today's Visits  Date Type Provider Dept   02/07/23 Telemedicine Karrie Moon, [address] [facility] today's visits and meeting all other requirements  Future Appointments  No visits were found meeting these conditions    Showing future appointments within next 150 days and meeting all other requirements         Visit Time    Visit Start Time: 1 40 pm   Visit Stop Time: 1 50 pm   Total Visit Duration: 18 minutes

## 2023-03-01 ENCOUNTER — TELEMEDICINE (OUTPATIENT)
Dept: PSYCHIATRY | Facility: CLINIC | Age: 73
End: 2023-03-01

## 2023-03-01 DIAGNOSIS — F33.1 MAJOR DEPRESSIVE DISORDER, RECURRENT, MODERATE (HCC): ICD-10-CM

## 2023-03-01 RX ORDER — FLUOXETINE HYDROCHLORIDE 40 MG/1
CAPSULE ORAL
Qty: 180 CAPSULE | Refills: 0 | Status: SHIPPED | OUTPATIENT
Start: 2023-03-01

## 2023-03-01 RX ORDER — ARIPIPRAZOLE 10 MG/1
10 TABLET ORAL DAILY
Qty: 90 TABLET | Refills: 0 | Status: SHIPPED | OUTPATIENT
Start: 2023-03-01

## 2023-03-01 NOTE — PSYCH
Virtual Regular Visit    Verification of patient location:    Patient is located in the following state in which I hold an active license [place]      Assessment/Plan:    Problem List Items Addressed This Visit    None  Visit Diagnoses     Major depressive disorder, recurrent, moderate (HCC)        Relevant Medications    FLUoxetine (PROzac) 40 MG capsule    ARIPiprazole (ABILIFY) 10 mg tablet          Goals addressed in session: Goal 1          Reason for visit is   Chief Complaint   Patient presents with   • Medication Management        Encounter provider Antonio Simon MD    Provider located at [address] [facility]  [address]  [phone]      Recent Visits  No visits were found meeting these conditions  Showing recent visits within past 7 days and meeting all other requirements  Today's Visits  Date Type Provider Dept   03/01/23 Telemedicine Antonio Simon, [facility] today's visits and meeting all other requirements  Future Appointments  No visits were found meeting these conditions  Showing future appointments within next 150 days and meeting all other requirements       The patient was identified by name and date of birth  Hollis Isaac was informed that this is a telemedicine visit and that the visit is being conducted [employer]  He agrees to proceed     My office door was closed  No one else was in the room  He acknowledged consent and understanding of privacy and security of the video platform  The patient has agreed to participate and understands they can discontinue the visit at any time  Patient is aware this is a billable service  Subjective  Hollis Isaac is a 81 y o  male with depression and anxiety presents for regular f/u     compliant with meds, denies SE  PT 2 x week  Walks with walker, gets outside " a little bit"    HPI   Mood - same - low energy, but not sad  Social with family; limited AFDLs ( health problems)  Anxiety - denies  Psychosis - denies    Past Medical History:   Diagnosis Date   • CIDP (chronic inflammatory demyelinating polyneuropathy) ([facility])    • Crohn disease ([facility] )    • Degenerative disc disease, lumbar    • Disease of thyroid gland    • GERD (gastroesophageal reflux disease)    • Myocardial infarction ([facility] )    • Psychiatric disorder        Past Surgical History:   Procedure Laterality Date   • LAPAROSCOPIC APPENDECTOMY     • PEG TUBE PLACEMENT     • PROSTATECTOMY         Current Outpatient Medications   Medication Sig Dispense Refill   • ARIPiprazole (ABILIFY) 10 mg tablet Take 1 tablet (10 mg total) by mouth daily 90 tablet 0   • buPROPion (WELLBUTRIN XL) 150 mg 24 hr tablet Take 1 tablet (150 mg total) by mouth every morning With a 300mg tab 90 tablet 0   • buPROPion (Wellbutrin XL) 300 mg 24 hr tablet Take 1 tablet (300 mg total) by mouth every morning With a 150 mg tab 90 tablet 0   • FLUoxetine (PROzac) 40 MG capsule Take 2 capsules in th evening 180 capsule 0   • alendronate (FOSAMAX) 70 mg tablet TAKE 1 TABLET BY MOUTH ONCE WEEKLY IN THE MORNING AT LEAST 30 MINUTES BEFORE FIRST FOOD, BEVERAGE OR MEDICATION OF THE DAY     • alendronate (Fosamax) 70 mg tablet Take 1 tablet by mouth Once a week     • aspirin (ECOTRIN LOW STRENGTH) 81 mg EC tablet Take 1 tablet by mouth Daily     • atorvastatin (LIPITOR) 20 mg tablet TAKE 1 TABLET BY MOUTH EVERY DAY WITH DINNER 30 tablet 0   • bacitracin topical ointment 500 units/g topical ointment Apply 1 large application topically 2 (two) times a day 1 large application topically twice a day to PEG tube area 15 g 0   • carvedilol (COREG) 3 125 mg tablet Take 1 tablet (3 125 mg total) by mouth 2 (two) times a day with meals 30 tablet 0   • carvedilol (COREG) 6 25 mg tablet Take 6 25 mg by mouth 2 (two) times a day     • CVS B-12 500 MCG tablet TAKE 1 TABLET BY MOUTH DAILY 30 tablet 0   • gabapentin (NEURONTIN) 100 mg capsule TAKE 2 CAPSULES BY MOUTH 3 TIMES A DAY 90 capsule 1   • hydrocortisone (ANUSOL-HC) 25 mg suppository Insert 1 suppository (25 mg total) into the rectum 2 (two) times a day 12 suppository 0   • hydrocortisone 1 % cream Apply topically 3 (three) times a day 30 g 0   • immune globulin, human 20 GM/200ML      • levothyroxine 88 mcg tablet Take 1 tablet (88 mcg total) by mouth daily in the early morning 30 tablet 0   • meloxicam (MOBIC) 15 mg tablet Take 1 tablet by mouth Daily     • pantoprazole (PROTONIX) 40 mg tablet Take 1 tablet (40 mg total) by mouth daily in the early morning 30 tablet 0   • Potassium Chloride ER 20 MEQ TBCR Take 1 tablet by mouth daily With food     • saccharomyces boulardii (FLORASTOR) 250 mg capsule Take 1 capsule (250 mg total) by mouth 2 (two) times a day 60 capsule 0   • sulfaSALAzine (AZULFIDINE) 500 mg tablet        No current facility-administered medications for this visit  No Known Allergies    Review of Systems   Constitutional: Negative for activity change and appetite change  Psychiatric/Behavioral: Negative for dysphoric mood, sleep disturbance and suicidal ideas  The patient is not nervous/anxious  Video Exam    There were no vitals filed for this visit  Physical Exam  Constitutional:       Appearance: He is normal weight  Comments: Slightly unkempt, missing teeth   Neurological:      Mental Status: He is alert  Psychiatric:         Attention and Perception: Perception normal          Mood and Affect: Mood is depressed  Affect is blunt  Behavior: Behavior normal  Behavior is cooperative  Thought Content:  Thought content normal          Judgment: Judgment normal       Comments: Monotone slow speech - baseline          Visit Time    Visit Start Time: 12 57 pm   Visit Stop Time: 1 02 pm   Total Visit Duration: 15 minutes

## 2023-04-24 ENCOUNTER — TELEMEDICINE (OUTPATIENT)
Dept: PSYCHIATRY | Facility: CLINIC | Age: 73
End: 2023-04-24

## 2023-04-24 DIAGNOSIS — F41.1 GENERALIZED ANXIETY DISORDER: Primary | ICD-10-CM

## 2023-04-24 DIAGNOSIS — F33.1 MAJOR DEPRESSIVE DISORDER, RECURRENT, MODERATE (HCC): ICD-10-CM

## 2023-04-24 RX ORDER — FLUOXETINE HYDROCHLORIDE 40 MG/1
CAPSULE ORAL
Qty: 180 CAPSULE | Refills: 0 | Status: SHIPPED | OUTPATIENT
Start: 2023-04-24

## 2023-04-24 RX ORDER — BUPROPION HYDROCHLORIDE 150 MG/1
150 TABLET ORAL EVERY MORNING
Qty: 90 TABLET | Refills: 0 | Status: SHIPPED | OUTPATIENT
Start: 2023-04-24

## 2023-04-24 RX ORDER — BUPROPION HYDROCHLORIDE 300 MG/1
300 TABLET ORAL EVERY MORNING
Qty: 90 TABLET | Refills: 0 | Status: SHIPPED | OUTPATIENT
Start: 2023-04-24

## 2023-04-24 RX ORDER — ARIPIPRAZOLE 10 MG/1
10 TABLET ORAL DAILY
Qty: 90 TABLET | Refills: 0 | Status: SHIPPED | OUTPATIENT
Start: 2023-04-24

## 2023-04-24 NOTE — PSYCH
"  Virtual Regular Visit    Verification of patient location:    Patient is located at Home in the following state in which I hold an active license [place]      Assessment/Plan:    Problem List Items Addressed This Visit    None  Visit Diagnoses     Major depressive disorder, recurrent, moderate ([facility] )              Goals addressed in session: Goal 1          Reason for visit is   Chief Complaint   Patient presents with   • Medication Management        Encounter provider Facundo Ocampo MD    Provider located at [address] [facility] [address]  [address]  [phone]      Recent Visits  No visits were found meeting these conditions  Showing recent visits within past 7 days and meeting all other requirements  Today's Visits  Date Type Provider Dept   04/24/23 Telemedicine Facundo Ocampo, [facility] today's visits and meeting all other requirements  Future Appointments  No visits were found meeting these conditions  Showing future appointments within next 150 days and meeting all other requirements       The patient was identified by name and date of birth  Carrie Reyna was informed that this is a telemedicine visit and that the visit is being conducted [employer]  He agrees to proceed     My office door was closed  No one else was in the room  He acknowledged consent and understanding of privacy and security of the video platform  The patient has agreed to participate and understands they can discontinue the visit at any time  Part of the interview was on the phone b/o poor audio connection  Patient is aware this is a billable service       Subjective  Carrie Reyna is a 81 y o  male with depression presents for regular f/u    compliant with meds, denies SE  On PT; walks with walker        HPI   Mood - continues to c/o feeling \" depressed\" - on further questioning reveals he has little interest in life - " spends his time watching TV   Less sadness, same energy; low motivation  Anxiety - denies panic attacks or racing thoughts  Psychosis - denies delusions      Past Medical History:   Diagnosis Date   • CIDP (chronic inflammatory demyelinating polyneuropathy) ([facility])    • Crohn disease (HCC)    • Degenerative disc disease, lumbar    • Disease of thyroid gland    • GERD (gastroesophageal reflux disease)    • Myocardial infarction ([facility] )    • Psychiatric disorder        Past Surgical History:   Procedure Laterality Date   • LAPAROSCOPIC APPENDECTOMY     • PEG TUBE PLACEMENT     • PROSTATECTOMY         Current Outpatient Medications   Medication Sig Dispense Refill   • buPROPion (WELLBUTRIN XL) 150 mg 24 hr tablet Take 1 tablet (150 mg total) by mouth every morning With a 300mg tab 90 tablet 0   • buPROPion (Wellbutrin XL) 300 mg 24 hr tablet Take 1 tablet (300 mg total) by mouth every morning With a 150 mg tab 90 tablet 0   • FLUoxetine (PROzac) 40 MG capsule Take 2 capsules in th evening 180 capsule 0   • alendronate (FOSAMAX) 70 mg tablet TAKE 1 TABLET BY MOUTH ONCE WEEKLY IN THE MORNING AT LEAST 30 MINUTES BEFORE FIRST FOOD, BEVERAGE OR MEDICATION OF THE DAY     • alendronate (Fosamax) 70 mg tablet Take 1 tablet by mouth Once a week     • ARIPiprazole (ABILIFY) 10 mg tablet Take 1 tablet (10 mg total) by mouth daily 90 tablet 0   • aspirin (ECOTRIN LOW STRENGTH) 81 mg EC tablet Take 1 tablet by mouth Daily     • atorvastatin (LIPITOR) 20 mg tablet TAKE 1 TABLET BY MOUTH EVERY DAY WITH DINNER 30 tablet 0   • bacitracin topical ointment 500 units/g topical ointment Apply 1 large application topically 2 (two) times a day 1 large application topically twice a day to PEG tube area 15 g 0   • carvedilol (COREG) 3 125 mg tablet Take 1 tablet (3 125 mg total) by mouth 2 (two) times a day with meals 30 tablet 0   • carvedilol (COREG) 6 25 mg tablet Take 6 25 mg by mouth 2 (two) times a day     • CVS B-12 500 MCG tablet TAKE 1 TABLET BY MOUTH DAILY 30 tablet 0   • gabapentin (NEURONTIN) 100 mg capsule TAKE 2 CAPSULES BY MOUTH 3 TIMES A DAY 90 capsule 1   • hydrocortisone (ANUSOL-HC) 25 mg suppository Insert 1 suppository (25 mg total) into the rectum 2 (two) times a day 12 suppository 0   • hydrocortisone 1 % cream Apply topically 3 (three) times a day 30 g 0   • immune globulin, human 20 GM/200ML      • levothyroxine 88 mcg tablet Take 1 tablet (88 mcg total) by mouth daily in the early morning 30 tablet 0   • meloxicam (MOBIC) 15 mg tablet Take 1 tablet by mouth Daily     • pantoprazole (PROTONIX) 40 mg tablet Take 1 tablet (40 mg total) by mouth daily in the early morning 30 tablet 0   • Potassium Chloride ER 20 MEQ TBCR Take 1 tablet by mouth daily With food     • saccharomyces boulardii (FLORASTOR) 250 mg capsule Take 1 capsule (250 mg total) by mouth 2 (two) times a day 60 capsule 0   • sulfaSALAzine (AZULFIDINE) 500 mg tablet        No current facility-administered medications for this visit  No Known Allergies    Review of Systems   Constitutional: Negative for activity change and appetite change  Psychiatric/Behavioral: Positive for dysphoric mood  Negative for sleep disturbance and suicidal ideas  The patient is not nervous/anxious  Video Exam    There were no vitals filed for this visit  Physical Exam  Constitutional:       Appearance: Normal appearance  He is normal weight  Neurological:      Mental Status: He is alert  Psychiatric:         Attention and Perception: Attention and perception normal          Mood and Affect: Mood is depressed  Affect is blunt  Behavior: Behavior normal  Behavior is cooperative  Thought Content:  Thought content normal          Judgment: Judgment normal       Comments: Slow monotone speech  - baseline          Visit Time    Visit Start Time: 4 37 pm   Visit Stop Time: 4 47 pm   Total Visit Duration: 20 minutes

## 2023-04-24 NOTE — PATIENT INSTRUCTIONS
Continue abilify, prozac, wellbutrin  Pt declined therapy  Daily activities  - reading, music, getting outside, activities around the house d/w pt

## 2023-06-19 ENCOUNTER — TELEMEDICINE (OUTPATIENT)
Dept: PSYCHIATRY | Facility: CLINIC | Age: 73
End: 2023-06-19
Payer: MEDICARE

## 2023-06-19 DIAGNOSIS — F41.1 GENERALIZED ANXIETY DISORDER: ICD-10-CM

## 2023-06-19 DIAGNOSIS — F33.1 MAJOR DEPRESSIVE DISORDER, RECURRENT, MODERATE (HCC): Primary | ICD-10-CM

## 2023-06-19 PROCEDURE — 99214 OFFICE O/P EST MOD 30 MIN: CPT | Performed by: PSYCHIATRY & NEUROLOGY

## 2023-06-19 RX ORDER — BUPROPION HYDROCHLORIDE 150 MG/1
150 TABLET ORAL EVERY MORNING
Qty: 90 TABLET | Refills: 0 | Status: SHIPPED | OUTPATIENT
Start: 2023-06-19

## 2023-06-19 RX ORDER — OLANZAPINE 10 MG/1
TABLET ORAL
COMMUNITY
End: 2023-06-19

## 2023-06-19 RX ORDER — ASPIRIN 81 MG/1
TABLET, CHEWABLE ORAL
COMMUNITY

## 2023-06-19 RX ORDER — MULTIVIT-MIN/IRON/FOLIC ACID/K 18-600-40
CAPSULE ORAL EVERY 24 HOURS
COMMUNITY
Start: 2023-06-09

## 2023-06-19 RX ORDER — BUPROPION HYDROCHLORIDE 300 MG/1
300 TABLET ORAL EVERY MORNING
Qty: 90 TABLET | Refills: 0 | Status: SHIPPED | OUTPATIENT
Start: 2023-06-19

## 2023-06-19 RX ORDER — CHOLECALCIFEROL (VITAMIN D3) 10(400)/ML
DROPS ORAL
COMMUNITY

## 2023-06-19 NOTE — PSYCH
"  Virtual Regular Visit    Verification of patient location:    Patient is located at Home in the following state in which I hold an active license [place]      Assessment/Plan:    Problem List Items Addressed This Visit    None      Goals addressed in session: Goal 1          Reason for visit is   Chief Complaint   Patient presents with   • Medication Management        Encounter provider Syeda Fitzpatrick MD    Provider located at [address] [facility] [address]  [address]  [phone]      Recent Visits  No visits were found meeting these conditions  Showing recent visits within past 7 days and meeting all other requirements  Today's Visits  Date Type Provider Dept   06/19/23 Telemedicine Syeda Fitzpatrick, [address] [address] today's visits and meeting all other requirements  Future Appointments  No visits were found meeting these conditions  Showing future appointments within next 150 days and meeting all other requirements       The patient was identified by name and date of birth  Fay Morgan was informed that this is a telemedicine visit and that the visit is being conducted [employer]  He agrees to proceed     My office door was closed  No one else was in the room  He acknowledged consent and understanding of privacy and security of the video platform  The patient has agreed to participate and understands they can discontinue the visit at any time  Poor audio connecrion  Patient is aware this is a billable service       Subjective  Fay Morgan is a 81 y o  male  With depression presents for regular f/u    compliant with meds, denies SE  Recent f/u for neuropathy and osteoporosis; on vit D and B 12; PT and infusions    HPI   Mood - feeling \" the same\" - stays mostly inside, watching TV; does ADLs, low energy; baseline depression  Anxiety - controlled; denies panic attacks    Past Medical History:   Diagnosis " Date   • CIDP (chronic inflammatory demyelinating polyneuropathy) ([facility])    • Crohn disease (HCC)    • Degenerative disc disease, lumbar    • Disease of thyroid gland    • GERD (gastroesophageal reflux disease)    • Myocardial infarction [facility])    • Psychiatric disorder        Past Surgical History:   Procedure Laterality Date   • LAPAROSCOPIC APPENDECTOMY     • PEG TUBE PLACEMENT     • PROSTATECTOMY         Current Outpatient Medications   Medication Sig Dispense Refill   • Calcium Carb-Cholecalciferol (Calcium-Vitamin D3) 600-10 MG-MCG CAPS every 24 hours     • alendronate (FOSAMAX) 70 mg tablet TAKE 1 TABLET BY MOUTH ONCE WEEKLY IN THE MORNING AT LEAST 30 MINUTES BEFORE FIRST FOOD, BEVERAGE OR MEDICATION OF THE DAY     • alendronate (Fosamax) 70 mg tablet Take 1 tablet by mouth Once a week     • ARIPiprazole (ABILIFY) 10 mg tablet Take 1 tablet (10 mg total) by mouth daily 90 tablet 0   • aspirin (Aspirin 81) 81 mg chewable tablet Chew     • aspirin (ECOTRIN LOW STRENGTH) 81 mg EC tablet Take 1 tablet by mouth Daily     • atorvastatin (LIPITOR) 20 mg tablet TAKE 1 TABLET BY MOUTH EVERY DAY WITH DINNER 30 tablet 0   • bacitracin topical ointment 500 units/g topical ointment Apply 1 large application topically 2 (two) times a day 1 large application topically twice a day to PEG tube area 15 g 0   • buPROPion (WELLBUTRIN XL) 150 mg 24 hr tablet Take 1 tablet (150 mg total) by mouth every morning With a 300mg tab 90 tablet 0   • buPROPion (Wellbutrin XL) 300 mg 24 hr tablet Take 1 tablet (300 mg total) by mouth every morning With a 150 mg tab 90 tablet 0   • carvedilol (COREG) 3 125 mg tablet Take 1 tablet (3 125 mg total) by mouth 2 (two) times a day with meals 30 tablet 0   • carvedilol (COREG) 6 25 mg tablet Take 6 25 mg by mouth 2 (two) times a day     • cholecalciferol (VITAMIN D) 400 units/1 mL Take by mouth     • CVS B-12 500 MCG tablet TAKE 1 TABLET BY MOUTH DAILY 30 tablet 0   • FLUoxetine (PROzac) 40 MG capsule Take 2 capsules in th evening 180 capsule 0   • gabapentin (NEURONTIN) 100 mg capsule TAKE 2 CAPSULES BY MOUTH 3 TIMES A DAY 90 capsule 1   • hydrocortisone (ANUSOL-HC) 25 mg suppository Insert 1 suppository (25 mg total) into the rectum 2 (two) times a day 12 suppository 0   • hydrocortisone 1 % cream Apply topically 3 (three) times a day 30 g 0   • immune globulin, human 20 GM/200ML      • levothyroxine 88 mcg tablet Take 1 tablet (88 mcg total) by mouth daily in the early morning 30 tablet 0   • meloxicam (MOBIC) 15 mg tablet Take 1 tablet by mouth Daily     • pantoprazole (PROTONIX) 40 mg tablet Take 1 tablet (40 mg total) by mouth daily in the early morning 30 tablet 0   • Potassium Chloride ER 20 MEQ TBCR Take 1 tablet by mouth daily With food     • saccharomyces boulardii (FLORASTOR) 250 mg capsule Take 1 capsule (250 mg total) by mouth 2 (two) times a day 60 capsule 0   • sulfaSALAzine (AZULFIDINE) 500 mg tablet        No current facility-administered medications for this visit  No Known Allergies    Review of Systems   Constitutional: Positive for activity change (less active)  Negative for appetite change  Neurological:        Neuropathy     Psychiatric/Behavioral: Positive for dysphoric mood (baseline)  Negative for sleep disturbance and suicidal ideas  The patient is not nervous/anxious  Video Exam    There were no vitals filed for this visit  Physical Exam  Constitutional:       Appearance: Normal appearance  He is normal weight  Neurological:      Mental Status: He is alert  Psychiatric:         Attention and Perception: Attention and perception normal          Mood and Affect: Mood is depressed  Affect is flat  Behavior: Behavior normal  Behavior is cooperative  Thought Content:  Thought content normal          Judgment: Judgment normal       Comments: Slow monotone speech - baseline           Visit Time    Visit Start Time: 3 57 pm   Visit Stop Time: 4 05 pm   Total Visit Duration: 15 minutes

## 2023-09-11 ENCOUNTER — TELEMEDICINE (OUTPATIENT)
Dept: PSYCHIATRY | Facility: CLINIC | Age: 73
End: 2023-09-11
Payer: MEDICARE

## 2023-09-11 DIAGNOSIS — F41.1 GENERALIZED ANXIETY DISORDER: ICD-10-CM

## 2023-09-11 DIAGNOSIS — F33.1 MAJOR DEPRESSIVE DISORDER, RECURRENT, MODERATE (HCC): Primary | ICD-10-CM

## 2023-09-11 PROCEDURE — 99214 OFFICE O/P EST MOD 30 MIN: CPT | Performed by: PSYCHIATRY & NEUROLOGY

## 2023-09-11 RX ORDER — FLUOXETINE HYDROCHLORIDE 40 MG/1
CAPSULE ORAL
Qty: 180 CAPSULE | Refills: 0 | Status: SHIPPED | OUTPATIENT
Start: 2023-09-11

## 2023-09-11 RX ORDER — BUPROPION HYDROCHLORIDE 300 MG/1
300 TABLET ORAL EVERY MORNING
Qty: 90 TABLET | Refills: 0 | Status: SHIPPED | OUTPATIENT
Start: 2023-09-11

## 2023-09-11 RX ORDER — BUPROPION HYDROCHLORIDE 150 MG/1
150 TABLET ORAL EVERY MORNING
Qty: 90 TABLET | Refills: 0 | Status: SHIPPED | OUTPATIENT
Start: 2023-09-11

## 2023-09-11 RX ORDER — ARIPIPRAZOLE 10 MG/1
10 TABLET ORAL DAILY
Qty: 90 TABLET | Refills: 0 | Status: SHIPPED | OUTPATIENT
Start: 2023-09-11

## 2023-09-11 NOTE — PSYCH
Virtual Regular Visit    Verification of patient location:    Patient is located at Home in the following state in which I hold an active license [place]      Assessment/Plan:    Problem List Items Addressed This Visit    None      Goals addressed in session: Goal 1          Reason for visit is   Chief Complaint   Patient presents with   • Medication Management        Encounter provider Tam Grullon MD    Provider located at [address] [place] [address],[address]  [address]  [phone]      Recent Visits  No visits were found meeting these conditions. Showing recent visits within past 7 days and meeting all other requirements  Today's Visits  Date Type Provider Dept   09/11/23 Telemedicine Tam Grullon, [address] today's visits and meeting all other requirements  Future Appointments  No visits were found meeting these conditions. Showing future appointments within next 150 days and meeting all other requirements       The patient was identified by name and date of birth. Beatrice Bell was informed that this is a telemedicine visit and that the visit is being conducted [employer]. He agrees to proceed. .  My office door was closed. No one else was in the room. He acknowledged consent and understanding of privacy and security of the video platform. The patient has agreed to participate and understands they can discontinue the visit at any time. Patient is aware this is a billable service.      Subjective  Beatrice Bell is a 81 y.o. male with depression and anxiety presents for regular f/u  compliant with meds, denies SE  S/p fall; broken shoulder 2 weeks ago - no surgery  CIDP - falls 1 x week; regular neurology f/u   Uses cane now ( walker before ); limited functioning b/o shoulder      HPI   Mood - reports as " same"; mild baseline depression and less ADLs; not very active b/o multiple medical problems  Anxiety - controlled; denies panic attacks or racing thoughts      Past Medical History:   Diagnosis Date   • CIDP (chronic inflammatory demyelinating polyneuropathy) ([facility])    • Crohn disease ([address])    • Degenerative disc disease, lumbar    • Disease of thyroid gland    • GERD (gastroesophageal reflux disease)    • Myocardial infarction [facility])    • Psychiatric disorder        Past Surgical History:   Procedure Laterality Date   • LAPAROSCOPIC APPENDECTOMY     • PEG TUBE PLACEMENT     • PROSTATECTOMY         Current Outpatient Medications   Medication Sig Dispense Refill   • alendronate (FOSAMAX) 70 mg tablet TAKE 1 TABLET BY MOUTH ONCE WEEKLY IN THE MORNING AT LEAST 30 MINUTES BEFORE FIRST FOOD, BEVERAGE OR MEDICATION OF THE DAY     • alendronate (Fosamax) 70 mg tablet Take 1 tablet by mouth Once a week     • ARIPiprazole (ABILIFY) 10 mg tablet Take 1 tablet (10 mg total) by mouth daily 90 tablet 0   • aspirin (Aspirin 81) 81 mg chewable tablet Chew     • aspirin (ECOTRIN LOW STRENGTH) 81 mg EC tablet Take 1 tablet by mouth Daily     • atorvastatin (LIPITOR) 20 mg tablet TAKE 1 TABLET BY MOUTH EVERY DAY WITH DINNER 30 tablet 0   • bacitracin topical ointment 500 units/g topical ointment Apply 1 large application topically 2 (two) times a day 1 large application topically twice a day to PEG tube area 15 g 0   • buPROPion (WELLBUTRIN XL) 150 mg 24 hr tablet Take 1 tablet (150 mg total) by mouth every morning With a 300mg tab 90 tablet 0   • buPROPion (Wellbutrin XL) 300 mg 24 hr tablet Take 1 tablet (300 mg total) by mouth every morning With a 150 mg tab 90 tablet 0   • Calcium Carb-Cholecalciferol (Calcium-Vitamin D3) 600-10 MG-MCG CAPS every 24 hours     • carvedilol (COREG) 3.125 mg tablet Take 1 tablet (3.125 mg total) by mouth 2 (two) times a day with meals 30 tablet 0   • carvedilol (COREG) 6.25 mg tablet Take 6.25 mg by mouth 2 (two) times a day     • cholecalciferol (VITAMIN D) 400 units/1 mL Take by mouth     • CVS B-12 500 MCG tablet TAKE 1 TABLET BY MOUTH DAILY 30 tablet 0   • FLUoxetine (PROzac) 40 MG capsule Take 2 capsules in th evening 180 capsule 0   • gabapentin (NEURONTIN) 100 mg capsule TAKE 2 CAPSULES BY MOUTH 3 TIMES A DAY 90 capsule 1   • hydrocortisone (ANUSOL-HC) 25 mg suppository Insert 1 suppository (25 mg total) into the rectum 2 (two) times a day 12 suppository 0   • hydrocortisone 1 % cream Apply topically 3 (three) times a day 30 g 0   • immune globulin, human 20 GM/200ML      • levothyroxine 88 mcg tablet Take 1 tablet (88 mcg total) by mouth daily in the early morning 30 tablet 0   • meloxicam (MOBIC) 15 mg tablet Take 1 tablet by mouth Daily     • pantoprazole (PROTONIX) 40 mg tablet Take 1 tablet (40 mg total) by mouth daily in the early morning 30 tablet 0   • Potassium Chloride ER 20 MEQ TBCR Take 1 tablet by mouth daily With food     • saccharomyces boulardii (FLORASTOR) 250 mg capsule Take 1 capsule (250 mg total) by mouth 2 (two) times a day 60 capsule 0   • sulfaSALAzine (AZULFIDINE) 500 mg tablet        No current facility-administered medications for this visit. No Known Allergies    Review of Systems   Constitutional: Positive for activity change (less active). Negative for appetite change. Musculoskeletal:        Broken shoulder   Psychiatric/Behavioral: Negative for dysphoric mood, sleep disturbance and suicidal ideas. The patient is not nervous/anxious. Video Exam    There were no vitals filed for this visit. Physical Exam  Constitutional:       Appearance: Normal appearance. He is normal weight. Neurological:      Mental Status: He is alert. Psychiatric:         Attention and Perception: Attention and perception normal.         Mood and Affect: Mood is depressed. Affect is blunt. Behavior: Behavior normal. Behavior is cooperative. Thought Content:  Thought content normal.         Judgment: Judgment normal.      Comments: Monotone slow speech at baseline          Visit Time    Visit Start Time: 2.18 pm   Visit Stop Time: 2.23 pm   Total Visit Duration: 15 minutes

## 2023-12-04 ENCOUNTER — TELEMEDICINE (OUTPATIENT)
Dept: PSYCHIATRY | Facility: CLINIC | Age: 73
End: 2023-12-04
Payer: MEDICARE

## 2023-12-04 DIAGNOSIS — F33.1 MAJOR DEPRESSIVE DISORDER, RECURRENT, MODERATE (HCC): ICD-10-CM

## 2023-12-04 DIAGNOSIS — F41.1 GENERALIZED ANXIETY DISORDER: Primary | ICD-10-CM

## 2023-12-04 PROCEDURE — 99214 OFFICE O/P EST MOD 30 MIN: CPT | Performed by: PSYCHIATRY & NEUROLOGY

## 2023-12-04 RX ORDER — ARIPIPRAZOLE 10 MG/1
10 TABLET ORAL DAILY
Qty: 90 TABLET | Refills: 0 | Status: SHIPPED | OUTPATIENT
Start: 2023-12-04

## 2023-12-04 RX ORDER — BUPROPION HYDROCHLORIDE 150 MG/1
150 TABLET ORAL EVERY MORNING
Qty: 90 TABLET | Refills: 0 | Status: SHIPPED | OUTPATIENT
Start: 2023-12-04

## 2023-12-04 RX ORDER — FLUOXETINE HYDROCHLORIDE 40 MG/1
CAPSULE ORAL
Qty: 180 CAPSULE | Refills: 0 | Status: SHIPPED | OUTPATIENT
Start: 2023-12-04

## 2023-12-04 RX ORDER — BUPROPION HYDROCHLORIDE 300 MG/1
300 TABLET ORAL EVERY MORNING
Qty: 90 TABLET | Refills: 0 | Status: SHIPPED | OUTPATIENT
Start: 2023-12-04

## 2023-12-04 NOTE — PSYCH
Virtual Regular Visit    Verification of patient location:    Patient is located at Home in the following state in which I hold an active license [place]      Assessment/Plan:    Problem List Items Addressed This Visit    None      Goals addressed in session: Goal 1          Reason for visit is   Chief Complaint   Patient presents with    Medication Management        Encounter provider Sanchez Wilson MD    Provider located at [address] [place] [address],[address]  [facility] [address] [place], [place] [id number]      Recent Visits  No visits were found meeting these conditions. Showing recent visits within past 7 days and meeting all other requirements  Today's Visits  Date Type Provider Dept   12/04/23 Telemedicine Sanchez Wilson, [address] today's visits and meeting all other requirements  Future Appointments  No visits were found meeting these conditions. Showing future appointments within next 150 days and meeting all other requirements       The patient was identified by name and date of birth. Umberto Rodriguez was informed that this is a telemedicine visit and that the visit is being conducted [employer]. He agrees to proceed. .  My office door was closed. No one else was in the room. He acknowledged consent and understanding of privacy and security of the video platform. The patient has agreed to participate and understands they can discontinue the visit at any time. Patient is aware this is a billable service. Subjective  Umberto Rodriguez is a 81 y.o. male with depression and anxiety presents for regular f/u  .   Compliant with meds, denies SE  Recent f/u with neurologist and rheumatologist  Shoulder pain - improving; no recent falls    HPI   Mood - reports as " not depressed"; does ADLs; still limited functioning; spends time watching TV  Likes to be with family  Anxiety - controlled; denies racing thoughts or panic attacks  Past Medical History:   Diagnosis Date    CIDP (chronic inflammatory demyelinating polyneuropathy) (HCC)     Crohn disease (HCC)     Degenerative disc disease, lumbar     Disease of thyroid gland     GERD (gastroesophageal reflux disease)     Myocardial infarction [facility])     Psychiatric disorder        Past Surgical History:   Procedure Laterality Date    LAPAROSCOPIC APPENDECTOMY      PEG TUBE PLACEMENT      PROSTATECTOMY         Current Outpatient Medications   Medication Sig Dispense Refill    ARIPiprazole (ABILIFY) 10 mg tablet Take 1 tablet (10 mg total) by mouth daily 90 tablet 0    buPROPion (WELLBUTRIN XL) 150 mg 24 hr tablet Take 1 tablet (150 mg total) by mouth every morning With a 300mg tab 90 tablet 0    buPROPion (Wellbutrin XL) 300 mg 24 hr tablet Take 1 tablet (300 mg total) by mouth every morning With a 150 mg tab 90 tablet 0    FLUoxetine (PROzac) 40 MG capsule Take 2 capsules in th evening 180 capsule 0    alendronate (FOSAMAX) 70 mg tablet TAKE 1 TABLET BY MOUTH ONCE WEEKLY IN THE MORNING AT LEAST 30 MINUTES BEFORE FIRST FOOD, BEVERAGE OR MEDICATION OF THE DAY      alendronate (Fosamax) 70 mg tablet Take 1 tablet by mouth Once a week      aspirin (Aspirin 81) 81 mg chewable tablet Chew      aspirin (ECOTRIN LOW STRENGTH) 81 mg EC tablet Take 1 tablet by mouth Daily      atorvastatin (LIPITOR) 20 mg tablet TAKE 1 TABLET BY MOUTH EVERY DAY WITH DINNER 30 tablet 0    bacitracin topical ointment 500 units/g topical ointment Apply 1 large application topically 2 (two) times a day 1 large application topically twice a day to PEG tube area 15 g 0    Calcium Carb-Cholecalciferol (Calcium-Vitamin D3) 600-10 MG-MCG CAPS every 24 hours      carvedilol (COREG) 3.125 mg tablet Take 1 tablet (3.125 mg total) by mouth 2 (two) times a day with meals 30 tablet 0    carvedilol (COREG) 6.25 mg tablet Take 6.25 mg by mouth 2 (two) times a day      cholecalciferol (VITAMIN D) 400 units/1 mL Take by mouth [facility] B-12 500 MCG tablet TAKE 1 TABLET BY MOUTH DAILY 30 tablet 0    gabapentin (NEURONTIN) 100 mg capsule TAKE 2 CAPSULES BY MOUTH 3 TIMES A DAY 90 capsule 1    hydrocortisone (ANUSOL-HC) 25 mg suppository Insert 1 suppository (25 mg total) into the rectum 2 (two) times a day 12 suppository 0    hydrocortisone 1 % cream Apply topically 3 (three) times a day 30 g 0    immune globulin, human 20 GM/200ML       levothyroxine 88 mcg tablet Take 1 tablet (88 mcg total) by mouth daily in the early morning 30 tablet 0    meloxicam (MOBIC) 15 mg tablet Take 1 tablet by mouth Daily      pantoprazole (PROTONIX) 40 mg tablet Take 1 tablet (40 mg total) by mouth daily in the early morning 30 tablet 0    Potassium Chloride ER 20 MEQ TBCR Take 1 tablet by mouth daily With food      saccharomyces boulardii (FLORASTOR) 250 mg capsule Take 1 capsule (250 mg total) by mouth 2 (two) times a day 60 capsule 0    sulfaSALAzine (AZULFIDINE) 500 mg tablet        No current facility-administered medications for this visit. No Known Allergies    Review of Systems   Constitutional:  Negative for activity change and appetite change. Psychiatric/Behavioral:  Negative for dysphoric mood, sleep disturbance and suicidal ideas. The patient is not nervous/anxious. Video Exam    There were no vitals filed for this visit. Physical Exam  Constitutional:       Appearance: Normal appearance. He is normal weight. Neurological:      Mental Status: He is alert. Psychiatric:         Attention and Perception: Attention and perception normal.         Mood and Affect: Mood normal. Affect is blunt. Speech: Speech normal.         Behavior: Behavior normal. Behavior is cooperative. Thought Content:  Thought content normal.         Judgment: Judgment normal.          Visit Time    Visit Start Time: 2.56 pm   Visit Stop Time: 3.03 pm   Total Visit Duration:  15 minutes    TREATMENT PLAN (Medication Management Only)        . [facility]    Name and Date of Birth:  Ashish Avila 81 y.o. 1950  Date of Treatment Plan: December 4, 2023  Diagnosis/Diagnoses:  No diagnosis found. Strengths/Personal Resources for Self-Care: taking medications as prescribed. Area/Areas of need (in own words): depression  1. Long Term Goal: maintain control of depression. Target Date:6 months - 6/4/2024  Person/Persons responsible for completion of goal: Anibal  2. Short Term Objective (s) - How will we reach this goal?:   A. Provider new recommended medication/dosage changes and/or continue medication(s): continue current medications as prescribed Prozac, Wellbutrin XL, Abilify. B. N/A.  C. N/A. Target Date:6 months - 6/4/2024  Person/Persons Responsible for Completion of Goal: Anibal  Progress Towards Goals: continuing treatment  Treatment Modality: medication management every 3 months  Review due 180 days from date of this plan: 6 months - 6/4/2024  Expected length of service: ongoing treatment  My Physician/PA/NP and I have developed this plan together and I agree to work on the goals and objectives. I understand the treatment goals that were developed for my treatment.

## 2024-02-19 ENCOUNTER — TELEMEDICINE (OUTPATIENT)
Dept: PSYCHIATRY | Facility: CLINIC | Age: 74
End: 2024-02-19
Payer: MEDICARE

## 2024-02-19 DIAGNOSIS — F33.1 MAJOR DEPRESSIVE DISORDER, RECURRENT, MODERATE (HCC): ICD-10-CM

## 2024-02-19 DIAGNOSIS — F41.1 GENERALIZED ANXIETY DISORDER: Primary | ICD-10-CM

## 2024-02-19 PROCEDURE — 99214 OFFICE O/P EST MOD 30 MIN: CPT | Performed by: PSYCHIATRY & NEUROLOGY

## 2024-02-19 RX ORDER — ARIPIPRAZOLE 10 MG/1
10 TABLET ORAL DAILY
Qty: 90 TABLET | Refills: 0 | Status: SHIPPED | OUTPATIENT
Start: 2024-02-19

## 2024-02-19 RX ORDER — BUPROPION HYDROCHLORIDE 300 MG/1
300 TABLET ORAL EVERY MORNING
Qty: 90 TABLET | Refills: 0 | Status: SHIPPED | OUTPATIENT
Start: 2024-02-19

## 2024-02-19 RX ORDER — FLUOXETINE HYDROCHLORIDE 40 MG/1
CAPSULE ORAL
Qty: 180 CAPSULE | Refills: 0 | Status: SHIPPED | OUTPATIENT
Start: 2024-02-19

## 2024-02-19 RX ORDER — BUPROPION HYDROCHLORIDE 150 MG/1
150 TABLET ORAL EVERY MORNING
Qty: 90 TABLET | Refills: 0 | Status: SHIPPED | OUTPATIENT
Start: 2024-02-19

## 2024-02-19 NOTE — PSYCH
"  Virtual Regular Visit    Verification of patient location:    Patient is located at Home in the following state in which I hold an active license [place]      Assessment/Plan:    Problem List Items Addressed This Visit    None      Goals addressed in session: Goal 1          Reason for visit is   Chief Complaint   Patient presents with    Medication Management        Encounter provider Jaqui Barker MD    Provider located at [facility]  [address]  [facility] [id number] [phone]      Recent Visits  No visits were found meeting these conditions.  Showing recent visits within past 7 days and meeting all other requirements  Today's Visits  Date Type Provider Dept   02/19/24 Telemedicine Jaqui Barker MD [facility]   Showing today's visits and meeting all other requirements  Future Appointments  No visits were found meeting these conditions.  Showing future appointments within next 150 days and meeting all other requirements       The patient was identified by name and date of birth. Anibal Quarles was informed that this is a telemedicine visit and that the visit is being conducted throughthe Epic Embedded platform. He agrees to proceed..  My office door was closed. No one else was in the room.  He acknowledged consent and understanding of privacy and security of the video platform. The patient has agreed to participate and understands they can discontinue the visit at any time.    Patient is aware this is a billable service.     Subjective  Anibal Quarles is a 73 y.o. male with depression presents for regular f/u  .  Compliant with meds, denies SE  S/p fall a month ago - 2 broken ribs - healing, but still some pain. In PT   Pt stays inside most of the time, watching TV; walks with a walker; sometimes gets outside  Limited functioning ( medical problems)  Daughter is at home with him      HPI   Mood - \" same\"; denies feeling depressed, " baseline low energy and limited functioning  Anxiety - worries sometimes; denies panic attacks  Psychosis - denies delusions or intrusive thoughts  Past Medical History:   Diagnosis Date    CIDP (chronic inflammatory demyelinating polyneuropathy) (HCC)     Crohn disease (HCC)     Degenerative disc disease, lumbar     Disease of thyroid gland     GERD (gastroesophageal reflux disease)     Myocardial infarction (HCC)     Psychiatric disorder        Past Surgical History:   Procedure Laterality Date    LAPAROSCOPIC APPENDECTOMY      PEG TUBE PLACEMENT      PROSTATECTOMY         Current Outpatient Medications   Medication Sig Dispense Refill    ARIPiprazole (ABILIFY) 10 mg tablet Take 1 tablet (10 mg total) by mouth daily 90 tablet 0    buPROPion (WELLBUTRIN XL) 150 mg 24 hr tablet Take 1 tablet (150 mg total) by mouth every morning With a 300mg tab 90 tablet 0    buPROPion (Wellbutrin XL) 300 mg 24 hr tablet Take 1 tablet (300 mg total) by mouth every morning With a 150 mg tab 90 tablet 0    FLUoxetine (PROzac) 40 MG capsule Take 2 capsules in th evening 180 capsule 0    alendronate (FOSAMAX) 70 mg tablet TAKE 1 TABLET BY MOUTH ONCE WEEKLY IN THE MORNING AT LEAST 30 MINUTES BEFORE FIRST FOOD, BEVERAGE OR MEDICATION OF THE DAY      alendronate (Fosamax) 70 mg tablet Take 1 tablet by mouth Once a week      aspirin (Aspirin 81) 81 mg chewable tablet Chew      aspirin (ECOTRIN LOW STRENGTH) 81 mg EC tablet Take 1 tablet by mouth Daily      atorvastatin (LIPITOR) 20 mg tablet TAKE 1 TABLET BY MOUTH EVERY DAY WITH DINNER 30 tablet 0    bacitracin topical ointment 500 units/g topical ointment Apply 1 large application topically 2 (two) times a day 1 large application topically twice a day to PEG tube area 15 g 0    Calcium Carb-Cholecalciferol (Calcium-Vitamin D3) 600-10 MG-MCG CAPS every 24 hours      carvedilol (COREG) 3.125 mg tablet Take 1 tablet (3.125 mg total) by mouth 2 (two) times a day with meals 30 tablet 0     carvedilol (COREG) 6.25 mg tablet Take 6.25 mg by mouth 2 (two) times a day      cholecalciferol (VITAMIN D) 400 units/1 mL Take by mouth      [facility]S B-12 500 MCG tablet TAKE 1 TABLET BY MOUTH DAILY 30 tablet 0    gabapentin (NEURONTIN) 100 mg capsule TAKE 2 CAPSULES BY MOUTH 3 TIMES A DAY 90 capsule 1    hydrocortisone (ANUSOL-HC) 25 mg suppository Insert 1 suppository (25 mg total) into the rectum 2 (two) times a day 12 suppository 0    hydrocortisone 1 % cream Apply topically 3 (three) times a day 30 g 0    immune globulin, human 20 GM/200ML       levothyroxine 88 mcg tablet Take 1 tablet (88 mcg total) by mouth daily in the early morning 30 tablet 0    meloxicam (MOBIC) 15 mg tablet Take 1 tablet by mouth Daily      pantoprazole (PROTONIX) 40 mg tablet Take 1 tablet (40 mg total) by mouth daily in the early morning 30 tablet 0    Potassium Chloride ER 20 MEQ TBCR Take 1 tablet by mouth daily With food      saccharomyces boulardii (FLORASTOR) 250 mg capsule Take 1 capsule (250 mg total) by mouth 2 (two) times a day 60 capsule 0    sulfaSALAzine (AZULFIDINE) 500 mg tablet        No current facility-administered medications for this visit.        No Known Allergies    Review of Systems   Constitutional:  Negative for activity change and appetite change.   Psychiatric/Behavioral:  Negative for dysphoric mood, sleep disturbance and suicidal ideas. The patient is not nervous/anxious.        Video Exam    There were no vitals filed for this visit.    Physical Exam  Constitutional:       Appearance: Normal appearance. He is normal weight.   Neurological:      Mental Status: He is alert.   Psychiatric:         Attention and Perception: Attention and perception normal.         Mood and Affect: Mood normal. Affect is blunt.         Behavior: Behavior normal. Behavior is cooperative.         Thought Content: Thought content normal.         Judgment: Judgment normal.      Comments: Slow monotone speech at baseline           Visit Time    Visit Start Time: 2.57 pm   Visit Stop Time: 3.04 pm   Total Visit Duration:  20 minutes

## 2024-04-09 ENCOUNTER — HOSPITAL ENCOUNTER (EMERGENCY)
Dept: HOSPITAL 99 - EMR | Age: 74
Discharge: HOME | End: 2024-04-09
Payer: MEDICARE

## 2024-04-09 VITALS — DIASTOLIC BLOOD PRESSURE: 80 MMHG | RESPIRATION RATE: 14 BRPM | SYSTOLIC BLOOD PRESSURE: 125 MMHG

## 2024-04-09 VITALS — DIASTOLIC BLOOD PRESSURE: 82 MMHG | SYSTOLIC BLOOD PRESSURE: 133 MMHG

## 2024-04-09 DIAGNOSIS — Z87.891: ICD-10-CM

## 2024-04-09 DIAGNOSIS — Z86.711: ICD-10-CM

## 2024-04-09 DIAGNOSIS — I25.10: ICD-10-CM
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
## 2024-04-09 DIAGNOSIS — I48.91: ICD-10-CM

## 2024-04-09 DIAGNOSIS — I25.2: ICD-10-CM

## 2024-04-09 DIAGNOSIS — F41.9: ICD-10-CM

## 2024-04-09 DIAGNOSIS — S01.01XA: Primary | ICD-10-CM

## 2024-04-09 DIAGNOSIS — Z85.9: ICD-10-CM

## 2024-04-09 DIAGNOSIS — E03.9: ICD-10-CM

## 2024-04-09 DIAGNOSIS — K52.9: ICD-10-CM

## 2024-04-09 DIAGNOSIS — Z86.19: ICD-10-CM

## 2024-04-09 DIAGNOSIS — I42.9: ICD-10-CM

## 2024-04-09 DIAGNOSIS — F32.A: ICD-10-CM

## 2024-04-09 DIAGNOSIS — Z90.79: ICD-10-CM

## 2024-04-09 DIAGNOSIS — K50.90: ICD-10-CM

## 2024-04-09 DIAGNOSIS — E78.00: ICD-10-CM

## 2024-04-09 DIAGNOSIS — R29.6: ICD-10-CM

## 2024-04-09 PROCEDURE — 99284 EMERGENCY DEPT VISIT MOD MDM: CPT

## 2024-05-13 ENCOUNTER — TELEPHONE (OUTPATIENT)
Dept: PSYCHIATRY | Facility: CLINIC | Age: 74
End: 2024-05-13

## 2024-05-13 NOTE — TELEPHONE ENCOUNTER
Writer left message for client to confirm 5/15/24 appointment. Writer also informed client that the [facility] care consent form and MSPQ are due for the appointment.

## 2024-05-15 ENCOUNTER — TELEMEDICINE (OUTPATIENT)
Dept: PSYCHIATRY | Facility: CLINIC | Age: 74
End: 2024-05-15
Payer: MEDICARE

## 2024-05-15 DIAGNOSIS — F41.1 GENERALIZED ANXIETY DISORDER: Primary | ICD-10-CM

## 2024-05-15 DIAGNOSIS — F33.1 MAJOR DEPRESSIVE DISORDER, RECURRENT, MODERATE (HCC): ICD-10-CM

## 2024-05-15 PROCEDURE — 99214 OFFICE O/P EST MOD 30 MIN: CPT | Performed by: PSYCHIATRY & NEUROLOGY

## 2024-05-15 RX ORDER — BUPROPION HYDROCHLORIDE 150 MG/1
150 TABLET ORAL EVERY MORNING
Qty: 90 TABLET | Refills: 0 | Status: SHIPPED | OUTPATIENT
Start: 2024-05-15

## 2024-05-15 RX ORDER — FLUOXETINE HYDROCHLORIDE 40 MG/1
CAPSULE ORAL
Qty: 180 CAPSULE | Refills: 0 | Status: SHIPPED | OUTPATIENT
Start: 2024-05-15

## 2024-05-15 RX ORDER — BUPROPION HYDROCHLORIDE 300 MG/1
300 TABLET ORAL EVERY MORNING
Qty: 90 TABLET | Refills: 0 | Status: SHIPPED | OUTPATIENT
Start: 2024-05-15

## 2024-05-15 RX ORDER — ARIPIPRAZOLE 10 MG/1
10 TABLET ORAL DAILY
Qty: 90 TABLET | Refills: 0 | Status: SHIPPED | OUTPATIENT
Start: 2024-05-15

## 2024-05-15 NOTE — PSYCH
"  Virtual Regular Visit    Verification of patient location:    Patient is located at Home in the following state in which I hold an active license [place]      Assessment/Plan:    Problem List Items Addressed This Visit    None      Goals addressed in session: Goal 1          Reason for visit is   Chief Complaint   Patient presents with    Medication Management        Encounter provider Jaqui Barker MD      Recent Visits  Date Type Provider Dept   05/13/24 Telephone [employer] [facility]   Showing recent visits within past 7 days and meeting all other requirements  Today's Visits  Date Type Provider Dept   05/15/24 [facility]   Showing today's visits and meeting all other requirements  Future Appointments  No visits were found meeting these conditions.  Showing future appointments within next 150 days and meeting all other requirements       The patient was identified by name and date of birth. Anibal Quarles was informed that this is a telemedicine visit and that the visit is being conducted throughthe Epic Embedded platform. He agrees to proceed..  My office door was closed. No one else was in the room.  He acknowledged consent and understanding of privacy and security of the video platform. The patient has agreed to participate and understands they can discontinue the visit at any time.    Patient is aware this is a billable service.     Subjective  Anibal Quarles is a 73 y.o. male with depression and anxiety presents for regular f/u  .  Compliant with meds, denies SE  In PT 2 x week for poor balance - improving  Walks with a walker; stays mostly inside, watching TV  Social with family    HPI   Mood - \" same\"; not depressed, but low energy and motivation; stays mostly at home; limited functioning  Anxiety - controlled; denies panic attacks or racing thoughts  Psychosis - denies delusions or AH  Past Medical History:   Diagnosis Date    CIDP (chronic " inflammatory demyelinating polyneuropathy) (HCC)     Crohn disease (HCC)     Degenerative disc disease, lumbar     Disease of thyroid gland     GERD (gastroesophageal reflux disease)     Myocardial infarction (HCC)     Psychiatric disorder        Past Surgical History:   Procedure Laterality Date    LAPAROSCOPIC APPENDECTOMY      PEG TUBE PLACEMENT      PROSTATECTOMY         Current Outpatient Medications   Medication Sig Dispense Refill    ARIPiprazole (ABILIFY) 10 mg tablet Take 1 tablet (10 mg total) by mouth daily 90 tablet 0    buPROPion (WELLBUTRIN XL) 150 mg 24 hr tablet Take 1 tablet (150 mg total) by mouth every morning With a 300mg tab 90 tablet 0    buPROPion (Wellbutrin XL) 300 mg 24 hr tablet Take 1 tablet (300 mg total) by mouth every morning With a 150 mg tab 90 tablet 0    FLUoxetine (PROzac) 40 MG capsule Take 2 capsules in th evening 180 capsule 0    alendronate (FOSAMAX) 70 mg tablet TAKE 1 TABLET BY MOUTH ONCE WEEKLY IN THE MORNING AT LEAST 30 MINUTES BEFORE FIRST FOOD, BEVERAGE OR MEDICATION OF THE DAY      alendronate (Fosamax) 70 mg tablet Take 1 tablet by mouth Once a week      aspirin (Aspirin 81) 81 mg chewable tablet Chew      aspirin (ECOTRIN LOW STRENGTH) 81 mg EC tablet Take 1 tablet by mouth Daily      atorvastatin (LIPITOR) 20 mg tablet TAKE 1 TABLET BY MOUTH EVERY DAY WITH DINNER 30 tablet 0    bacitracin topical ointment 500 units/g topical ointment Apply 1 large application topically 2 (two) times a day 1 large application topically twice a day to PEG tube area 15 g 0    Calcium Carb-Cholecalciferol (Calcium-Vitamin D3) 600-10 MG-MCG CAPS every 24 hours      carvedilol (COREG) 3.125 mg tablet Take 1 tablet (3.125 mg total) by mouth 2 (two) times a day with meals 30 tablet 0    carvedilol (COREG) 6.25 mg tablet Take 6.25 mg by mouth 2 (two) times a day      cholecalciferol (VITAMIN D) 400 units/1 mL Take by mouth      [facility]S B-12 500 MCG tablet TAKE 1 TABLET BY MOUTH DAILY 30 tablet 0     gabapentin (NEURONTIN) 100 mg capsule TAKE 2 CAPSULES BY MOUTH 3 TIMES A DAY 90 capsule 1    hydrocortisone (ANUSOL-HC) 25 mg suppository Insert 1 suppository (25 mg total) into the rectum 2 (two) times a day 12 suppository 0    hydrocortisone 1 % cream Apply topically 3 (three) times a day 30 g 0    immune globulin, human 20 GM/200ML       levothyroxine 88 mcg tablet Take 1 tablet (88 mcg total) by mouth daily in the early morning 30 tablet 0    meloxicam (MOBIC) 15 mg tablet Take 1 tablet by mouth Daily      pantoprazole (PROTONIX) 40 mg tablet Take 1 tablet (40 mg total) by mouth daily in the early morning 30 tablet 0    Potassium Chloride ER 20 MEQ TBCR Take 1 tablet by mouth daily With food      saccharomyces boulardii (FLORASTOR) 250 mg capsule Take 1 capsule (250 mg total) by mouth 2 (two) times a day 60 capsule 0    sulfaSALAzine (AZULFIDINE) 500 mg tablet        No current facility-administered medications for this visit.        No Known Allergies    Review of Systems   Constitutional:  Negative for activity change and appetite change.   Psychiatric/Behavioral:  Negative for dysphoric mood, sleep disturbance and suicidal ideas. The patient is not nervous/anxious.        Video Exam    There were no vitals filed for this visit.    Physical Exam  Constitutional:       Appearance: Normal appearance. He is normal weight.   Neurological:      Mental Status: He is alert.   Psychiatric:         Attention and Perception: Attention and perception normal.         Mood and Affect: Mood normal. Affect is blunt.         Behavior: Behavior normal. Behavior is cooperative.         Thought Content: Thought content normal.         Judgment: Judgment normal.      Comments: Monotone slow speech, not very talkative - baseline          Visit Time    Visit Start Time: 1.58 pm   Visit Stop Time: 2.05 pm   Total Visit Duration:  20 minutes      TREATMENT PLAN (Medication Management Only)        [facility]  [facility]    Name and Date of Birth:  Anibal Quarles 73 y.o. 1950  Date of Treatment Plan: May 15, 2024  Diagnosis/Diagnoses:  No diagnosis found.  Strengths/Personal Resources for Self-Care: supportive family, taking medications as prescribed.  Area/Areas of need (in own words): anxiety, depression  1. Long Term Goal: maintain control of depression.  Target Date: 6 months - 11/15/24   Person/Persons responsible for completion of goal: Anibal  2.  Short Term Objective (s) - How will we reach this goal?:   A. Provider new recommended medication/dosage changes and/or continue medication(s): continue current medications as prescribed Wellbutrin XL, Abilify.  B. N/A.  C. N/A.  Target Date:6 months - 11/15/2024  Person/Persons Responsible for Completion of Goal: Anibal  Progress Towards Goals: stable  Treatment Modality: medication management every 3 months  Review due 180 days from date of this plan: 6 months - 11/15/2024  Expected length of service: ongoing treatment  My Physician/PA/NP and I have developed this plan together and I agree to work on the goals and objectives. I understand the treatment goals that were developed for my treatment.

## 2024-06-18 ENCOUNTER — HOSPITAL ENCOUNTER (OUTPATIENT)
Dept: HOSPITAL 99 - RST | Age: 74
Discharge: HOME | End: 2024-06-18
Payer: MEDICARE

## 2024-06-18 DIAGNOSIS — R49.0: Primary | ICD-10-CM

## 2024-06-18 DIAGNOSIS — R13.10: ICD-10-CM

## 2024-07-25 ENCOUNTER — HOSPITAL ENCOUNTER (OUTPATIENT)
Dept: HOSPITAL 99 - RST | Age: 74
Discharge: HOME | End: 2024-07-25
Payer: MEDICARE

## 2024-07-25 DIAGNOSIS — R49.0: Primary | ICD-10-CM

## 2024-07-25 DIAGNOSIS — R13.10: ICD-10-CM

## 2024-08-07 ENCOUNTER — TELEMEDICINE (OUTPATIENT)
Dept: PSYCHIATRY | Facility: CLINIC | Age: 74
End: 2024-08-07
Payer: MEDICARE

## 2024-08-07 DIAGNOSIS — F41.1 GENERALIZED ANXIETY DISORDER: ICD-10-CM

## 2024-08-07 DIAGNOSIS — F33.1 MAJOR DEPRESSIVE DISORDER, RECURRENT, MODERATE (HCC): Primary | ICD-10-CM

## 2024-08-07 PROCEDURE — 99214 OFFICE O/P EST MOD 30 MIN: CPT | Performed by: PSYCHIATRY & NEUROLOGY

## 2024-08-07 RX ORDER — ARIPIPRAZOLE 10 MG/1
10 TABLET ORAL DAILY
Qty: 90 TABLET | Refills: 0 | Status: SHIPPED | OUTPATIENT
Start: 2024-08-07

## 2024-08-07 RX ORDER — BUPROPION HYDROCHLORIDE 150 MG/1
150 TABLET ORAL EVERY MORNING
Qty: 90 TABLET | Refills: 0 | Status: SHIPPED | OUTPATIENT
Start: 2024-08-07

## 2024-08-07 RX ORDER — BUPROPION HYDROCHLORIDE 300 MG/1
300 TABLET ORAL EVERY MORNING
Qty: 90 TABLET | Refills: 0 | Status: SHIPPED | OUTPATIENT
Start: 2024-08-07

## 2024-08-07 RX ORDER — FLUOXETINE HYDROCHLORIDE 40 MG/1
CAPSULE ORAL
Qty: 180 CAPSULE | Refills: 0 | Status: SHIPPED | OUTPATIENT
Start: 2024-08-07

## 2024-08-07 NOTE — PSYCH
"  Virtual Regular Visit    Verification of patient location:    Patient is located at Home in the following state in which I hold an active license [place]      Assessment/Plan:    Problem List Items Addressed This Visit    None      Goals addressed in session: Goal 1          Reason for visit is   Chief Complaint   Patient presents with    Medication Management        Encounter provider Jaqui Barker MD      Recent Visits  No visits were found meeting these conditions.  Showing recent visits within past 7 days and meeting all other requirements  Today's Visits  Date Type Provider Dept   08/07/24 Telemedicine Jaqui Barker MD [facility]   Showing today's visits and meeting all other requirements  Future Appointments  No visits were found meeting these conditions.  Showing future appointments within next 150 days and meeting all other requirements       The patient was identified by name and date of birth. Anibal Quarles was informed that this is a telemedicine visit and that the visit is being conducted throughthe Epic Embedded platform. He agrees to proceed..  My office door was closed. No one else was in the room.  He acknowledged consent and understanding of privacy and security of the video platform. The patient has agreed to participate and understands they can discontinue the visit at any time.    Patient is aware this is a billable service.     Subjective  Anibal Quarles is a 73 y.o. male with depression and anxiety presents for regular f/u  .  Compliant with meds, denies SE  Multiple chronic medical problems - stable; on meds  S/p broken toe a week ago - wearing a boot  S/p falls, PT was not effective  Pt stays mostly at home, watching TV; limited functining    HPI   Mood - \" same\"; mild depression and low energy ( baseline, health issues); limited functining  Social with family  Anxiety - controlled; denies panic attacks or racing thoughts  Psychosis - denies AH or delusions  Past Medical History: "   Diagnosis Date    CIDP (chronic inflammatory demyelinating polyneuropathy) ([facility])     Crohn disease (HCC)     Degenerative disc disease, lumbar     Disease of thyroid gland     GERD (gastroesophageal reflux disease)     Myocardial infarction (HCC)     Psychiatric disorder        Past Surgical History:   Procedure Laterality Date    LAPAROSCOPIC APPENDECTOMY      PEG TUBE PLACEMENT      PROSTATECTOMY         Current Outpatient Medications   Medication Sig Dispense Refill    atorvastatin (LIPITOR) 20 mg tablet TAKE 1 TABLET BY MOUTH EVERY DAY WITH DINNER 30 tablet 0    carvedilol (COREG) 3.125 mg tablet Take 1 tablet (3.125 mg total) by mouth 2 (two) times a day with meals 30 tablet 0    FLUoxetine (PROzac) 40 MG capsule Take 2 capsules in th evening 180 capsule 0    levothyroxine 88 mcg tablet Take 1 tablet (88 mcg total) by mouth daily in the early morning 30 tablet 0    meloxicam (MOBIC) 15 mg tablet Take 1 tablet by mouth Daily      sulfaSALAzine (AZULFIDINE) 500 mg tablet       alendronate (FOSAMAX) 70 mg tablet TAKE 1 TABLET BY MOUTH ONCE WEEKLY IN THE MORNING AT LEAST 30 MINUTES BEFORE FIRST FOOD, BEVERAGE OR MEDICATION OF THE DAY      alendronate (Fosamax) 70 mg tablet Take 1 tablet by mouth Once a week      ARIPiprazole (ABILIFY) 10 mg tablet Take 1 tablet (10 mg total) by mouth daily 90 tablet 0    aspirin (Aspirin 81) 81 mg chewable tablet Chew      aspirin (ECOTRIN LOW STRENGTH) 81 mg EC tablet Take 1 tablet by mouth Daily      bacitracin topical ointment 500 units/g topical ointment Apply 1 large application topically 2 (two) times a day 1 large application topically twice a day to PEG tube area 15 g 0    buPROPion (WELLBUTRIN XL) 150 mg 24 hr tablet Take 1 tablet (150 mg total) by mouth every morning With a 300mg tab 90 tablet 0    buPROPion (Wellbutrin XL) 300 mg 24 hr tablet Take 1 tablet (300 mg total) by mouth every morning With a 150 mg tab 90 tablet 0    Calcium Carb-Cholecalciferol  (Calcium-Vitamin D3) 600-10 MG-MCG CAPS every 24 hours      carvedilol (COREG) 6.25 mg tablet Take 6.25 mg by mouth 2 (two) times a day      cholecalciferol (VITAMIN D) 400 units/1 mL Take by mouth      [employer] B-12 500 MCG tablet TAKE 1 TABLET BY MOUTH DAILY 30 tablet 0    gabapentin (NEURONTIN) 100 mg capsule TAKE 2 CAPSULES BY MOUTH 3 TIMES A DAY 90 capsule 1    hydrocortisone (ANUSOL-HC) 25 mg suppository Insert 1 suppository (25 mg total) into the rectum 2 (two) times a day 12 suppository 0    hydrocortisone 1 % cream Apply topically 3 (three) times a day 30 g 0    immune globulin, human 20 GM/200ML       pantoprazole (PROTONIX) 40 mg tablet Take 1 tablet (40 mg total) by mouth daily in the early morning 30 tablet 0    Potassium Chloride ER 20 MEQ TBCR Take 1 tablet by mouth daily With food      saccharomyces boulardii (FLORASTOR) 250 mg capsule Take 1 capsule (250 mg total) by mouth 2 (two) times a day 60 capsule 0     No current facility-administered medications for this visit.        No Known Allergies    Review of Systems   Constitutional:  Negative for activity change and appetite change.   Musculoskeletal:         S/p broken toe   Psychiatric/Behavioral:  Negative for dysphoric mood, sleep disturbance and suicidal ideas. The patient is not nervous/anxious.        Video Exam    There were no vitals filed for this visit.    Physical Exam  Constitutional:       Appearance: Normal appearance. He is normal weight.   Neurological:      Mental Status: He is alert.   Psychiatric:         Attention and Perception: Attention and perception normal.         Mood and Affect: Mood normal. Mood is not anxious or depressed. Affect is blunt.         Behavior: Behavior normal. Behavior is cooperative.         Thought Content: Thought content normal.         Judgment: Judgment normal.      Comments: Slow monotone speech - baseline          Visit Time    Visit Start Time: 1.58 pm   Visit Stop Time: 2.07 pm   Total Visit  Duration:  20 minutes

## 2024-08-08 ENCOUNTER — TELEPHONE (OUTPATIENT)
Dept: PSYCHIATRY | Facility: CLINIC | Age: 74
End: 2024-08-08

## 2024-08-08 ENCOUNTER — HOSPITAL ENCOUNTER (OUTPATIENT)
Dept: HOSPITAL 99 - RST | Age: 74
Discharge: HOME | End: 2024-08-08
Payer: MEDICARE

## 2024-08-08 DIAGNOSIS — R49.0: Primary | ICD-10-CM

## 2024-08-08 DIAGNOSIS — R13.10: ICD-10-CM

## 2024-08-08 NOTE — TELEPHONE ENCOUNTER
Called and left message for client offering follow-up medication check appt. with Dr. Jaqui Barker on 10/30 at 2:00 pm.

## 2024-08-19 ENCOUNTER — HOSPITAL ENCOUNTER (INPATIENT)
Dept: HOSPITAL 99 - 2 NORTH | Age: 74
LOS: 7 days | Discharge: NURSING, REHAB OR LONG-TERM CARE | DRG: 871 | End: 2024-08-26
Payer: MEDICARE

## 2024-08-19 VITALS — SYSTOLIC BLOOD PRESSURE: 80 MMHG | RESPIRATION RATE: 23 BRPM | DIASTOLIC BLOOD PRESSURE: 59 MMHG

## 2024-08-19 VITALS — RESPIRATION RATE: 26 BRPM

## 2024-08-19 VITALS — RESPIRATION RATE: 18 BRPM | DIASTOLIC BLOOD PRESSURE: 61 MMHG | SYSTOLIC BLOOD PRESSURE: 98 MMHG

## 2024-08-19 VITALS — DIASTOLIC BLOOD PRESSURE: 70 MMHG | RESPIRATION RATE: 23 BRPM | SYSTOLIC BLOOD PRESSURE: 100 MMHG

## 2024-08-19 VITALS — DIASTOLIC BLOOD PRESSURE: 66 MMHG | RESPIRATION RATE: 22 BRPM | SYSTOLIC BLOOD PRESSURE: 106 MMHG

## 2024-08-19 VITALS — DIASTOLIC BLOOD PRESSURE: 76 MMHG | RESPIRATION RATE: 22 BRPM | SYSTOLIC BLOOD PRESSURE: 118 MMHG

## 2024-08-19 VITALS — RESPIRATION RATE: 30 BRPM

## 2024-08-19 VITALS — RESPIRATION RATE: 21 BRPM | DIASTOLIC BLOOD PRESSURE: 69 MMHG | SYSTOLIC BLOOD PRESSURE: 103 MMHG

## 2024-08-19 VITALS — RESPIRATION RATE: 24 BRPM | DIASTOLIC BLOOD PRESSURE: 57 MMHG | SYSTOLIC BLOOD PRESSURE: 92 MMHG

## 2024-08-19 VITALS — DIASTOLIC BLOOD PRESSURE: 66 MMHG | SYSTOLIC BLOOD PRESSURE: 89 MMHG | RESPIRATION RATE: 22 BRPM

## 2024-08-19 VITALS — RESPIRATION RATE: 22 BRPM | DIASTOLIC BLOOD PRESSURE: 54 MMHG | SYSTOLIC BLOOD PRESSURE: 86 MMHG

## 2024-08-19 VITALS — RESPIRATION RATE: 16 BRPM | SYSTOLIC BLOOD PRESSURE: 108 MMHG | DIASTOLIC BLOOD PRESSURE: 65 MMHG

## 2024-08-19 VITALS — SYSTOLIC BLOOD PRESSURE: 99 MMHG | RESPIRATION RATE: 15 BRPM | DIASTOLIC BLOOD PRESSURE: 76 MMHG

## 2024-08-19 VITALS — RESPIRATION RATE: 23 BRPM | DIASTOLIC BLOOD PRESSURE: 69 MMHG | SYSTOLIC BLOOD PRESSURE: 86 MMHG

## 2024-08-19 VITALS — SYSTOLIC BLOOD PRESSURE: 91 MMHG | DIASTOLIC BLOOD PRESSURE: 57 MMHG | RESPIRATION RATE: 21 BRPM

## 2024-08-19 VITALS — RESPIRATION RATE: 19 BRPM | SYSTOLIC BLOOD PRESSURE: 92 MMHG | DIASTOLIC BLOOD PRESSURE: 71 MMHG

## 2024-08-19 VITALS — SYSTOLIC BLOOD PRESSURE: 104 MMHG | DIASTOLIC BLOOD PRESSURE: 62 MMHG | RESPIRATION RATE: 21 BRPM

## 2024-08-19 VITALS — SYSTOLIC BLOOD PRESSURE: 107 MMHG | RESPIRATION RATE: 16 BRPM | DIASTOLIC BLOOD PRESSURE: 70 MMHG

## 2024-08-19 VITALS — RESPIRATION RATE: 22 BRPM

## 2024-08-19 VITALS — RESPIRATION RATE: 24 BRPM | DIASTOLIC BLOOD PRESSURE: 50 MMHG | SYSTOLIC BLOOD PRESSURE: 82 MMHG

## 2024-08-19 VITALS — DIASTOLIC BLOOD PRESSURE: 59 MMHG | SYSTOLIC BLOOD PRESSURE: 86 MMHG | RESPIRATION RATE: 25 BRPM

## 2024-08-19 VITALS — RESPIRATION RATE: 22 BRPM | SYSTOLIC BLOOD PRESSURE: 105 MMHG | DIASTOLIC BLOOD PRESSURE: 61 MMHG

## 2024-08-19 VITALS — RESPIRATION RATE: 21 BRPM | DIASTOLIC BLOOD PRESSURE: 65 MMHG | SYSTOLIC BLOOD PRESSURE: 97 MMHG

## 2024-08-19 VITALS — RESPIRATION RATE: 29 BRPM

## 2024-08-19 VITALS — RESPIRATION RATE: 21 BRPM

## 2024-08-19 VITALS — RESPIRATION RATE: 23 BRPM | SYSTOLIC BLOOD PRESSURE: 94 MMHG | DIASTOLIC BLOOD PRESSURE: 73 MMHG

## 2024-08-19 VITALS — DIASTOLIC BLOOD PRESSURE: 63 MMHG | SYSTOLIC BLOOD PRESSURE: 94 MMHG

## 2024-08-19 VITALS — RESPIRATION RATE: 23 BRPM | SYSTOLIC BLOOD PRESSURE: 120 MMHG | DIASTOLIC BLOOD PRESSURE: 86 MMHG

## 2024-08-19 VITALS — DIASTOLIC BLOOD PRESSURE: 67 MMHG | SYSTOLIC BLOOD PRESSURE: 102 MMHG | RESPIRATION RATE: 24 BRPM

## 2024-08-19 VITALS — SYSTOLIC BLOOD PRESSURE: 83 MMHG | DIASTOLIC BLOOD PRESSURE: 61 MMHG

## 2024-08-19 VITALS — DIASTOLIC BLOOD PRESSURE: 65 MMHG | SYSTOLIC BLOOD PRESSURE: 96 MMHG | RESPIRATION RATE: 21 BRPM

## 2024-08-19 VITALS — SYSTOLIC BLOOD PRESSURE: 89 MMHG | RESPIRATION RATE: 24 BRPM | DIASTOLIC BLOOD PRESSURE: 59 MMHG

## 2024-08-19 VITALS — RESPIRATION RATE: 25 BRPM

## 2024-08-19 VITALS
BODY MASS INDEX: 19.5 KG/M2 | BODY MASS INDEX: 20.6 KG/M2 | BODY MASS INDEX: 20.2 KG/M2 | BODY MASS INDEX: 21.7 KG/M2 | BODY MASS INDEX: 21 KG/M2

## 2024-08-19 VITALS — DIASTOLIC BLOOD PRESSURE: 75 MMHG | RESPIRATION RATE: 20 BRPM | SYSTOLIC BLOOD PRESSURE: 96 MMHG

## 2024-08-19 VITALS — RESPIRATION RATE: 21 BRPM | DIASTOLIC BLOOD PRESSURE: 88 MMHG | SYSTOLIC BLOOD PRESSURE: 114 MMHG

## 2024-08-19 VITALS — RESPIRATION RATE: 24 BRPM

## 2024-08-19 VITALS — RESPIRATION RATE: 16 BRPM

## 2024-08-19 VITALS — DIASTOLIC BLOOD PRESSURE: 81 MMHG | SYSTOLIC BLOOD PRESSURE: 131 MMHG | RESPIRATION RATE: 20 BRPM

## 2024-08-19 VITALS — RESPIRATION RATE: 21 BRPM | SYSTOLIC BLOOD PRESSURE: 108 MMHG | DIASTOLIC BLOOD PRESSURE: 67 MMHG

## 2024-08-19 VITALS — SYSTOLIC BLOOD PRESSURE: 92 MMHG | RESPIRATION RATE: 22 BRPM | DIASTOLIC BLOOD PRESSURE: 68 MMHG

## 2024-08-19 VITALS — DIASTOLIC BLOOD PRESSURE: 67 MMHG | RESPIRATION RATE: 24 BRPM | SYSTOLIC BLOOD PRESSURE: 93 MMHG

## 2024-08-19 VITALS — RESPIRATION RATE: 32 BRPM

## 2024-08-19 VITALS — RESPIRATION RATE: 28 BRPM

## 2024-08-19 VITALS — SYSTOLIC BLOOD PRESSURE: 89 MMHG | RESPIRATION RATE: 19 BRPM | DIASTOLIC BLOOD PRESSURE: 55 MMHG

## 2024-08-19 VITALS — DIASTOLIC BLOOD PRESSURE: 64 MMHG | SYSTOLIC BLOOD PRESSURE: 90 MMHG

## 2024-08-19 VITALS — RESPIRATION RATE: 19 BRPM

## 2024-08-19 VITALS — RESPIRATION RATE: 24 BRPM | DIASTOLIC BLOOD PRESSURE: 70 MMHG | SYSTOLIC BLOOD PRESSURE: 94 MMHG

## 2024-08-19 VITALS — SYSTOLIC BLOOD PRESSURE: 124 MMHG | DIASTOLIC BLOOD PRESSURE: 107 MMHG

## 2024-08-19 VITALS — SYSTOLIC BLOOD PRESSURE: 100 MMHG | DIASTOLIC BLOOD PRESSURE: 59 MMHG | RESPIRATION RATE: 23 BRPM

## 2024-08-19 VITALS — SYSTOLIC BLOOD PRESSURE: 89 MMHG

## 2024-08-19 VITALS — RESPIRATION RATE: 27 BRPM

## 2024-08-19 VITALS — RESPIRATION RATE: 20 BRPM

## 2024-08-19 DIAGNOSIS — J38.01: ICD-10-CM

## 2024-08-19 DIAGNOSIS — D72.819: ICD-10-CM

## 2024-08-19 DIAGNOSIS — I25.2: ICD-10-CM

## 2024-08-19 DIAGNOSIS — Z11.52: ICD-10-CM

## 2024-08-19 DIAGNOSIS — Z85.46: ICD-10-CM

## 2024-08-19 DIAGNOSIS — K50.90: ICD-10-CM

## 2024-08-19 DIAGNOSIS — E87.20: ICD-10-CM

## 2024-08-19 DIAGNOSIS — F32.A: ICD-10-CM

## 2024-08-19 DIAGNOSIS — Z79.890: ICD-10-CM

## 2024-08-19 DIAGNOSIS — Z79.52: ICD-10-CM

## 2024-08-19 DIAGNOSIS — Z86.73: ICD-10-CM

## 2024-08-19 DIAGNOSIS — E87.1: ICD-10-CM

## 2024-08-19 DIAGNOSIS — B18.2: ICD-10-CM

## 2024-08-19 DIAGNOSIS — R65.21: ICD-10-CM

## 2024-08-19 DIAGNOSIS — D69.6: ICD-10-CM

## 2024-08-19 DIAGNOSIS — Z90.79: ICD-10-CM

## 2024-08-19 DIAGNOSIS — I48.0: ICD-10-CM

## 2024-08-19 DIAGNOSIS — F41.9: ICD-10-CM

## 2024-08-19 DIAGNOSIS — J44.9: ICD-10-CM

## 2024-08-19 DIAGNOSIS — R29.6: ICD-10-CM

## 2024-08-19 DIAGNOSIS — Z86.19: ICD-10-CM

## 2024-08-19 DIAGNOSIS — E03.9: ICD-10-CM

## 2024-08-19 DIAGNOSIS — Z86.711: ICD-10-CM

## 2024-08-19 DIAGNOSIS — I25.10: ICD-10-CM

## 2024-08-19 DIAGNOSIS — Z87.891: ICD-10-CM

## 2024-08-19 DIAGNOSIS — A41.51: Primary | ICD-10-CM

## 2024-08-19 DIAGNOSIS — Z16.24: ICD-10-CM

## 2024-08-19 DIAGNOSIS — R13.12: ICD-10-CM

## 2024-08-19 DIAGNOSIS — Z79.899: ICD-10-CM

## 2024-08-19 DIAGNOSIS — R74.01: ICD-10-CM

## 2024-08-19 DIAGNOSIS — E78.00: ICD-10-CM

## 2024-08-19 DIAGNOSIS — G61.81: ICD-10-CM

## 2024-08-19 DIAGNOSIS — N39.0: ICD-10-CM

## 2024-08-19 DIAGNOSIS — R32: ICD-10-CM

## 2024-08-19 LAB
ALBUMIN SERPL-MCNC: 3.5 G/DL (ref 3.5–5)
ALP SERPL-CCNC: 98 U/L (ref 38–126)
ALT SERPL-CCNC: 36 U/L (ref 0–50)
APTT PPP: 31.1 SEC (ref 23.4–35)
AST SERPL-CCNC: 66 U/L (ref 17–59)
BUN SERPL-MCNC: 23 MG/DL (ref 9–20)
CALCIUM SERPL-MCNC: 8.9 MG/DL (ref 8.4–10.2)
CHLORIDE SERPL-SCNC: 101 MMOL/L (ref 98–107)
CO2 SERPL-SCNC: 25 MMOL/L (ref 22–30)
CORTIS SERPL-MCNC: 58 UG/DL
EGFR: > 60
ERYTHROCYTE [DISTWIDTH] IN BLOOD BY AUTOMATED COUNT: 15 % (ref 11.5–14.5)
ESTIMATED CREATININE CLEARANCE: 71 ML/MIN
GLUCOSE SERPL-MCNC: 119 MG/DL (ref 70–99)
HCT VFR BLD AUTO: 40.2 % (ref 39–52)
HGB BLD-MCNC: 13.8 G/DL (ref 13–18)
INR PPP: 1.24
MAGNESIUM SERPL-MCNC: 1.7 MG/DL (ref 1.6–2.3)
MCHC RBC AUTO-ENTMCNC: 34.3 G/DL (ref 33–37)
MCV RBC AUTO: 84.6 FL (ref 80–94)
NRBC BLD AUTO-RTO: 0 %
PLATELET # BLD AUTO: 104 10^3/UL (ref 130–400)
POTASSIUM SERPL-SCNC: 3.5 MMOL/L (ref 3.5–5.1)
PROT SERPL-MCNC: 6.6 G/DL (ref 6.3–8.2)
PROTHROMBIN TIME: 15.4 SEC (ref 11.4–14.6)
SODIUM SERPL-SCNC: 134 MMOL/L (ref 135–145)
SQUAMOUS URNS QL MICRO: (no result) /LPF
URINE RED BLOOD CELL: (no result) /HPF (ref 0–2)
URINE WHITE CELL: (no result) /HPF (ref 0–5)

## 2024-08-19 RX ADMIN — Medication 250: at 12:38

## 2024-08-19 RX ADMIN — SODIUM CHLORIDE AND POTASSIUM CHLORIDE 1000: 9; 1.49 INJECTION, SOLUTION INTRAVENOUS at 13:42

## 2024-08-19 RX ADMIN — SULFASALAZINE 1000 MG: 500 TABLET ORAL at 19:56

## 2024-08-19 RX ADMIN — HEPARIN SODIUM 5000 UNITS: 5000 INJECTION, SOLUTION INTRAVENOUS; SUBCUTANEOUS at 23:11

## 2024-08-19 RX ADMIN — VANCOMYCIN HYDROCHLORIDE 300 MG: 1 INJECTION, POWDER, LYOPHILIZED, FOR SOLUTION INTRAVENOUS at 13:42

## 2024-08-19 RX ADMIN — ACETAMINOPHEN 650 MG: 325 TABLET ORAL at 15:29

## 2024-08-19 RX ADMIN — SODIUM CHLORIDE 1000: 900 INJECTION, SOLUTION INTRAVENOUS at 08:25

## 2024-08-19 RX ADMIN — CARVEDILOL: 3.12 TABLET, FILM COATED ORAL at 20:01

## 2024-08-19 RX ADMIN — CEFTRIAXONE 1000 MG: 1 INJECTION, POWDER, FOR SOLUTION INTRAMUSCULAR; INTRAVENOUS at 09:43

## 2024-08-19 RX ADMIN — SODIUM CHLORIDE 1000: 900 INJECTION, SOLUTION INTRAVENOUS at 10:34

## 2024-08-19 RX ADMIN — ACETAMINOPHEN 1000 MG: 500 TABLET ORAL at 08:20

## 2024-08-19 RX ADMIN — WATER 10 ML: 1 INJECTION INTRAMUSCULAR; INTRAVENOUS; SUBCUTANEOUS at 19:55

## 2024-08-19 RX ADMIN — MEROPENEM 500 MG: 500 INJECTION, POWDER, FOR SOLUTION INTRAVENOUS at 19:55

## 2024-08-19 RX ADMIN — SODIUM CHLORIDE 300 ML: 900 INJECTION, SOLUTION INTRAVENOUS at 13:42

## 2024-08-19 RX ADMIN — SODIUM CHLORIDE AND POTASSIUM CHLORIDE 1000: 9; 1.49 INJECTION, SOLUTION INTRAVENOUS at 23:12

## 2024-08-20 VITALS — DIASTOLIC BLOOD PRESSURE: 80 MMHG | RESPIRATION RATE: 24 BRPM | SYSTOLIC BLOOD PRESSURE: 112 MMHG

## 2024-08-20 VITALS — RESPIRATION RATE: 19 BRPM

## 2024-08-20 VITALS — RESPIRATION RATE: 17 BRPM | SYSTOLIC BLOOD PRESSURE: 105 MMHG | DIASTOLIC BLOOD PRESSURE: 75 MMHG

## 2024-08-20 VITALS — SYSTOLIC BLOOD PRESSURE: 116 MMHG | RESPIRATION RATE: 25 BRPM | DIASTOLIC BLOOD PRESSURE: 87 MMHG

## 2024-08-20 VITALS — RESPIRATION RATE: 15 BRPM

## 2024-08-20 VITALS — DIASTOLIC BLOOD PRESSURE: 74 MMHG | SYSTOLIC BLOOD PRESSURE: 133 MMHG | RESPIRATION RATE: 18 BRPM

## 2024-08-20 VITALS — RESPIRATION RATE: 22 BRPM

## 2024-08-20 VITALS — SYSTOLIC BLOOD PRESSURE: 112 MMHG | DIASTOLIC BLOOD PRESSURE: 83 MMHG | RESPIRATION RATE: 22 BRPM

## 2024-08-20 VITALS — DIASTOLIC BLOOD PRESSURE: 71 MMHG | RESPIRATION RATE: 21 BRPM | SYSTOLIC BLOOD PRESSURE: 103 MMHG

## 2024-08-20 VITALS — RESPIRATION RATE: 23 BRPM

## 2024-08-20 VITALS — RESPIRATION RATE: 24 BRPM

## 2024-08-20 VITALS — SYSTOLIC BLOOD PRESSURE: 108 MMHG | DIASTOLIC BLOOD PRESSURE: 70 MMHG | RESPIRATION RATE: 21 BRPM

## 2024-08-20 VITALS — SYSTOLIC BLOOD PRESSURE: 118 MMHG | RESPIRATION RATE: 25 BRPM | DIASTOLIC BLOOD PRESSURE: 62 MMHG

## 2024-08-20 VITALS — DIASTOLIC BLOOD PRESSURE: 82 MMHG | SYSTOLIC BLOOD PRESSURE: 116 MMHG | RESPIRATION RATE: 19 BRPM

## 2024-08-20 VITALS — SYSTOLIC BLOOD PRESSURE: 126 MMHG | OXYGEN SATURATION: 96 % | DIASTOLIC BLOOD PRESSURE: 102 MMHG | HEART RATE: 85 BPM

## 2024-08-20 VITALS — DIASTOLIC BLOOD PRESSURE: 69 MMHG | SYSTOLIC BLOOD PRESSURE: 126 MMHG | RESPIRATION RATE: 21 BRPM

## 2024-08-20 VITALS — RESPIRATION RATE: 25 BRPM

## 2024-08-20 VITALS — SYSTOLIC BLOOD PRESSURE: 113 MMHG | RESPIRATION RATE: 16 BRPM | DIASTOLIC BLOOD PRESSURE: 66 MMHG

## 2024-08-20 VITALS — DIASTOLIC BLOOD PRESSURE: 102 MMHG | HEART RATE: 87 BPM | SYSTOLIC BLOOD PRESSURE: 126 MMHG | OXYGEN SATURATION: 97 %

## 2024-08-20 VITALS — RESPIRATION RATE: 18 BRPM

## 2024-08-20 VITALS — RESPIRATION RATE: 21 BRPM

## 2024-08-20 VITALS — RESPIRATION RATE: 26 BRPM

## 2024-08-20 VITALS — SYSTOLIC BLOOD PRESSURE: 113 MMHG | DIASTOLIC BLOOD PRESSURE: 85 MMHG | RESPIRATION RATE: 17 BRPM

## 2024-08-20 VITALS — DIASTOLIC BLOOD PRESSURE: 70 MMHG | RESPIRATION RATE: 16 BRPM | SYSTOLIC BLOOD PRESSURE: 120 MMHG

## 2024-08-20 VITALS — OXYGEN SATURATION: 96 % | SYSTOLIC BLOOD PRESSURE: 126 MMHG | HEART RATE: 85 BPM | DIASTOLIC BLOOD PRESSURE: 102 MMHG

## 2024-08-20 VITALS — RESPIRATION RATE: 29 BRPM

## 2024-08-20 VITALS — RESPIRATION RATE: 16 BRPM | SYSTOLIC BLOOD PRESSURE: 112 MMHG | DIASTOLIC BLOOD PRESSURE: 83 MMHG

## 2024-08-20 VITALS — SYSTOLIC BLOOD PRESSURE: 109 MMHG | DIASTOLIC BLOOD PRESSURE: 76 MMHG | RESPIRATION RATE: 16 BRPM

## 2024-08-20 VITALS — RESPIRATION RATE: 16 BRPM

## 2024-08-20 VITALS — DIASTOLIC BLOOD PRESSURE: 66 MMHG | SYSTOLIC BLOOD PRESSURE: 99 MMHG | RESPIRATION RATE: 26 BRPM

## 2024-08-20 VITALS — RESPIRATION RATE: 20 BRPM

## 2024-08-20 VITALS — RESPIRATION RATE: 17 BRPM

## 2024-08-20 VITALS — RESPIRATION RATE: 35 BRPM

## 2024-08-20 LAB
BUN SERPL-MCNC: 16 MG/DL (ref 9–20)
CALCIUM SERPL-MCNC: 8.1 MG/DL (ref 8.4–10.2)
CHLORIDE SERPL-SCNC: 109 MMOL/L (ref 98–107)
CO2 SERPL-SCNC: 23 MMOL/L (ref 22–30)
EGFR: > 60
ERYTHROCYTE [DISTWIDTH] IN BLOOD BY AUTOMATED COUNT: 15.4 % (ref 11.5–14.5)
ESTIMATED CREATININE CLEARANCE: 94 ML/MIN
GLUCOSE SERPL-MCNC: 92 MG/DL (ref 70–99)
HCT VFR BLD AUTO: 35.3 % (ref 39–52)
HGB BLD-MCNC: 12.2 G/DL (ref 13–18)
MAGNESIUM SERPL-MCNC: 2.1 MG/DL (ref 1.6–2.3)
MCHC RBC AUTO-ENTMCNC: 34.6 G/DL (ref 33–37)
MCV RBC AUTO: 85.1 FL (ref 80–94)
PLATELET # BLD AUTO: 104 10^3/UL (ref 130–400)
POTASSIUM SERPL-SCNC: 4.3 MMOL/L (ref 3.5–5.1)
SODIUM SERPL-SCNC: 136 MMOL/L (ref 135–145)

## 2024-08-20 RX ADMIN — FLUOXETINE 80 MG: 20 CAPSULE ORAL at 07:49

## 2024-08-20 RX ADMIN — WATER 10 ML: 1 INJECTION INTRAMUSCULAR; INTRAVENOUS; SUBCUTANEOUS at 13:16

## 2024-08-20 RX ADMIN — MEROPENEM 500 MG: 500 INJECTION, POWDER, FOR SOLUTION INTRAVENOUS at 01:47

## 2024-08-20 RX ADMIN — WATER 10 ML: 1 INJECTION INTRAMUSCULAR; INTRAVENOUS; SUBCUTANEOUS at 21:14

## 2024-08-20 RX ADMIN — WATER 10 ML: 1 INJECTION INTRAMUSCULAR; INTRAVENOUS; SUBCUTANEOUS at 07:54

## 2024-08-20 RX ADMIN — SULFASALAZINE 1000 MG: 500 TABLET ORAL at 21:14

## 2024-08-20 RX ADMIN — MEROPENEM 500 MG: 500 INJECTION, POWDER, FOR SOLUTION INTRAVENOUS at 07:54

## 2024-08-20 RX ADMIN — PREDNISONE 10 MG: 10 TABLET ORAL at 07:51

## 2024-08-20 RX ADMIN — CARVEDILOL 3.12 MG: 3.12 TABLET, FILM COATED ORAL at 21:12

## 2024-08-20 RX ADMIN — SODIUM CHLORIDE AND POTASSIUM CHLORIDE: 9; 1.49 INJECTION, SOLUTION INTRAVENOUS at 10:47

## 2024-08-20 RX ADMIN — MEROPENEM 500 MG: 500 INJECTION, POWDER, FOR SOLUTION INTRAVENOUS at 21:15

## 2024-08-20 RX ADMIN — CALCIUM CARBONATE-VITAMIN D TAB 500 MG-200 UNIT 1000 MG: 500-200 TAB at 07:51

## 2024-08-20 RX ADMIN — ATORVASTATIN CALCIUM 20 MG: 20 TABLET, FILM COATED ORAL at 07:51

## 2024-08-20 RX ADMIN — BUPROPION HYDROCHLORIDE 300 MG: 300 TABLET, FILM COATED, EXTENDED RELEASE ORAL at 07:50

## 2024-08-20 RX ADMIN — BUPROPION HYDROCHLORIDE 150 MG: 150 TABLET, FILM COATED, EXTENDED RELEASE ORAL at 07:50

## 2024-08-20 RX ADMIN — Medication 500 MG: at 07:50

## 2024-08-20 RX ADMIN — SODIUM PHOSPHATE, MONOBASIC, MONOHYDRATE AND SODIUM PHOSPHATE, DIBASIC, ANHYDROUS 255 MEQ: 142; 276 INJECTION, SOLUTION INTRAVENOUS at 05:58

## 2024-08-20 RX ADMIN — ENOXAPARIN SODIUM 40 MG: 40 INJECTION SUBCUTANEOUS at 17:45

## 2024-08-20 RX ADMIN — HEPARIN SODIUM 5000 UNITS: 5000 INJECTION, SOLUTION INTRAVENOUS; SUBCUTANEOUS at 07:54

## 2024-08-20 RX ADMIN — WATER 10 ML: 1 INJECTION INTRAMUSCULAR; INTRAVENOUS; SUBCUTANEOUS at 01:47

## 2024-08-20 RX ADMIN — CARVEDILOL 3.12 MG: 3.12 TABLET, FILM COATED ORAL at 07:50

## 2024-08-20 RX ADMIN — MEROPENEM 500 MG: 500 INJECTION, POWDER, FOR SOLUTION INTRAVENOUS at 13:16

## 2024-08-20 RX ADMIN — ARIPIPRAZOLE 10 MG: 10 TABLET ORAL at 08:55

## 2024-08-20 RX ADMIN — Medication 10 MG: at 22:14

## 2024-08-20 RX ADMIN — ACETAMINOPHEN 650 MG: 325 TABLET ORAL at 06:02

## 2024-08-20 RX ADMIN — LEVOTHYROXINE SODIUM 88 MCG: 0.09 TABLET ORAL at 05:58

## 2024-08-20 RX ADMIN — SULFASALAZINE 1000 MG: 500 TABLET ORAL at 07:49

## 2024-08-21 VITALS — DIASTOLIC BLOOD PRESSURE: 74 MMHG | HEART RATE: 110 BPM | SYSTOLIC BLOOD PRESSURE: 109 MMHG | OXYGEN SATURATION: 95 %

## 2024-08-21 VITALS — DIASTOLIC BLOOD PRESSURE: 69 MMHG | RESPIRATION RATE: 16 BRPM | SYSTOLIC BLOOD PRESSURE: 103 MMHG

## 2024-08-21 VITALS — RESPIRATION RATE: 16 BRPM | SYSTOLIC BLOOD PRESSURE: 149 MMHG | DIASTOLIC BLOOD PRESSURE: 87 MMHG

## 2024-08-21 VITALS — DIASTOLIC BLOOD PRESSURE: 92 MMHG | RESPIRATION RATE: 18 BRPM | SYSTOLIC BLOOD PRESSURE: 119 MMHG

## 2024-08-21 VITALS — DIASTOLIC BLOOD PRESSURE: 65 MMHG | RESPIRATION RATE: 18 BRPM | SYSTOLIC BLOOD PRESSURE: 106 MMHG

## 2024-08-21 VITALS — RESPIRATION RATE: 16 BRPM | SYSTOLIC BLOOD PRESSURE: 126 MMHG | DIASTOLIC BLOOD PRESSURE: 79 MMHG

## 2024-08-21 LAB
ALBUMIN SERPL-MCNC: 3 G/DL (ref 3.5–5)
ALP SERPL-CCNC: 106 U/L (ref 38–126)
ALT SERPL-CCNC: 44 U/L (ref 0–50)
AST SERPL-CCNC: 75 U/L (ref 17–59)
BUN SERPL-MCNC: 15 MG/DL (ref 9–20)
CALCIUM SERPL-MCNC: 8.4 MG/DL (ref 8.4–10.2)
CHLORIDE SERPL-SCNC: 102 MMOL/L (ref 98–107)
CO2 SERPL-SCNC: 31 MMOL/L (ref 22–30)
EGFR: > 60
ERYTHROCYTE [DISTWIDTH] IN BLOOD BY AUTOMATED COUNT: 15.4 % (ref 11.5–14.5)
ESTIMATED CREATININE CLEARANCE: 87 ML/MIN
GLUCOSE SERPL-MCNC: 120 MG/DL (ref 70–99)
HCT VFR BLD AUTO: 40.4 % (ref 39–52)
HGB BLD-MCNC: 13.4 G/DL (ref 13–18)
MAGNESIUM SERPL-MCNC: 2.1 MG/DL (ref 1.6–2.3)
MCHC RBC AUTO-ENTMCNC: 33.2 G/DL (ref 33–37)
MCV RBC AUTO: 87.3 FL (ref 80–94)
NRBC BLD AUTO-RTO: 0 %
PLATELET # BLD AUTO: 124 10^3/UL (ref 130–400)
POTASSIUM SERPL-SCNC: 4 MMOL/L (ref 3.5–5.1)
PROT SERPL-MCNC: 5.9 G/DL (ref 6.3–8.2)
SODIUM SERPL-SCNC: 134 MMOL/L (ref 135–145)

## 2024-08-21 RX ADMIN — WATER 10 ML: 1 INJECTION INTRAMUSCULAR; INTRAVENOUS; SUBCUTANEOUS at 14:30

## 2024-08-21 RX ADMIN — SULFASALAZINE 1000 MG: 500 TABLET ORAL at 08:45

## 2024-08-21 RX ADMIN — MEROPENEM 500 MG: 500 INJECTION, POWDER, FOR SOLUTION INTRAVENOUS at 20:34

## 2024-08-21 RX ADMIN — ENOXAPARIN SODIUM 40 MG: 40 INJECTION SUBCUTANEOUS at 17:33

## 2024-08-21 RX ADMIN — WATER 10 ML: 1 INJECTION INTRAMUSCULAR; INTRAVENOUS; SUBCUTANEOUS at 08:44

## 2024-08-21 RX ADMIN — BUPROPION HYDROCHLORIDE 300 MG: 300 TABLET, FILM COATED, EXTENDED RELEASE ORAL at 08:45

## 2024-08-21 RX ADMIN — SULFASALAZINE 1000 MG: 500 TABLET ORAL at 20:35

## 2024-08-21 RX ADMIN — FLUOXETINE 80 MG: 20 CAPSULE ORAL at 08:45

## 2024-08-21 RX ADMIN — MEROPENEM 500 MG: 500 INJECTION, POWDER, FOR SOLUTION INTRAVENOUS at 14:30

## 2024-08-21 RX ADMIN — WATER 10 ML: 1 INJECTION INTRAMUSCULAR; INTRAVENOUS; SUBCUTANEOUS at 20:34

## 2024-08-21 RX ADMIN — LEVOTHYROXINE SODIUM 88 MCG: 0.09 TABLET ORAL at 05:28

## 2024-08-21 RX ADMIN — ARIPIPRAZOLE 10 MG: 10 TABLET ORAL at 08:44

## 2024-08-21 RX ADMIN — PREDNISONE 10 MG: 10 TABLET ORAL at 08:45

## 2024-08-21 RX ADMIN — MEROPENEM 500 MG: 500 INJECTION, POWDER, FOR SOLUTION INTRAVENOUS at 02:00

## 2024-08-21 RX ADMIN — CARVEDILOL 3.12 MG: 3.12 TABLET, FILM COATED ORAL at 08:44

## 2024-08-21 RX ADMIN — WATER 10 ML: 1 INJECTION INTRAMUSCULAR; INTRAVENOUS; SUBCUTANEOUS at 02:00

## 2024-08-21 RX ADMIN — Medication 500 MG: at 08:44

## 2024-08-21 RX ADMIN — ATORVASTATIN CALCIUM 20 MG: 20 TABLET, FILM COATED ORAL at 08:45

## 2024-08-21 RX ADMIN — CALCIUM CARBONATE-VITAMIN D TAB 500 MG-200 UNIT 1000 MG: 500-200 TAB at 08:45

## 2024-08-21 RX ADMIN — BUPROPION HYDROCHLORIDE 150 MG: 150 TABLET, FILM COATED, EXTENDED RELEASE ORAL at 08:44

## 2024-08-21 RX ADMIN — MEROPENEM 500 MG: 500 INJECTION, POWDER, FOR SOLUTION INTRAVENOUS at 08:44

## 2024-08-21 RX ADMIN — CARVEDILOL 3.12 MG: 3.12 TABLET, FILM COATED ORAL at 20:39

## 2024-08-22 VITALS — DIASTOLIC BLOOD PRESSURE: 77 MMHG | OXYGEN SATURATION: 96 % | HEART RATE: 92 BPM | SYSTOLIC BLOOD PRESSURE: 102 MMHG

## 2024-08-22 VITALS — SYSTOLIC BLOOD PRESSURE: 127 MMHG | RESPIRATION RATE: 18 BRPM | DIASTOLIC BLOOD PRESSURE: 70 MMHG

## 2024-08-22 VITALS — DIASTOLIC BLOOD PRESSURE: 90 MMHG | SYSTOLIC BLOOD PRESSURE: 140 MMHG | RESPIRATION RATE: 16 BRPM

## 2024-08-22 VITALS — SYSTOLIC BLOOD PRESSURE: 105 MMHG | DIASTOLIC BLOOD PRESSURE: 88 MMHG | RESPIRATION RATE: 18 BRPM

## 2024-08-22 VITALS — SYSTOLIC BLOOD PRESSURE: 125 MMHG | DIASTOLIC BLOOD PRESSURE: 86 MMHG | RESPIRATION RATE: 18 BRPM

## 2024-08-22 VITALS — SYSTOLIC BLOOD PRESSURE: 114 MMHG | RESPIRATION RATE: 18 BRPM | DIASTOLIC BLOOD PRESSURE: 83 MMHG

## 2024-08-22 VITALS — HEART RATE: 89 BPM | SYSTOLIC BLOOD PRESSURE: 105 MMHG | DIASTOLIC BLOOD PRESSURE: 77 MMHG | OXYGEN SATURATION: 96 %

## 2024-08-22 RX ADMIN — WATER 10 ML: 1 INJECTION INTRAMUSCULAR; INTRAVENOUS; SUBCUTANEOUS at 01:08

## 2024-08-22 RX ADMIN — FLUOXETINE 80 MG: 20 CAPSULE ORAL at 08:07

## 2024-08-22 RX ADMIN — WATER 10 ML: 1 INJECTION INTRAMUSCULAR; INTRAVENOUS; SUBCUTANEOUS at 08:07

## 2024-08-22 RX ADMIN — CALCIUM CARBONATE-VITAMIN D TAB 500 MG-200 UNIT 1000 MG: 500-200 TAB at 08:07

## 2024-08-22 RX ADMIN — MEROPENEM 500 MG: 500 INJECTION, POWDER, FOR SOLUTION INTRAVENOUS at 20:50

## 2024-08-22 RX ADMIN — ATORVASTATIN CALCIUM 20 MG: 20 TABLET, FILM COATED ORAL at 08:07

## 2024-08-22 RX ADMIN — SULFASALAZINE 1000 MG: 500 TABLET ORAL at 20:51

## 2024-08-22 RX ADMIN — MEROPENEM 500 MG: 500 INJECTION, POWDER, FOR SOLUTION INTRAVENOUS at 08:08

## 2024-08-22 RX ADMIN — MEROPENEM 500 MG: 500 INJECTION, POWDER, FOR SOLUTION INTRAVENOUS at 15:34

## 2024-08-22 RX ADMIN — BUPROPION HYDROCHLORIDE 150 MG: 150 TABLET, FILM COATED, EXTENDED RELEASE ORAL at 08:07

## 2024-08-22 RX ADMIN — ENOXAPARIN SODIUM 40 MG: 40 INJECTION SUBCUTANEOUS at 17:04

## 2024-08-22 RX ADMIN — PREDNISONE 10 MG: 10 TABLET ORAL at 08:07

## 2024-08-22 RX ADMIN — ARIPIPRAZOLE 10 MG: 10 TABLET ORAL at 08:07

## 2024-08-22 RX ADMIN — SULFASALAZINE 1000 MG: 500 TABLET ORAL at 08:07

## 2024-08-22 RX ADMIN — Medication 500 MG: at 08:07

## 2024-08-22 RX ADMIN — MEROPENEM 500 MG: 500 INJECTION, POWDER, FOR SOLUTION INTRAVENOUS at 01:08

## 2024-08-22 RX ADMIN — WATER 10 ML: 1 INJECTION INTRAMUSCULAR; INTRAVENOUS; SUBCUTANEOUS at 20:50

## 2024-08-22 RX ADMIN — LEVOTHYROXINE SODIUM 88 MCG: 0.09 TABLET ORAL at 05:05

## 2024-08-22 RX ADMIN — CARVEDILOL 3.12 MG: 3.12 TABLET, FILM COATED ORAL at 08:07

## 2024-08-22 RX ADMIN — BUPROPION HYDROCHLORIDE 300 MG: 300 TABLET, FILM COATED, EXTENDED RELEASE ORAL at 08:07

## 2024-08-22 RX ADMIN — CARVEDILOL 3.12 MG: 3.12 TABLET, FILM COATED ORAL at 20:50

## 2024-08-22 RX ADMIN — WATER 10 ML: 1 INJECTION INTRAMUSCULAR; INTRAVENOUS; SUBCUTANEOUS at 15:34

## 2024-08-23 VITALS — RESPIRATION RATE: 18 BRPM | SYSTOLIC BLOOD PRESSURE: 129 MMHG | DIASTOLIC BLOOD PRESSURE: 86 MMHG

## 2024-08-23 VITALS — SYSTOLIC BLOOD PRESSURE: 124 MMHG | DIASTOLIC BLOOD PRESSURE: 93 MMHG | RESPIRATION RATE: 17 BRPM

## 2024-08-23 VITALS — SYSTOLIC BLOOD PRESSURE: 114 MMHG | DIASTOLIC BLOOD PRESSURE: 76 MMHG | RESPIRATION RATE: 17 BRPM

## 2024-08-23 VITALS — DIASTOLIC BLOOD PRESSURE: 75 MMHG | RESPIRATION RATE: 20 BRPM | SYSTOLIC BLOOD PRESSURE: 107 MMHG

## 2024-08-23 VITALS — RESPIRATION RATE: 18 BRPM | SYSTOLIC BLOOD PRESSURE: 147 MMHG | DIASTOLIC BLOOD PRESSURE: 83 MMHG

## 2024-08-23 VITALS — RESPIRATION RATE: 18 BRPM | DIASTOLIC BLOOD PRESSURE: 88 MMHG | SYSTOLIC BLOOD PRESSURE: 133 MMHG

## 2024-08-23 VITALS — DIASTOLIC BLOOD PRESSURE: 90 MMHG | RESPIRATION RATE: 18 BRPM | SYSTOLIC BLOOD PRESSURE: 131 MMHG

## 2024-08-23 RX ADMIN — CARVEDILOL 3.12 MG: 3.12 TABLET, FILM COATED ORAL at 20:50

## 2024-08-23 RX ADMIN — PREDNISONE 10 MG: 10 TABLET ORAL at 07:48

## 2024-08-23 RX ADMIN — BUPROPION HYDROCHLORIDE 150 MG: 150 TABLET, FILM COATED, EXTENDED RELEASE ORAL at 07:48

## 2024-08-23 RX ADMIN — CALCIUM CARBONATE-VITAMIN D TAB 500 MG-200 UNIT 1000 MG: 500-200 TAB at 07:48

## 2024-08-23 RX ADMIN — BUPROPION HYDROCHLORIDE 300 MG: 300 TABLET, FILM COATED, EXTENDED RELEASE ORAL at 07:48

## 2024-08-23 RX ADMIN — ATORVASTATIN CALCIUM 20 MG: 20 TABLET, FILM COATED ORAL at 07:48

## 2024-08-23 RX ADMIN — Medication 500 MG: at 07:47

## 2024-08-23 RX ADMIN — ARIPIPRAZOLE 10 MG: 10 TABLET ORAL at 07:48

## 2024-08-23 RX ADMIN — CARVEDILOL 3.12 MG: 3.12 TABLET, FILM COATED ORAL at 07:48

## 2024-08-23 RX ADMIN — SULFASALAZINE 1000 MG: 500 TABLET ORAL at 07:48

## 2024-08-23 RX ADMIN — LEVOTHYROXINE SODIUM 88 MCG: 0.09 TABLET ORAL at 06:16

## 2024-08-23 RX ADMIN — CEFAZOLIN 10: 10 INJECTION, POWDER, FOR SOLUTION INTRAVENOUS at 20:49

## 2024-08-23 RX ADMIN — MEROPENEM 500 MG: 500 INJECTION, POWDER, FOR SOLUTION INTRAVENOUS at 07:49

## 2024-08-23 RX ADMIN — CEFAZOLIN 10: 10 INJECTION, POWDER, FOR SOLUTION INTRAVENOUS at 11:47

## 2024-08-23 RX ADMIN — WATER 10 ML: 1 INJECTION INTRAMUSCULAR; INTRAVENOUS; SUBCUTANEOUS at 07:49

## 2024-08-23 RX ADMIN — FLUOXETINE 80 MG: 20 CAPSULE ORAL at 07:47

## 2024-08-23 RX ADMIN — WATER 10 ML: 1 INJECTION INTRAMUSCULAR; INTRAVENOUS; SUBCUTANEOUS at 03:02

## 2024-08-23 RX ADMIN — MEROPENEM 500 MG: 500 INJECTION, POWDER, FOR SOLUTION INTRAVENOUS at 03:02

## 2024-08-23 RX ADMIN — Medication 2 FLUSH: at 20:51

## 2024-08-23 RX ADMIN — Medication 2 FLUSH: at 11:46

## 2024-08-23 RX ADMIN — ENOXAPARIN SODIUM 40 MG: 40 INJECTION SUBCUTANEOUS at 17:51

## 2024-08-23 RX ADMIN — SULFASALAZINE 1000 MG: 500 TABLET ORAL at 20:50

## 2024-08-24 VITALS — DIASTOLIC BLOOD PRESSURE: 84 MMHG | SYSTOLIC BLOOD PRESSURE: 119 MMHG | RESPIRATION RATE: 17 BRPM

## 2024-08-24 VITALS — SYSTOLIC BLOOD PRESSURE: 124 MMHG | DIASTOLIC BLOOD PRESSURE: 83 MMHG | RESPIRATION RATE: 16 BRPM

## 2024-08-24 VITALS — DIASTOLIC BLOOD PRESSURE: 84 MMHG | SYSTOLIC BLOOD PRESSURE: 131 MMHG | RESPIRATION RATE: 18 BRPM

## 2024-08-24 VITALS — DIASTOLIC BLOOD PRESSURE: 76 MMHG | SYSTOLIC BLOOD PRESSURE: 105 MMHG | RESPIRATION RATE: 16 BRPM

## 2024-08-24 VITALS — SYSTOLIC BLOOD PRESSURE: 111 MMHG | RESPIRATION RATE: 16 BRPM | DIASTOLIC BLOOD PRESSURE: 70 MMHG

## 2024-08-24 VITALS — SYSTOLIC BLOOD PRESSURE: 123 MMHG | RESPIRATION RATE: 19 BRPM | DIASTOLIC BLOOD PRESSURE: 72 MMHG

## 2024-08-24 VITALS — DIASTOLIC BLOOD PRESSURE: 72 MMHG | SYSTOLIC BLOOD PRESSURE: 123 MMHG | RESPIRATION RATE: 19 BRPM

## 2024-08-24 LAB
BUN SERPL-MCNC: 21 MG/DL (ref 9–20)
CALCIUM SERPL-MCNC: 8.8 MG/DL (ref 8.4–10.2)
CHLORIDE SERPL-SCNC: 100 MMOL/L (ref 98–107)
CO2 SERPL-SCNC: 30 MMOL/L (ref 22–30)
EGFR: > 60
ERYTHROCYTE [DISTWIDTH] IN BLOOD BY AUTOMATED COUNT: 15.2 % (ref 11.5–14.5)
ESTIMATED CREATININE CLEARANCE: 73 ML/MIN
GLUCOSE SERPL-MCNC: 95 MG/DL (ref 70–99)
HCT VFR BLD AUTO: 42.1 % (ref 39–52)
HGB BLD-MCNC: 14 G/DL (ref 13–18)
MCHC RBC AUTO-ENTMCNC: 33.3 G/DL (ref 33–37)
MCV RBC AUTO: 87.7 FL (ref 80–94)
PLATELET # BLD AUTO: 184 10^3/UL (ref 130–400)
POTASSIUM SERPL-SCNC: 4.7 MMOL/L (ref 3.5–5.1)
SODIUM SERPL-SCNC: 138 MMOL/L (ref 135–145)

## 2024-08-24 RX ADMIN — CEFAZOLIN 10: 10 INJECTION, POWDER, FOR SOLUTION INTRAVENOUS at 13:52

## 2024-08-24 RX ADMIN — LEVOTHYROXINE SODIUM 88 MCG: 0.09 TABLET ORAL at 04:46

## 2024-08-24 RX ADMIN — BUPROPION HYDROCHLORIDE 150 MG: 150 TABLET, FILM COATED, EXTENDED RELEASE ORAL at 09:02

## 2024-08-24 RX ADMIN — SULFASALAZINE 1000 MG: 500 TABLET ORAL at 09:02

## 2024-08-24 RX ADMIN — CEFAZOLIN 10: 10 INJECTION, POWDER, FOR SOLUTION INTRAVENOUS at 03:13

## 2024-08-24 RX ADMIN — CALCIUM CARBONATE-VITAMIN D TAB 500 MG-200 UNIT 1000 MG: 500-200 TAB at 09:02

## 2024-08-24 RX ADMIN — CEFAZOLIN 10: 10 INJECTION, POWDER, FOR SOLUTION INTRAVENOUS at 20:02

## 2024-08-24 RX ADMIN — CARVEDILOL 3.12 MG: 3.12 TABLET, FILM COATED ORAL at 09:03

## 2024-08-24 RX ADMIN — Medication 2 FLUSH: at 20:03

## 2024-08-24 RX ADMIN — CARVEDILOL 3.12 MG: 3.12 TABLET, FILM COATED ORAL at 20:03

## 2024-08-24 RX ADMIN — Medication 2 FLUSH: at 03:14

## 2024-08-24 RX ADMIN — Medication 500 MG: at 09:02

## 2024-08-24 RX ADMIN — SULFASALAZINE 1000 MG: 500 TABLET ORAL at 20:03

## 2024-08-24 RX ADMIN — PREDNISONE 10 MG: 10 TABLET ORAL at 09:02

## 2024-08-24 RX ADMIN — ATORVASTATIN CALCIUM 20 MG: 20 TABLET, FILM COATED ORAL at 09:02

## 2024-08-24 RX ADMIN — ENOXAPARIN SODIUM 40 MG: 40 INJECTION SUBCUTANEOUS at 17:22

## 2024-08-24 RX ADMIN — BUPROPION HYDROCHLORIDE 300 MG: 300 TABLET, FILM COATED, EXTENDED RELEASE ORAL at 09:02

## 2024-08-24 RX ADMIN — ARIPIPRAZOLE 10 MG: 10 TABLET ORAL at 09:02

## 2024-08-24 RX ADMIN — FLUOXETINE 80 MG: 20 CAPSULE ORAL at 09:02

## 2024-08-25 VITALS — SYSTOLIC BLOOD PRESSURE: 139 MMHG | DIASTOLIC BLOOD PRESSURE: 85 MMHG | RESPIRATION RATE: 16 BRPM

## 2024-08-25 VITALS — DIASTOLIC BLOOD PRESSURE: 71 MMHG | SYSTOLIC BLOOD PRESSURE: 118 MMHG | RESPIRATION RATE: 18 BRPM

## 2024-08-25 VITALS — SYSTOLIC BLOOD PRESSURE: 116 MMHG | RESPIRATION RATE: 71 BRPM | DIASTOLIC BLOOD PRESSURE: 66 MMHG

## 2024-08-25 VITALS — DIASTOLIC BLOOD PRESSURE: 76 MMHG | SYSTOLIC BLOOD PRESSURE: 115 MMHG | RESPIRATION RATE: 18 BRPM

## 2024-08-25 VITALS — SYSTOLIC BLOOD PRESSURE: 105 MMHG | DIASTOLIC BLOOD PRESSURE: 67 MMHG | RESPIRATION RATE: 16 BRPM

## 2024-08-25 VITALS — RESPIRATION RATE: 16 BRPM | SYSTOLIC BLOOD PRESSURE: 129 MMHG | DIASTOLIC BLOOD PRESSURE: 79 MMHG

## 2024-08-25 VITALS — RESPIRATION RATE: 16 BRPM | DIASTOLIC BLOOD PRESSURE: 85 MMHG | SYSTOLIC BLOOD PRESSURE: 139 MMHG

## 2024-08-25 LAB
BUN SERPL-MCNC: 19 MG/DL (ref 9–20)
CALCIUM SERPL-MCNC: 9 MG/DL (ref 8.4–10.2)
CHLORIDE SERPL-SCNC: 100 MMOL/L (ref 98–107)
CO2 SERPL-SCNC: 29 MMOL/L (ref 22–30)
EGFR: > 60
ERYTHROCYTE [DISTWIDTH] IN BLOOD BY AUTOMATED COUNT: 15.5 % (ref 11.5–14.5)
ESTIMATED CREATININE CLEARANCE: 73 ML/MIN
GLUCOSE SERPL-MCNC: 79 MG/DL (ref 70–99)
HCT VFR BLD AUTO: 42.2 % (ref 39–52)
HGB BLD-MCNC: 14.1 G/DL (ref 13–18)
MCHC RBC AUTO-ENTMCNC: 33.4 G/DL (ref 33–37)
MCV RBC AUTO: 86.8 FL (ref 80–94)
PLATELET # BLD AUTO: 207 10^3/UL (ref 130–400)
POTASSIUM SERPL-SCNC: 4.2 MMOL/L (ref 3.5–5.1)
SODIUM SERPL-SCNC: 138 MMOL/L (ref 135–145)

## 2024-08-25 RX ADMIN — Medication 2 FLUSH: at 03:58

## 2024-08-25 RX ADMIN — SULFASALAZINE 1000 MG: 500 TABLET ORAL at 08:45

## 2024-08-25 RX ADMIN — FLUOXETINE: 20 CAPSULE ORAL at 08:50

## 2024-08-25 RX ADMIN — CARVEDILOL 3.12 MG: 3.12 TABLET, FILM COATED ORAL at 21:51

## 2024-08-25 RX ADMIN — CALCIUM CARBONATE-VITAMIN D TAB 500 MG-200 UNIT 1000 MG: 500-200 TAB at 08:45

## 2024-08-25 RX ADMIN — CARVEDILOL 3.12 MG: 3.12 TABLET, FILM COATED ORAL at 08:46

## 2024-08-25 RX ADMIN — CEFAZOLIN 10: 10 INJECTION, POWDER, FOR SOLUTION INTRAVENOUS at 21:50

## 2024-08-25 RX ADMIN — BUPROPION HYDROCHLORIDE 150 MG: 150 TABLET, FILM COATED, EXTENDED RELEASE ORAL at 08:45

## 2024-08-25 RX ADMIN — BUPROPION HYDROCHLORIDE 300 MG: 300 TABLET, FILM COATED, EXTENDED RELEASE ORAL at 08:45

## 2024-08-25 RX ADMIN — LEVOTHYROXINE SODIUM 88 MCG: 0.09 TABLET ORAL at 04:29

## 2024-08-25 RX ADMIN — PREDNISONE 10 MG: 10 TABLET ORAL at 08:45

## 2024-08-25 RX ADMIN — ENOXAPARIN SODIUM 40 MG: 40 INJECTION SUBCUTANEOUS at 17:41

## 2024-08-25 RX ADMIN — ARIPIPRAZOLE 10 MG: 10 TABLET ORAL at 08:45

## 2024-08-25 RX ADMIN — ALENDRONATE SODIUM 70 MG: 70 TABLET ORAL at 08:45

## 2024-08-25 RX ADMIN — FLUOXETINE 80 MG: 20 CAPSULE ORAL at 21:53

## 2024-08-25 RX ADMIN — SULFASALAZINE 1000 MG: 500 TABLET ORAL at 21:51

## 2024-08-25 RX ADMIN — CEFAZOLIN 10: 10 INJECTION, POWDER, FOR SOLUTION INTRAVENOUS at 03:55

## 2024-08-25 RX ADMIN — Medication 500 MG: at 08:45

## 2024-08-25 RX ADMIN — CEFAZOLIN 10: 10 INJECTION, POWDER, FOR SOLUTION INTRAVENOUS at 12:45

## 2024-08-25 RX ADMIN — ATORVASTATIN CALCIUM 20 MG: 20 TABLET, FILM COATED ORAL at 08:45

## 2024-08-26 VITALS — RESPIRATION RATE: 16 BRPM | SYSTOLIC BLOOD PRESSURE: 127 MMHG | DIASTOLIC BLOOD PRESSURE: 75 MMHG

## 2024-08-26 VITALS — SYSTOLIC BLOOD PRESSURE: 134 MMHG | RESPIRATION RATE: 18 BRPM | DIASTOLIC BLOOD PRESSURE: 88 MMHG

## 2024-08-26 LAB
BUN SERPL-MCNC: 17 MG/DL (ref 9–20)
CALCIUM SERPL-MCNC: 9.2 MG/DL (ref 8.4–10.2)
CHLORIDE SERPL-SCNC: 97 MMOL/L (ref 98–107)
CO2 SERPL-SCNC: 33 MMOL/L (ref 22–30)
EGFR: > 60
ERYTHROCYTE [DISTWIDTH] IN BLOOD BY AUTOMATED COUNT: 15.4 % (ref 11.5–14.5)
ESTIMATED CREATININE CLEARANCE: 73 ML/MIN
GLUCOSE SERPL-MCNC: 94 MG/DL (ref 70–99)
HCT VFR BLD AUTO: 42.2 % (ref 39–52)
HGB BLD-MCNC: 14 G/DL (ref 13–18)
MCHC RBC AUTO-ENTMCNC: 33.2 G/DL (ref 33–37)
MCV RBC AUTO: 88.3 FL (ref 80–94)
PLATELET # BLD AUTO: 271 10^3/UL (ref 130–400)
POTASSIUM SERPL-SCNC: 3.9 MMOL/L (ref 3.5–5.1)
SODIUM SERPL-SCNC: 139 MMOL/L (ref 135–145)

## 2024-08-26 RX ADMIN — CEFAZOLIN 10: 10 INJECTION, POWDER, FOR SOLUTION INTRAVENOUS at 04:51

## 2024-08-26 RX ADMIN — PREDNISONE 10 MG: 10 TABLET ORAL at 08:44

## 2024-08-26 RX ADMIN — SULFASALAZINE 1000 MG: 500 TABLET ORAL at 08:44

## 2024-08-26 RX ADMIN — CALCIUM CARBONATE-VITAMIN D TAB 500 MG-200 UNIT 1000 MG: 500-200 TAB at 08:44

## 2024-08-26 RX ADMIN — Medication 500 MG: at 08:44

## 2024-08-26 RX ADMIN — ATORVASTATIN CALCIUM 20 MG: 20 TABLET, FILM COATED ORAL at 08:44

## 2024-08-26 RX ADMIN — ARIPIPRAZOLE 10 MG: 10 TABLET ORAL at 08:44

## 2024-08-26 RX ADMIN — LEVOTHYROXINE SODIUM 88 MCG: 0.09 TABLET ORAL at 04:51

## 2024-08-26 RX ADMIN — BUPROPION HYDROCHLORIDE 150 MG: 150 TABLET, FILM COATED, EXTENDED RELEASE ORAL at 08:44

## 2024-08-26 RX ADMIN — BUPROPION HYDROCHLORIDE 300 MG: 300 TABLET, FILM COATED, EXTENDED RELEASE ORAL at 08:44

## 2024-08-26 RX ADMIN — CARVEDILOL 3.12 MG: 3.12 TABLET, FILM COATED ORAL at 08:45

## 2024-08-26 RX ADMIN — CEFAZOLIN 10: 10 INJECTION, POWDER, FOR SOLUTION INTRAVENOUS at 11:55

## 2024-08-30 ENCOUNTER — HOSPITAL ENCOUNTER (OUTPATIENT)
Dept: HOSPITAL 99 - OLABP | Age: 74
End: 2024-08-30
Payer: COMMERCIAL

## 2024-08-30 DIAGNOSIS — E03.9: ICD-10-CM

## 2024-08-30 DIAGNOSIS — E46: ICD-10-CM

## 2024-08-30 DIAGNOSIS — G47.00: ICD-10-CM

## 2024-08-30 DIAGNOSIS — K57.30: ICD-10-CM

## 2024-08-30 DIAGNOSIS — J44.9: ICD-10-CM

## 2024-08-30 DIAGNOSIS — K62.1: ICD-10-CM

## 2024-08-30 DIAGNOSIS — F32.9: ICD-10-CM

## 2024-08-30 DIAGNOSIS — F41.9: Primary | ICD-10-CM

## 2024-08-30 DIAGNOSIS — K50.10: ICD-10-CM

## 2024-08-30 LAB
BUN SERPL-MCNC: 13 MG/DL (ref 9–20)
CALCIUM SERPL-MCNC: 8.6 MG/DL (ref 8.4–10.2)
CHLORIDE SERPL-SCNC: 103 MMOL/L (ref 98–107)
CO2 SERPL-SCNC: 29 MMOL/L (ref 22–30)
EGFR: > 60
ERYTHROCYTE [DISTWIDTH] IN BLOOD BY AUTOMATED COUNT: 16.6 % (ref 11.5–14.5)
GLUCOSE SERPL-MCNC: 73 MG/DL (ref 70–99)
HCT VFR BLD AUTO: 39.9 % (ref 39–52)
HGB BLD-MCNC: 12.6 G/DL (ref 13–18)
MCHC RBC AUTO-ENTMCNC: 31.6 G/DL (ref 33–37)
MCV RBC AUTO: 90.5 FL (ref 80–94)
NRBC BLD AUTO-RTO: 0 %
PLATELET # BLD AUTO: 218 10^3/UL (ref 130–400)
POTASSIUM SERPL-SCNC: 3.9 MMOL/L (ref 3.5–5.1)
SODIUM SERPL-SCNC: 140 MMOL/L (ref 135–145)

## 2024-09-09 ENCOUNTER — HOSPITAL ENCOUNTER (EMERGENCY)
Dept: HOSPITAL 99 - EMR | Age: 74
LOS: 1 days | Discharge: HOME | End: 2024-09-10
Payer: MEDICARE

## 2024-09-09 VITALS — RESPIRATION RATE: 18 BRPM | DIASTOLIC BLOOD PRESSURE: 74 MMHG | SYSTOLIC BLOOD PRESSURE: 117 MMHG

## 2024-09-09 VITALS — DIASTOLIC BLOOD PRESSURE: 92 MMHG | SYSTOLIC BLOOD PRESSURE: 143 MMHG

## 2024-09-09 VITALS — RESPIRATION RATE: 20 BRPM

## 2024-09-09 VITALS — DIASTOLIC BLOOD PRESSURE: 72 MMHG | SYSTOLIC BLOOD PRESSURE: 119 MMHG

## 2024-09-09 VITALS — RESPIRATION RATE: 18 BRPM

## 2024-09-09 VITALS — BODY MASS INDEX: 21.2 KG/M2

## 2024-09-09 VITALS — DIASTOLIC BLOOD PRESSURE: 86 MMHG | SYSTOLIC BLOOD PRESSURE: 131 MMHG

## 2024-09-09 DIAGNOSIS — I25.10: ICD-10-CM

## 2024-09-09 DIAGNOSIS — I42.9: ICD-10-CM

## 2024-09-09 DIAGNOSIS — E78.00: ICD-10-CM

## 2024-09-09 DIAGNOSIS — K52.9: ICD-10-CM

## 2024-09-09 DIAGNOSIS — S09.90XA: Primary | ICD-10-CM

## 2024-09-09 DIAGNOSIS — Z79.82: ICD-10-CM

## 2024-09-09 DIAGNOSIS — Z87.891: ICD-10-CM

## 2024-09-09 DIAGNOSIS — S00.01XA: ICD-10-CM
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
## 2024-09-09 DIAGNOSIS — Z90.79: ICD-10-CM

## 2024-09-09 DIAGNOSIS — R51.9: ICD-10-CM

## 2024-09-09 DIAGNOSIS — F32.A: ICD-10-CM

## 2024-09-09 DIAGNOSIS — Z86.711: ICD-10-CM

## 2024-09-09 DIAGNOSIS — Z87.440: ICD-10-CM

## 2024-09-09 DIAGNOSIS — E03.9: ICD-10-CM

## 2024-09-09 DIAGNOSIS — Z86.19: ICD-10-CM

## 2024-09-09 DIAGNOSIS — I25.2: ICD-10-CM
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
## 2024-09-09 DIAGNOSIS — I48.91: ICD-10-CM

## 2024-09-09 DIAGNOSIS — Z85.46: ICD-10-CM

## 2024-09-09 DIAGNOSIS — K50.90: ICD-10-CM

## 2024-09-09 DIAGNOSIS — F41.9: ICD-10-CM

## 2024-09-09 PROCEDURE — 99284 EMERGENCY DEPT VISIT MOD MDM: CPT

## 2024-09-09 RX ADMIN — ACETAMINOPHEN 650 MG: 325 TABLET ORAL at 22:21

## 2024-09-10 VITALS — RESPIRATION RATE: 20 BRPM

## 2024-09-10 VITALS — DIASTOLIC BLOOD PRESSURE: 94 MMHG | RESPIRATION RATE: 18 BRPM | SYSTOLIC BLOOD PRESSURE: 141 MMHG

## 2024-09-10 VITALS — RESPIRATION RATE: 18 BRPM

## 2024-10-03 ENCOUNTER — HOSPITAL ENCOUNTER (OUTPATIENT)
Dept: HOSPITAL 99 - RST | Age: 74
End: 2024-10-03
Payer: MEDICARE

## 2024-10-03 DIAGNOSIS — R13.10: Primary | ICD-10-CM

## 2024-10-29 ENCOUNTER — HOSPITAL ENCOUNTER (OUTPATIENT)
Dept: HOSPITAL 99 - RST | Age: 74
Discharge: HOME | End: 2024-10-29
Payer: MEDICARE

## 2024-10-29 DIAGNOSIS — R13.10: Primary | ICD-10-CM

## 2024-10-29 DIAGNOSIS — R13.12: ICD-10-CM

## 2024-10-30 ENCOUNTER — TELEMEDICINE (OUTPATIENT)
Dept: PSYCHIATRY | Facility: CLINIC | Age: 74
End: 2024-10-30
Payer: MEDICARE

## 2024-10-30 DIAGNOSIS — F41.1 GENERALIZED ANXIETY DISORDER: ICD-10-CM

## 2024-10-30 DIAGNOSIS — F33.1 MAJOR DEPRESSIVE DISORDER, RECURRENT, MODERATE (HCC): Primary | ICD-10-CM

## 2024-10-30 PROCEDURE — 99214 OFFICE O/P EST MOD 30 MIN: CPT | Performed by: PSYCHIATRY & NEUROLOGY

## 2024-10-30 RX ORDER — BUPROPION HYDROCHLORIDE 150 MG/1
150 TABLET ORAL EVERY MORNING
Qty: 90 TABLET | Refills: 0 | Status: SHIPPED | OUTPATIENT
Start: 2024-10-30

## 2024-10-30 RX ORDER — ARIPIPRAZOLE 10 MG/1
10 TABLET ORAL DAILY
Qty: 90 TABLET | Refills: 0 | Status: SHIPPED | OUTPATIENT
Start: 2024-10-30

## 2024-10-30 RX ORDER — FLUOXETINE 40 MG/1
CAPSULE ORAL
Qty: 180 CAPSULE | Refills: 0 | Status: SHIPPED | OUTPATIENT
Start: 2024-10-30

## 2024-10-30 RX ORDER — BUPROPION HYDROCHLORIDE 300 MG/1
300 TABLET ORAL EVERY MORNING
Qty: 90 TABLET | Refills: 0 | Status: SHIPPED | OUTPATIENT
Start: 2024-10-30

## 2024-10-30 NOTE — BH TREATMENT PLAN
TREATMENT PLAN (Medication Management Only)        [facility]    Name and Date of Birth:  Anibal Quarles 73 y.o. 1950  Date of Treatment Plan: October 30, 2024  Diagnosis/Diagnoses:    1. Major depressive disorder, recurrent, moderate (HCC)    2. Generalized anxiety disorder      Strengths/Personal Resources for Self-Care: supportive family, taking medications as prescribed.  Area/Areas of need (in own words): anxiety, depression  1. Long Term Goal: maintain mood stability.  Target Date:6 months - 4/30/2025  Person/Persons responsible for completion of goal: Anibal  2.  Short Term Objective (s) - How will we reach this goal?:   A. Provider new recommended medication/dosage changes and/or continue medication(s): continue current medications as prescribed Prozac, Wellbutrin XL, Abilify.  B. N/A.  C. N/A.  Target Date:6 months - 4/30/2025  Person/Persons Responsible for Completion of Goal: Anibal  Progress Towards Goals: stable  Treatment Modality: medication management every 3 months  Review due 180 days from date of this plan: 6 months - 4/30/2025  Expected length of service: ongoing treatment  My Physician/PA/NP and I have developed this plan together and I agree to work on the goals and objectives. I understand the treatment goals that were developed for my treatment.

## 2024-10-30 NOTE — PSYCH
Virtual Regular Visit    Verification of patient location:    Patient is located at Home in the following state in which I hold an active license [place]      Assessment/Plan:  Assessment & Plan  Major depressive disorder, recurrent, moderate (HCC)    Orders:    FLUoxetine (PROzac) 40 MG capsule; Take 2 capsules in th evening    ARIPiprazole (ABILIFY) 10 mg tablet; Take 1 tablet (10 mg total) by mouth daily    buPROPion (WELLBUTRIN XL) 150 mg 24 hr tablet; Take 1 tablet (150 mg total) by mouth every morning With a 300mg tab    buPROPion (Wellbutrin XL) 300 mg 24 hr tablet; Take 1 tablet (300 mg total) by mouth every morning With a 150 mg tab    Generalized anxiety disorder  Prozac 80 mg qd          Problem List Items Addressed This Visit    None  Visit Diagnoses       Major depressive disorder, recurrent, moderate (HCC)    -  Primary    Generalized anxiety disorder                Goals addressed in session: Goal 1          Reason for visit is   Chief Complaint   Patient presents with    Medication Management        Encounter provider Jaqui Barker MD      Recent Visits  No visits were found meeting these conditions.  Showing recent visits within past 7 days and meeting all other requirements  Today's Visits  Date Type Provider Dept   10/30/24 Telemedicine Jaqui Barker MD [facility]   Showing today's visits and meeting all other requirements  Future Appointments  No visits were found meeting these conditions.  Showing future appointments within next 150 days and meeting all other requirements       The patient was identified by name and date of birth. Anibal Quarles was informed that this is a telemedicine visit and that the visit is being conducted throughthe Epic Embedded platform. He agrees to proceed..  My office door was closed. No one else was in the room.  He acknowledged consent and understanding of privacy and security of the video platform. The patient has agreed to participate and understands they  "can discontinue the visit at any time.    Patient is aware this is a billable service.     Subjective  Anibal Quarles is a 73 y.o. male with depression and anxiety presents for regular f/u  .  Compliant with meds, denies SE  Pt was hospitalized in August for UTI - rehab - back home since mid September . Blood work by PCP.  Pt still c/o poor balance; limited functioning; walks with a walker \" a little\". Pt is able to do some ADLs and take care of himself.  I reviewed the chart  HPI   Mood - \" same\"; not depressed; denies mood swings. Low energy and limited functioning ( chronic, medical); social with family  Anxiety - denies panic attacks or racing thoughts  Psychosis - denies AH or delusions  Sleep - 10 hrs; good  Past Medical History:   Diagnosis Date    CIDP (chronic inflammatory demyelinating polyneuropathy) (HCC)     Crohn disease (HCC)     Degenerative disc disease, lumbar     Disease of thyroid gland     GERD (gastroesophageal reflux disease)     Myocardial infarction (HCC)     Psychiatric disorder        Past Surgical History:   Procedure Laterality Date    LAPAROSCOPIC APPENDECTOMY      PEG TUBE PLACEMENT      PROSTATECTOMY         Current Outpatient Medications   Medication Sig Dispense Refill    alendronate (Fosamax) 70 mg tablet Take 1 tablet by mouth Once a week      ARIPiprazole (ABILIFY) 10 mg tablet Take 1 tablet (10 mg total) by mouth daily 90 tablet 0    aspirin (ECOTRIN LOW STRENGTH) 81 mg EC tablet Take 1 tablet by mouth Daily      atorvastatin (LIPITOR) 20 mg tablet TAKE 1 TABLET BY MOUTH EVERY DAY WITH DINNER 30 tablet 0    buPROPion (WELLBUTRIN XL) 150 mg 24 hr tablet Take 1 tablet (150 mg total) by mouth every morning With a 300mg tab 90 tablet 0    Calcium Carb-Cholecalciferol (Calcium-Vitamin D3) 600-10 MG-MCG CAPS every 24 hours      FLUoxetine (PROzac) 40 MG capsule Take 2 capsules in th evening 180 capsule 0    levothyroxine 88 mcg tablet Take 1 tablet (88 mcg total) by mouth daily in the " early morning 30 tablet 0    Potassium Chloride ER 20 MEQ TBCR Take 1 tablet by mouth daily With food      alendronate (FOSAMAX) 70 mg tablet TAKE 1 TABLET BY MOUTH ONCE WEEKLY IN THE MORNING AT LEAST 30 MINUTES BEFORE FIRST FOOD, BEVERAGE OR MEDICATION OF THE DAY      aspirin (Aspirin 81) 81 mg chewable tablet Chew      bacitracin topical ointment 500 units/g topical ointment Apply 1 large application topically 2 (two) times a day 1 large application topically twice a day to PEG tube area 15 g 0    buPROPion (Wellbutrin XL) 300 mg 24 hr tablet Take 1 tablet (300 mg total) by mouth every morning With a 150 mg tab 90 tablet 0    carvedilol (COREG) 3.125 mg tablet Take 1 tablet (3.125 mg total) by mouth 2 (two) times a day with meals 30 tablet 0    carvedilol (COREG) 6.25 mg tablet Take 6.25 mg by mouth 2 (two) times a day      cholecalciferol (VITAMIN D) 400 units/1 mL Take by mouth      [employer] B-12 500 MCG tablet TAKE 1 TABLET BY MOUTH DAILY 30 tablet 0    gabapentin (NEURONTIN) 100 mg capsule TAKE 2 CAPSULES BY MOUTH 3 TIMES A DAY 90 capsule 1    hydrocortisone (ANUSOL-HC) 25 mg suppository Insert 1 suppository (25 mg total) into the rectum 2 (two) times a day 12 suppository 0    hydrocortisone 1 % cream Apply topically 3 (three) times a day 30 g 0    immune globulin, human 20 GM/200ML       meloxicam (MOBIC) 15 mg tablet Take 1 tablet by mouth Daily      pantoprazole (PROTONIX) 40 mg tablet Take 1 tablet (40 mg total) by mouth daily in the early morning 30 tablet 0    saccharomyces boulardii (FLORASTOR) 250 mg capsule Take 1 capsule (250 mg total) by mouth 2 (two) times a day 60 capsule 0    sulfaSALAzine (AZULFIDINE) 500 mg tablet        No current facility-administered medications for this visit.        No Known Allergies    Review of Systems   Constitutional:  Negative for activity change and appetite change.   Neurological:         Poor balance   Psychiatric/Behavioral:  Negative for dysphoric mood, sleep  disturbance and suicidal ideas. The patient is not nervous/anxious.        Video Exam    There were no vitals filed for this visit.    Physical Exam  Constitutional:       Appearance: Normal appearance. He is normal weight.   Neurological:      Mental Status: He is alert.   Psychiatric:         Attention and Perception: Attention and perception normal.         Mood and Affect: Mood is not anxious or depressed. Affect is not blunt.         Behavior: Behavior normal. Behavior is cooperative.         Thought Content: Thought content normal.         Judgment: Judgment normal.      Comments: Monotone slow speech - baseline          Visit Time    Visit Start Time: 1.58 pm   Visit Stop Time: 2.20 pm   Total Visit Duration:  22 minutes

## 2024-11-26 ENCOUNTER — TELEPHONE (OUTPATIENT)
Dept: PSYCHIATRY | Facility: CLINIC | Age: 74
End: 2024-11-26

## 2024-11-26 NOTE — TELEPHONE ENCOUNTER
Called and left message for client offering follow-up medication check appt. with Dr. Jaqui Barker on 1/22/25 at 2:00 pm.

## 2024-11-29 ENCOUNTER — HOSPITAL ENCOUNTER (OUTPATIENT)
Dept: HOSPITAL 99 - RST | Age: 74
Discharge: HOME | End: 2024-11-29
Payer: MEDICARE

## 2024-11-29 DIAGNOSIS — R13.10: Primary | ICD-10-CM

## 2024-11-29 DIAGNOSIS — R13.12: ICD-10-CM

## 2024-12-03 ENCOUNTER — HOSPITAL ENCOUNTER (OUTPATIENT)
Dept: HOSPITAL 99 - RAD | Age: 74
End: 2024-12-03
Payer: MEDICARE

## 2024-12-03 DIAGNOSIS — J44.9: ICD-10-CM

## 2024-12-03 DIAGNOSIS — R13.10: Primary | ICD-10-CM

## 2024-12-03 DIAGNOSIS — J98.4: ICD-10-CM

## 2025-01-20 ENCOUNTER — TELEPHONE (OUTPATIENT)
Dept: PSYCHIATRY | Facility: CLINIC | Age: 75
End: 2025-01-20

## 2025-01-20 NOTE — TELEPHONE ENCOUNTER
Writer called and left voicemail for client informing him that his yearly forms will be due for his appointment 1/22/25 and asked him to call back if he was having any issues finding them.

## 2025-01-29 ENCOUNTER — TELEPHONE (OUTPATIENT)
Dept: PSYCHIATRY | Facility: CLINIC | Age: 75
End: 2025-01-29

## 2025-01-29 ENCOUNTER — TELEMEDICINE (OUTPATIENT)
Dept: PSYCHIATRY | Facility: CLINIC | Age: 75
End: 2025-01-29
Payer: MEDICARE

## 2025-01-29 DIAGNOSIS — F41.1 GENERALIZED ANXIETY DISORDER: ICD-10-CM

## 2025-01-29 DIAGNOSIS — F33.1 MAJOR DEPRESSIVE DISORDER, RECURRENT, MODERATE (HCC): Primary | ICD-10-CM

## 2025-01-29 PROCEDURE — 99214 OFFICE O/P EST MOD 30 MIN: CPT | Performed by: PSYCHIATRY & NEUROLOGY

## 2025-01-29 RX ORDER — FLUOXETINE 40 MG/1
CAPSULE ORAL
Qty: 180 CAPSULE | Refills: 0 | Status: SHIPPED | OUTPATIENT
Start: 2025-01-29

## 2025-01-29 RX ORDER — BUPROPION HYDROCHLORIDE 150 MG/1
150 TABLET ORAL EVERY MORNING
Qty: 90 TABLET | Refills: 0 | Status: SHIPPED | OUTPATIENT
Start: 2025-01-29

## 2025-01-29 RX ORDER — ARIPIPRAZOLE 10 MG/1
10 TABLET ORAL DAILY
Qty: 90 TABLET | Refills: 0 | Status: SHIPPED | OUTPATIENT
Start: 2025-01-29

## 2025-01-29 RX ORDER — BUPROPION HYDROCHLORIDE 300 MG/1
300 TABLET ORAL EVERY MORNING
Qty: 90 TABLET | Refills: 0 | Status: SHIPPED | OUTPATIENT
Start: 2025-01-29

## 2025-01-29 NOTE — PSYCH
Virtual Regular Visit    Verification of patient location:    Patient is located at Home in the following state in which I hold an active license [place]      Assessment/Plan:  Assessment & Plan  Major depressive disorder, recurrent, moderate (HCC)    Orders:    buPROPion (Wellbutrin XL) 300 mg 24 hr tablet; Take 1 tablet (300 mg total) by mouth every morning With a 150 mg tab    buPROPion (WELLBUTRIN XL) 150 mg 24 hr tablet; Take 1 tablet (150 mg total) by mouth every morning With a 300mg tab    ARIPiprazole (ABILIFY) 10 mg tablet; Take 1 tablet (10 mg total) by mouth daily    FLUoxetine (PROzac) 40 MG capsule; Take 2 capsules in th evening    Generalized anxiety disorder  Continue current meds          Problem List Items Addressed This Visit    None  Visit Diagnoses         Major depressive disorder, recurrent, moderate (HCC)    -  Primary      Generalized anxiety disorder                Goals addressed in session: Goal 1     Depression Follow-up Plan Completed: Not applicable    Reason for visit is   Chief Complaint   Patient presents with    Medication Management        Encounter provider Jaqui Barker MD      Recent Visits  No visits were found meeting these conditions.  Showing recent visits within past 7 days and meeting all other requirements  Today's Visits  Date Type Provider Dept   01/29/25 Telemedicine Jaqui Barker MD [facility]   Showing today's visits and meeting all other requirements  Future Appointments  No visits were found meeting these conditions.  Showing future appointments within next 150 days and meeting all other requirements       The patient was identified by name and date of birth. Anibal Quarles was informed that this is a telemedicine visit and that the visit is being conducted throughthe Epic Embedded platform. He agrees to proceed..  My office door was closed. No one else was in the room.  He acknowledged consent and understanding of privacy and security of the video platform.  "The patient has agreed to participate and understands they can discontinue the visit at any time.    Patient is aware this is a billable service.     Subjective  Anibal Quarles is a 74 y.o. male with depression and anxiety presents for regular f/u  .  Compliant with meds, denies SE  S/p fall on Sunday; hit his head; \" black eye\"  I reviewed the chart  Pt stays at home, walks with a walker; poor balance. He likes watching TV, spends time with family  Multiple chronic medical problems  HPI   Mood - \" same\"; limited functioning b/o medical problems; low energy and low motivation. Stays at home; social with family  Anxiety - controlled; denies panic attacks or racing thoughts  Sleep - good  Past Medical History:   Diagnosis Date    CIDP (chronic inflammatory demyelinating polyneuropathy) ([facility])     Crohn disease (HCC)     Degenerative disc disease, lumbar     Disease of thyroid gland     GERD (gastroesophageal reflux disease)     Myocardial infarction (HCC)     Psychiatric disorder        Past Surgical History:   Procedure Laterality Date    LAPAROSCOPIC APPENDECTOMY      PEG TUBE PLACEMENT      PROSTATECTOMY         Current Outpatient Medications   Medication Sig Dispense Refill    alendronate (FOSAMAX) 70 mg tablet TAKE 1 TABLET BY MOUTH ONCE WEEKLY IN THE MORNING AT LEAST 30 MINUTES BEFORE FIRST FOOD, BEVERAGE OR MEDICATION OF THE DAY      alendronate (Fosamax) 70 mg tablet Take 1 tablet by mouth Once a week      ARIPiprazole (ABILIFY) 10 mg tablet Take 1 tablet (10 mg total) by mouth daily 90 tablet 0    aspirin (Aspirin 81) 81 mg chewable tablet Chew      aspirin (ECOTRIN LOW STRENGTH) 81 mg EC tablet Take 1 tablet by mouth Daily      atorvastatin (LIPITOR) 20 mg tablet TAKE 1 TABLET BY MOUTH EVERY DAY WITH DINNER 30 tablet 0    bacitracin topical ointment 500 units/g topical ointment Apply 1 large application topically 2 (two) times a day 1 large application topically twice a day to PEG tube area 15 g 0    buPROPion " (WELLBUTRIN XL) 150 mg 24 hr tablet Take 1 tablet (150 mg total) by mouth every morning With a 300mg tab 90 tablet 0    buPROPion (Wellbutrin XL) 300 mg 24 hr tablet Take 1 tablet (300 mg total) by mouth every morning With a 150 mg tab 90 tablet 0    Calcium Carb-Cholecalciferol (Calcium-Vitamin D3) 600-10 MG-MCG CAPS every 24 hours      carvedilol (COREG) 3.125 mg tablet Take 1 tablet (3.125 mg total) by mouth 2 (two) times a day with meals 30 tablet 0    carvedilol (COREG) 6.25 mg tablet Take 6.25 mg by mouth 2 (two) times a day      cholecalciferol (VITAMIN D) 400 units/1 mL Take by mouth      [employer]S B-12 500 MCG tablet TAKE 1 TABLET BY MOUTH DAILY 30 tablet 0    FLUoxetine (PROzac) 40 MG capsule Take 2 capsules in th evening 180 capsule 0    gabapentin (NEURONTIN) 100 mg capsule TAKE 2 CAPSULES BY MOUTH 3 TIMES A DAY 90 capsule 1    hydrocortisone (ANUSOL-HC) 25 mg suppository Insert 1 suppository (25 mg total) into the rectum 2 (two) times a day 12 suppository 0    hydrocortisone 1 % cream Apply topically 3 (three) times a day 30 g 0    immune globulin, human 20 GM/200ML       levothyroxine 88 mcg tablet Take 1 tablet (88 mcg total) by mouth daily in the early morning 30 tablet 0    meloxicam (MOBIC) 15 mg tablet Take 1 tablet by mouth Daily      pantoprazole (PROTONIX) 40 mg tablet Take 1 tablet (40 mg total) by mouth daily in the early morning 30 tablet 0    Potassium Chloride ER 20 MEQ TBCR Take 1 tablet by mouth daily With food      saccharomyces boulardii (FLORASTOR) 250 mg capsule Take 1 capsule (250 mg total) by mouth 2 (two) times a day 60 capsule 0    sulfaSALAzine (AZULFIDINE) 500 mg tablet        No current facility-administered medications for this visit.        No Known Allergies    Review of Systems   Constitutional:  Negative for activity change and appetite change.   Psychiatric/Behavioral:  Negative for dysphoric mood, sleep disturbance and suicidal ideas. The patient is not nervous/anxious.   "      Video Exam    There were no vitals filed for this visit.    Physical Exam  Constitutional:       Appearance: He is normal weight.      Comments: \" Black\" R eye; s/p fall   Neurological:      Mental Status: He is alert.   Psychiatric:         Attention and Perception: Attention and perception normal.         Mood and Affect: Mood is depressed. Mood is not anxious. Affect is blunt.         Behavior: Behavior normal. Behavior is cooperative.         Thought Content: Thought content normal.         Judgment: Judgment normal.      Comments: Monotone slow speech - baseline          Visit Time  Face to face  Visit Start Time: 3.30 pm   Visit Stop Time: 3.37 pm   Total Visit Duration:  20 minutes total spent in patient care        "

## 2025-01-29 NOTE — TELEPHONE ENCOUNTER
Writer left voicemail scheduling client's 3 month follow up with Dr. Barker for client's most common appt time of 2 pm on Wednesday (4/30/25).    Client advised [facility] New Patient forms are in his document center to sign on the MyChart and to call back if he has any questions or needs a different date or time for his appointment.

## 2025-02-05 ENCOUNTER — HOSPITAL ENCOUNTER (EMERGENCY)
Dept: HOSPITAL 99 - EMR | Age: 75
Discharge: HOME | End: 2025-02-05
Payer: MEDICARE

## 2025-02-05 VITALS — SYSTOLIC BLOOD PRESSURE: 125 MMHG | RESPIRATION RATE: 16 BRPM | DIASTOLIC BLOOD PRESSURE: 81 MMHG
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
## 2025-02-05 DIAGNOSIS — K50.90: ICD-10-CM

## 2025-02-05 DIAGNOSIS — I25.10: ICD-10-CM

## 2025-02-05 DIAGNOSIS — Z90.49: ICD-10-CM

## 2025-02-05 DIAGNOSIS — E78.00: ICD-10-CM

## 2025-02-05 DIAGNOSIS — I48.91: ICD-10-CM

## 2025-02-05 DIAGNOSIS — S09.90XA: Primary | ICD-10-CM

## 2025-02-05 DIAGNOSIS — Z90.79: ICD-10-CM

## 2025-02-05 DIAGNOSIS — I21.9: ICD-10-CM

## 2025-02-05 DIAGNOSIS — Z87.891: ICD-10-CM

## 2025-02-05 DIAGNOSIS — F41.8: ICD-10-CM

## 2025-02-05 DIAGNOSIS — E03.9: ICD-10-CM

## 2025-02-05 PROCEDURE — 99284 EMERGENCY DEPT VISIT MOD MDM: CPT

## 2025-02-25 DIAGNOSIS — Z79.899 ENCOUNTER FOR LONG-TERM (CURRENT) USE OF MEDICATIONS: Primary | ICD-10-CM

## 2025-03-27 ENCOUNTER — TELEPHONE (OUTPATIENT)
Age: 75
End: 2025-03-27

## 2025-03-27 NOTE — TELEPHONE ENCOUNTER
Writer rec a call from pts daughter regarding speaking to provider before pts next appt. Daughter stated that pt is having short term memory issues and wanted provider to be aware. Pt sees a Neurologist and will reach out to them also.

## 2025-03-27 NOTE — TELEPHONE ENCOUNTER
Spoke with Madonna when call was transferred. She won't be able to attend Klaus's appointment with Dr. Barker 4/30/25, but wanted to share their concerns as she doesn't think he will bring them up at the apt. They're noticing difficulty with Klaus's short term memory. Yesterday Klaus's wife, Tiera went to a GI apt she had waited for for 5 months. She had made sure Klaus had been cared for before she left and wanted him to not walk around while she was gone. He went to use the bathroom and fell, hitting his head.     Madonna said she, Tiera and the nurse (who has been giving Klaus his infusion for 5 years) are all noticing this. They did talk with the PCP and oncologist and plan to talk with neurologist at that next apt. Wanted inform Dr. Barker before the apt.     Best number for Madonna, should additional information be needed. [phone]

## 2025-03-27 NOTE — TELEPHONE ENCOUNTER
Patients daughter returned call.     Writer warm transferred to clinical staff for further assistance.

## 2025-04-25 ENCOUNTER — TELEPHONE (OUTPATIENT)
Dept: PSYCHIATRY | Facility: CLINIC | Age: 75
End: 2025-04-25

## 2025-04-25 NOTE — TELEPHONE ENCOUNTER
Writer left voicemail for client informing him of the [facility] New Patient forms for his upcoming appointment with Dr. Barker.

## 2025-04-30 ENCOUNTER — TELEPHONE (OUTPATIENT)
Dept: PSYCHIATRY | Facility: CLINIC | Age: 75
End: 2025-04-30

## 2025-04-30 NOTE — TELEPHONE ENCOUNTER
Patient contacted the office to schedule a follow up visit with provider. Patient is now scheduled for 5/1/25  at 1pm virtually.     Provider has been made aware of appointment.

## 2025-04-30 NOTE — TELEPHONE ENCOUNTER
Writer left voicemail for client to reschedule 4/30/25 appointment with Dr. Barker as there were technical issues not allowing him to connect.     Writer gave client [facility] Help[employer]k number as well.    When client calls back, please let him know that writer has seen some cases where connecting directly from logging into the [facility] anish/website will connect when clicking on the link does not.

## 2025-05-01 ENCOUNTER — TELEPHONE (OUTPATIENT)
Dept: PSYCHIATRY | Facility: CLINIC | Age: 75
End: 2025-05-01

## 2025-05-01 ENCOUNTER — TELEMEDICINE (OUTPATIENT)
Dept: PSYCHIATRY | Facility: CLINIC | Age: 75
End: 2025-05-01
Payer: MEDICARE

## 2025-05-01 DIAGNOSIS — F41.1 GENERALIZED ANXIETY DISORDER: Primary | ICD-10-CM

## 2025-05-01 DIAGNOSIS — F33.1 MAJOR DEPRESSIVE DISORDER, RECURRENT, MODERATE (HCC): ICD-10-CM

## 2025-05-01 PROCEDURE — 99214 OFFICE O/P EST MOD 30 MIN: CPT | Performed by: PSYCHIATRY & NEUROLOGY

## 2025-05-01 RX ORDER — ARIPIPRAZOLE 10 MG/1
10 TABLET ORAL DAILY
Qty: 90 TABLET | Refills: 0 | Status: SHIPPED | OUTPATIENT
Start: 2025-05-01

## 2025-05-01 RX ORDER — BUPROPION HYDROCHLORIDE 150 MG/1
150 TABLET ORAL EVERY MORNING
Qty: 90 TABLET | Refills: 0 | Status: SHIPPED | OUTPATIENT
Start: 2025-05-01

## 2025-05-01 RX ORDER — BUPROPION HYDROCHLORIDE 300 MG/1
300 TABLET ORAL EVERY MORNING
Qty: 90 TABLET | Refills: 0 | Status: SHIPPED | OUTPATIENT
Start: 2025-05-01

## 2025-05-01 RX ORDER — FLUOXETINE HYDROCHLORIDE 40 MG/1
CAPSULE ORAL
Qty: 180 CAPSULE | Refills: 0 | Status: SHIPPED | OUTPATIENT
Start: 2025-05-01

## 2025-05-01 NOTE — PSYCH
"MEDICATION MANAGEMENT NOTE    Name: Anibal Quarles      : 1950      MRN: [id number]  Encounter Provider: Jaqui Barker MD  Encounter Date: 2025   Encounter department: [facility] OUTPATIENT    Insurance: Payor: MEDICARE / Plan: MEDICARE A AND B / Product Type: Medicare A & B Fee for Service /      Reason for Visit:   Chief Complaint   Patient presents with    Medication Management   :  Assessment & Plan  Major depressive disorder, recurrent, moderate (HCC)         Generalized anxiety disorder             Treatment Recommendations:    Educated about diagnosis and treatment modalities. Verbalizes understanding and agreement with the treatment plan.  Discussed self monitoring of symptoms, and symptom monitoring tools.  Discussed medications and if treatment adjustment was needed or desired.  I am scheduling this patient out for greater than 3 months: No    Medications Risks/Benefits:      Risks, Benefits And Possible Side Effects Of Medications:    Risks, benefits, and possible side effects of medications explained to Klaus and he (or legal representative) verbalizes understanding and agreement for treatment.    Controlled Medication Discussion:     Not applicable      History of Present Illness       Pt presents for regular f/u .  Compliant with meds, denies SE.  Pt is a poor historian, collateral obtained from his wife, who was present during the interview.  Neurology f/u for neuropathy; pt continues to fall 1 x week - no injuries.  Limited functioning; stays mostly inside; sits outside in warm weather.  Mood  - \" same\"; sometimes gets upset and frustrated, but denies anger outbursts. Limited ADLs; social with family.  Anxiety - controlled; denies panic attacks or racing thoughts.  Sleep, appetite - normal  Review Of Systems: A review of systems is obtained and is negative except for the pertinent positives listed in HPI/Subjective above.      Current Rating Scores: "         Areas of Improvement: reviewed in HPI/Subjective Section      Past Medical History:   Diagnosis Date    CIDP (chronic inflammatory demyelinating polyneuropathy) (HCC)     Crohn disease (HCC)     Degenerative disc disease, lumbar     Disease of thyroid gland     GERD (gastroesophageal reflux disease)     Myocardial infarction (HCC)     Psychiatric disorder      Past Surgical History:   Procedure Laterality Date    LAPAROSCOPIC APPENDECTOMY      PEG TUBE PLACEMENT      PROSTATECTOMY       Allergies: No Known Allergies    Current Outpatient Medications   Medication Instructions    alendronate (FOSAMAX) 70 mg tablet TAKE 1 TABLET BY MOUTH ONCE WEEKLY IN THE MORNING AT LEAST 30 MINUTES BEFORE FIRST FOOD, BEVERAGE OR MEDICATION OF THE DAY    alendronate (Fosamax) 70 mg tablet 1 tablet, Oral, Weekly    ARIPiprazole (ABILIFY) 10 mg, Oral, Daily    aspirin (Aspirin 81) 81 mg chewable tablet Oral    aspirin (ECOTRIN LOW STRENGTH) 81 mg EC tablet 1 tablet, Oral, Daily    atorvastatin (LIPITOR) 20 mg tablet TAKE 1 TABLET BY MOUTH EVERY DAY WITH DINNER    bacitracin topical ointment 500 units/g topical ointment 1 large application, Topical, 2 times daily, 1 large application topically twice a day to PEG tube area    buPROPion (WELLBUTRIN XL) 300 mg, Oral, Every morning, With a 150 mg tab    buPROPion (WELLBUTRIN XL) 150 mg, Oral, Every morning, With a 300mg tab    Calcium Carb-Cholecalciferol (Calcium-Vitamin D3) 600-10 MG-MCG CAPS Every 24 hours    carvedilol (COREG) 3.125 mg, Oral, 2 times daily with meals    carvedilol (COREG) 6.25 mg, Oral, 2 times daily    cholecalciferol (VITAMIN D) 400 units/1 mL Oral    [employer] B-12 500 MCG tablet TAKE 1 TABLET BY MOUTH DAILY    FLUoxetine (PROzac) 40 MG capsule Take 2 capsules in th evening    gabapentin (NEURONTIN) 100 mg capsule TAKE 2 CAPSULES BY MOUTH 3 TIMES A DAY    hydrocortisone (ANUSOL-HC) 25 mg, Rectal, 2 times daily    hydrocortisone 1 % cream Topical, 3 times daily     immune globulin, human 20 GM/200ML No dose, route, or frequency recorded.    levothyroxine 88 mcg, Oral, Daily (early morning)    meloxicam (MOBIC) 15 mg tablet 1 tablet, Oral, Daily    pantoprazole (PROTONIX) 40 mg, Oral, Daily (early morning)    Potassium Chloride ER 20 MEQ TBCR 1 tablet, Oral, Daily, With food    saccharomyces boulardii (FLORASTOR) 250 mg, Oral, 2 times daily    sulfaSALAzine (AZULFIDINE) 500 mg tablet No dose, route, or frequency recorded.        Substance Abuse History:    Tobacco, Alcohol and Drug Use History     Tobacco Use    Smoking status: Former    Smokeless tobacco: Never   Vaping Use    Vaping status: Never Used   Substance Use Topics    Alcohol use: Not Currently    Drug use: Not Currently          Social History:    Social History     Socioeconomic History    Marital status: /Civil Union     Spouse name: Not on file    Number of children: Not on file    Years of education: Not on file    Highest education level: Not on file   Occupational History    Not on file   Other Topics Concern    Not on file   Social History Narrative    Not on file        Family Psychiatric History:     History reviewed. No pertinent family history.    Medical History Reviewed by provider this encounter:  Tobacco  Allergies  Meds  Problems  Med Hx  Surg Hx  Fam Hx          Objective   There were no vitals taken for this visit.     Mental Status Evaluation:    Appearance age appropriate, casually dressed   Behavior cooperative   Speech Not very talkative, normal volume, slow   Mood euthymic   Affect constricted   Thought Processes organized   Thought Content no overt delusions   Perceptual Disturbances: no auditory hallucinations, no visual hallucinations   Abnormal Thoughts  Risk Potential Suicidal ideation - None  Homicidal ideation - None  Potential for aggression - No   Orientation normal   Memory recent and remote memory grossly intact   Consciousness alert and awake   Attention Span  Concentration Span attention span and concentration are age appropriate   Intellect appears to be of average intelligence   Insight limited   Judgement limited   Muscle Strength and  Gait unable to assess today due to virtual visit   Motor activity unable to assess today due to virtual visit   Language normal   Fund of Knowledge adequate knowledge of current events       Laboratory Results: I have personally reviewed all pertinent laboratory/tests results        Suicide/Homicide Risk Assessment:    Risk of Harm to Self:  Based on today's assessment, Klaus presents the following risk of harm to self: none    Risk of Harm to Others:  Based on today's assessment, Klaus presents the following risk of harm to others: none    The following interventions are recommended: Continue medication management.    Psychotherapy Provided:     Individual psychotherapy provided: No    Treatment Plan:    Completed and signed during the session: Yes - Treatment Plan done but not signed at time of office visit due to: Plan reviewed by video and verbal consent given due to virtual visit. Treatment Plan sent to patient via [employer] for signature.    Goals: Progress towards Treatment Plan goals -  stable.    Depression Follow-up Plan Completed: Not applicable    Note Share:        Administrative Statements   Administrative Statements   Encounter provider Jaqui Barker MD    The Patient is located at Home and in the following state in which I hold an active license [place].    The patient was identified by name and date of birth. Anibal Quarles was informed that this is a telemedicine visit and that the visit is being conducted through the Epic Embedded platform. He agrees to proceed..  My office door was closed. No one else was in the room.  He acknowledged consent and understanding of privacy and security of the video platform. The patient has agreed to participate and understands they can discontinue the visit at any time.    I have spent a total  time of 25 minutes in caring for this patient on the day of the visit/encounter including Risks and benefits of tx options, Instructions for management, Documenting in the medical record, and Reviewing/placing orders in the medical record (including tests, medications, and/or procedures), not including the time spent for establishing the audio/video connection.    Visit Time  Face to face  Visit Start Time: 1.00 pm   Visit Stop Time: 1.10 pm   Total Visit Duration:  25 minutes total spent in patient care        Jaqui Barker MD 05/01/25

## 2025-05-01 NOTE — TELEPHONE ENCOUNTER
Writer left voicemail for client to schedule 3 month follow up with Dr. Barker on both machines (per note)

## 2025-05-01 NOTE — BH TREATMENT PLAN
TREATMENT PLAN (Medication Management Only)        [facility]    Name and Date of Birth:  Anibal Quarles 74 y.o. 1950  MRN: [id number]  Date of Treatment Plan: May 1, 2025  Diagnosis/Diagnoses:    1. Generalized anxiety disorder    2. Major depressive disorder, recurrent, moderate (HCC)      Strengths/Personal Resources for Self-Care: supportive family, taking medications as prescribed.  Area/Areas of need (in own words): anxiety, depression  1. Long Term Goal:   maintain mood stability.  Target Date:6 months - 11/1/2025  Person/Persons responsible for completion of goal: Anibal  2.  Short Term Objective (s) - How will we reach this goal?:   A.  Provider new recommended medication/dosage changes and/or continue medication(s): continue current medications as prescribed Prozac, Wellbutrin XL, and Abilify.  B.  N/A.  C.  N/A.  Target Date:6 months - 11/1/2025  Person/Persons Responsible for Completion of Goal: Anibal  Progress Towards Goals: stable  Treatment Modality: medication management every 2 months  Review due 180 days from date of this plan: 6 months - 11/1/2025  Expected length of service: ongoing treatment unless revised  My Physician/PA/NP and I have developed this plan together and I agree to work on the goals and objectives. I understand the treatment goals that were developed for my treatment.   Electronic Signatures: on file (unless signed below)    Jaqui Barker MD 05/01/25

## 2025-05-02 ENCOUNTER — TELEPHONE (OUTPATIENT)
Age: 75
End: 2025-05-02

## 2025-06-02 ENCOUNTER — HOSPITAL ENCOUNTER (OUTPATIENT)
Dept: HOSPITAL 99 - HWRCS | Age: 75
End: 2025-06-02
Payer: MEDICARE

## 2025-06-02 DIAGNOSIS — I51.81: ICD-10-CM

## 2025-06-02 DIAGNOSIS — I48.0: Primary | ICD-10-CM

## 2025-07-25 ENCOUNTER — TELEPHONE (OUTPATIENT)
Dept: PSYCHIATRY | Facility: CLINIC | Age: 75
End: 2025-07-25

## 2025-07-25 ENCOUNTER — APPOINTMENT (EMERGENCY)
Age: 75
End: 2025-07-25
Payer: MEDICARE

## 2025-07-25 ENCOUNTER — HOSPITAL ENCOUNTER (EMERGENCY)
Age: 75
Discharge: HOME | End: 2025-07-25
Payer: MEDICARE

## 2025-07-25 ENCOUNTER — TELEPHONE (OUTPATIENT)
Age: 75
End: 2025-07-25

## 2025-07-25 VITALS
RESPIRATION RATE: 23 BRPM | HEART RATE: 75 BPM | DIASTOLIC BLOOD PRESSURE: 74 MMHG | BODY MASS INDEX: 21.91 KG/M2 | HEIGHT: 73 IN | OXYGEN SATURATION: 94 % | TEMPERATURE: 98 F | WEIGHT: 165.34 LBS | SYSTOLIC BLOOD PRESSURE: 130 MMHG

## 2025-07-25 DIAGNOSIS — R31.29 MICROSCOPIC HEMATURIA: ICD-10-CM
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
## 2025-07-25 DIAGNOSIS — S00.81XA ABRASION OF FACE, INITIAL ENCOUNTER: ICD-10-CM

## 2025-07-25 DIAGNOSIS — S41.111A ARM LACERATION, RIGHT, INITIAL ENCOUNTER: ICD-10-CM

## 2025-07-25 DIAGNOSIS — S09.90XA INJURY OF HEAD, INITIAL ENCOUNTER: ICD-10-CM

## 2025-07-25 LAB
ALBUMIN SERPL BCG-MCNC: 3.8 G/DL (ref 3.5–5.2)
ALP SERPL-CCNC: 60 U/L (ref 35–160)
ALT SERPL W P-5'-P-CCNC: 27 U/L (ref 5–33)
ANION GAP SERPL CALCULATED.3IONS-SCNC: 11 MMOL/L (ref 7–16)
APTT PPP: 24 SECONDS (ref 23–34)
AST SERPL W P-5'-P-CCNC: 35 U/L (ref 5–40)
BACTERIA UR QL AUTO: ABNORMAL /HPF
BASOPHILS # BLD AUTO: 0.02 THOUSANDS/ÂΜL (ref 0–0.1)
BASOPHILS NFR BLD AUTO: 0 % (ref 0–1)
BILIRUB SERPL-MCNC: 0.41 MG/DL (ref 0.2–1.2)
BILIRUB UR QL STRIP: NEGATIVE
BUN SERPL-MCNC: 21 MG/DL (ref 8–23)
CALCIUM SERPL-MCNC: 9.3 MG/DL (ref 8.6–10.2)
CHLORIDE SERPL-SCNC: 104 MMOL/L (ref 98–107)
CLARITY UR: CLEAR
CO2 SERPL-SCNC: 26 MMOL/L (ref 22–29)
COLOR UR: YELLOW
CREAT SERPL-MCNC: 0.89 MG/DL (ref 0.7–1.2)
EOSINOPHIL # BLD AUTO: 0.03 THOUSAND/ÂΜL (ref 0–0.61)
EOSINOPHIL NFR BLD AUTO: 1 % (ref 0–6)
ERYTHROCYTE [DISTWIDTH] IN BLOOD BY AUTOMATED COUNT: 15 % (ref 11.6–15.1)
GFR SERPL CREATININE-BSD FRML MDRD: 85 ML/MIN/1.73SQ M
GLUCOSE SERPL-MCNC: 85 MG/DL (ref 65–140)
GLUCOSE UR STRIP-MCNC: NEGATIVE MG/DL
HCT VFR BLD AUTO: 42.8 % (ref 36.5–49.3)
HGB BLD-MCNC: 13.9 G/DL (ref 12–17)
HGB UR QL STRIP.AUTO: ABNORMAL
HYALINE CASTS #/AREA URNS LPF: ABNORMAL /LPF
IMM GRANULOCYTES # BLD AUTO: 0.02 THOUSAND/UL (ref 0–0.2)
IMM GRANULOCYTES NFR BLD AUTO: 0 % (ref 0–2)
INR PPP: 1.09 (ref 0.85–1.19)
KETONES UR STRIP-MCNC: NEGATIVE MG/DL
LEUKOCYTE ESTERASE UR QL STRIP: NEGATIVE
LYMPHOCYTES # BLD AUTO: 1 THOUSANDS/ÂΜL (ref 0.6–4.47)
LYMPHOCYTES NFR BLD AUTO: 19 % (ref 14–44)
MCH RBC QN AUTO: 28.8 PG (ref 26.8–34.3)
MCHC RBC AUTO-ENTMCNC: 32.5 G/DL (ref 31.4–37.4)
MCV RBC AUTO: 89 FL (ref 82–98)
MONOCYTES # BLD AUTO: 0.52 THOUSAND/ÂΜL (ref 0.17–1.22)
MONOCYTES NFR BLD AUTO: 10 % (ref 4–12)
NEUTROPHILS # BLD AUTO: 3.56 THOUSANDS/ÂΜL (ref 1.85–7.62)
NEUTS SEG NFR BLD AUTO: 70 % (ref 43–75)
NITRITE UR QL STRIP: NEGATIVE
NON-SQ EPI CELLS URNS QL MICRO: ABNORMAL /HPF
NRBC BLD AUTO-RTO: 0 /100 WBCS
PH UR STRIP.AUTO: 6.5 [PH]
PLATELET # BLD AUTO: 156 THOUSANDS/UL (ref 149–390)
PMV BLD AUTO: 9.5 FL (ref 8.9–12.7)
POTASSIUM SERPL-SCNC: 3.7 MMOL/L (ref 3.5–5.1)
PROT SERPL-MCNC: 7.2 G/DL (ref 6.4–8.3)
PROT UR STRIP-MCNC: NEGATIVE MG/DL
PROTHROMBIN TIME: 14.1 SECONDS (ref 12.3–15)
RBC # BLD AUTO: 4.82 MILLION/UL (ref 3.88–5.62)
RBC #/AREA URNS AUTO: ABNORMAL /HPF
SODIUM SERPL-SCNC: 141 MMOL/L (ref 136–145)
SP GR UR STRIP.AUTO: 1.02 (ref 1–1.03)
UROBILINOGEN UR QL STRIP.AUTO: 0.2 E.U./DL
WBC # BLD AUTO: 5.15 THOUSAND/UL (ref 4.31–10.16)
WBC #/AREA URNS AUTO: ABNORMAL /HPF

## 2025-07-25 PROCEDURE — 81001 URINALYSIS AUTO W/SCOPE: CPT | Performed by: PHYSICIAN ASSISTANT

## 2025-07-25 PROCEDURE — 85025 COMPLETE CBC W/AUTO DIFF WBC: CPT | Performed by: PHYSICIAN ASSISTANT

## 2025-07-25 PROCEDURE — 96361 HYDRATE IV INFUSION ADD-ON: CPT

## 2025-07-25 PROCEDURE — 70450 CT HEAD/BRAIN W/O DYE: CPT

## 2025-07-25 PROCEDURE — 80053 COMPREHEN METABOLIC PANEL: CPT | Performed by: PHYSICIAN ASSISTANT

## 2025-07-25 PROCEDURE — 85730 THROMBOPLASTIN TIME PARTIAL: CPT | Performed by: PHYSICIAN ASSISTANT

## 2025-07-25 PROCEDURE — 72125 CT NECK SPINE W/O DYE: CPT

## 2025-07-25 PROCEDURE — 99285 EMERGENCY DEPT VISIT HI MDM: CPT

## 2025-07-25 PROCEDURE — 73080 X-RAY EXAM OF ELBOW: CPT

## 2025-07-25 PROCEDURE — 96360 HYDRATION IV INFUSION INIT: CPT

## 2025-07-25 PROCEDURE — 85610 PROTHROMBIN TIME: CPT | Performed by: PHYSICIAN ASSISTANT

## 2025-07-25 RX ORDER — ACETAMINOPHEN 325 MG/1
650 TABLET ORAL ONCE
Status: COMPLETED | OUTPATIENT
Start: 2025-07-25 | End: 2025-07-25

## 2025-07-25 RX ORDER — GINSENG 100 MG
1 CAPSULE ORAL ONCE
Status: COMPLETED | OUTPATIENT
Start: 2025-07-25 | End: 2025-07-25

## 2025-07-25 RX ADMIN — Medication 1 APPLICATION: at 12:38

## 2025-07-25 RX ADMIN — ACETAMINOPHEN 650 MG: 325 TABLET ORAL at 13:38

## 2025-07-25 RX ADMIN — BACITRACIN 1 SMALL APPLICATION: 500 OINTMENT TOPICAL at 14:47

## 2025-07-25 RX ADMIN — SODIUM CHLORIDE 1000 ML: 900 INJECTION, SOLUTION INTRAVENOUS at 12:39

## 2025-07-25 NOTE — ED PROVIDER NOTES
Time reflects when diagnosis was documented in both MDM as applicable and the Disposition within this note       Time User Action Codes Description Comment    7/25/2025  2:18 PM Cherry Ndiaye [W19.XXXA] Fall, initial encounter     7/25/2025  2:18 PM Cherry Ndiaye [S09.90XA] Injury of head, initial encounter     7/25/2025  2:18 PM Cherry Ndiaye [S41.111A] Arm laceration, right, initial encounter     7/25/2025  2:18 PM Cherry Ndiaye [S00.81XA] Abrasion of face, initial encounter     7/25/2025  2:23 PM Cherry Ndiaye [R31.29] Microscopic hematuria           ED Disposition       ED Disposition   Discharge    Condition   Stable    Date/Time   Fri Jul 25, 2025  2:18 PM    Comment   Anibal Quarles discharge to home/self care.                   Assessment & Plan       Medical Decision Making  Reviewed results as in ED course  Serration repair as described.  Patient also had skin adhesive and cleaning of his abrasion of his right eyebrow.  Findings of hematuria microscopic on UA.  Recommended orthopedic follow-up in regards to concern for any underlying tendon issues and his laceration.  Also recommended to follow-up with his family doctor for wound recheck and further evaluation of microscopic hematuria.  Patient felt to be safe for discharge to home with close outpatient follow-up.  Instructed on supportive care follow-up and reasons to return.  Patient expresses understanding and agreement with plan.    Amount and/or Complexity of Data Reviewed  External Data Reviewed: labs and radiology.  Labs: ordered. Decision-making details documented in ED Course.  Radiology: ordered. Decision-making details documented in ED Course.    Risk  OTC drugs.        ED Course as of 07/25/25 1439   Fri Jul 25, 2025   1157 CT spine cervical without contrast  Unremarkable for any acute traumatic findings   1158 CT head without contrast  Unremarkable for any  acute traumatic findings     1228 XR elbow 3+ views RIGHT  Patient is nonverbal soft tissue injury proximal forearm.  No fracture elbow effusion   1319 CBC and differential   1319 APTT   1319 Protime-INR   1415 UA w Reflex to Microscopic w Reflex to Culture(!)  Analysis shows blood, patient should follow-up with his PCP in this regard.  Not suggestive of infection.   1416 Comprehensive metabolic panel  Unremarkable       Medications   bacitracin topical ointment 1 small application (has no administration in time range)   sodium chloride 0.9 % bolus 1,000 mL (1,000 mL Intravenous New Bag 7/25/25 1239)   LET gel 1 Application (1 Application Topical Given 7/25/25 1238)   acetaminophen (TYLENOL) tablet 650 mg (650 mg Oral Given 7/25/25 1338)       ED Risk Strat Scores                    No data recorded                            History of Present Illness       Chief Complaint   Patient presents with    Fall     Pt has condition that causes him to be unsteady. Has had multiple falls recently, most recent was this am. Has lac above right eyebrow, bridge of nose, and right elbow laceration. Pt is on aspirin. No loc. No complaints besides right arm pain around area of lac. GCS 15.        Past Medical History[1]   Past Surgical History[2]   Family History[3]   Social History[4]   E-Cigarette/Vaping    E-Cigarette Use Never User       E-Cigarette/Vaping Substances      I have reviewed and agree with the history as documented.     Patient is a 74-year-old male with demyelinating neuropathy with frequent falls presents with fall this morning.  He fell hitting his face and his right elbow.  No loss of consciousness.  Patient is on aspirin but no other blood thinners.  Patient has had more frequent falls recently.  Has a history of UTIs.  Has been feeling well recently eating and drinking well.  Denies any other injuries or complaints.      History provided by:  Patient and significant other  Fall      Review of Systems   HENT:           Facial/nose and right brow laceration   Skin:  Positive for wound (Right elbow wound and pain).   All other systems reviewed and are negative.          Objective       ED Triage Vitals [07/25/25 0945]   Temperature Pulse Blood Pressure Respirations SpO2 Patient Position - Orthostatic VS   98.3 °F (36.8 °C) 87 128/78 15 96 % Sitting      Temp Source Heart Rate Source BP Location FiO2 (%) Pain Score    Tympanic Monitor Left arm -- 3      Vitals      Date and Time Temp Pulse SpO2 Resp BP Pain Score FACES Pain Rating User   07/25/25 1338 -- -- -- -- -- 8 -- ANGELA   07/25/25 1045 -- 80 96 % 18 133/91 7 -- NW   07/25/25 0945 98.3 °F (36.8 °C) 87 96 % 15 128/78 3 -- KMS            Physical Exam  Vitals and nursing note reviewed.   Constitutional:       General: He is not in acute distress.     Appearance: He is well-developed.   HENT:      Head:      Comments: Proximal nasal laceration/abrasion.  Skin tear to right eyebrow.  Otherwise atraumatic    Eyes:      Conjunctiva/sclera: Conjunctivae normal.       Cardiovascular:      Rate and Rhythm: Normal rate and regular rhythm.      Heart sounds: No murmur heard.  Pulmonary:      Effort: Pulmonary effort is normal. No respiratory distress.      Breath sounds: Normal breath sounds.   Abdominal:      Palpations: Abdomen is soft.      Tenderness: There is no abdominal tenderness.     Musculoskeletal:         General: No swelling. Normal range of motion.      Cervical back: Neck supple.     Skin:     General: Skin is warm and dry.      Capillary Refill: Capillary refill takes less than 2 seconds.      Comments: Large laceration skin avulsion right elbow     Neurological:      General: No focal deficit present.      Mental Status: He is alert and oriented to person, place, and time. Mental status is at baseline.     Psychiatric:         Mood and Affect: Mood normal.         Results Reviewed       Procedure Component Value Units Date/Time    Urine Microscopic [[id number]]   (Abnormal) Collected: 07/25/25 1347    Lab Status: Final result Specimen: Urine, Clean Catch Updated: 07/25/25 1425     RBC, UA 10-20 /hpf      WBC, UA 0-5 /hpf      Epithelial Cells Occasional /hpf      Bacteria, UA Occasional /hpf      Hyaline Casts, UA 0-1 /lpf     UA w Reflex to Microscopic w Reflex to Culture [[id number]]  (Abnormal) Collected: 07/25/25 1347    Lab Status: Final result Specimen: Urine, Clean Catch Updated: 07/25/25 1415     Color, UA Yellow     Clarity, UA Clear     Specific Gravity, UA 1.020     pH, UA 6.5     Leukocytes, UA Negative     Nitrite, UA Negative     Protein, UA Negative mg/dl      Glucose, UA Negative mg/dl      Ketones, UA Negative mg/dl      Urobilinogen, UA 0.2 E.U./dl      Bilirubin, UA Negative     Occult Blood, UA Large    Comprehensive metabolic panel [[id number]] Collected: 07/25/25 1239    Lab Status: Final result Specimen: Blood Updated: 07/25/25 1347     Sodium 141 mmol/L      Potassium 3.7 mmol/L      Chloride 104 mmol/L      CO2 26 mmol/L      ANION GAP 11 mmol/L      BUN 21 mg/dL      Creatinine 0.89 mg/dL      Glucose 85 mg/dL      Calcium 9.3 mg/dL      AST 35 U/L      ALT 27 U/L      Alkaline Phosphatase 60 U/L      Total Protein 7.2 g/dL      Albumin 3.8 g/dL      Total Bilirubin 0.41 mg/dL      eGFR 85 ml/min/1.73sq m     Narrative:      National Kid[employer] Disease Foundation guidelines for Chronic Kidney Disease (CKD):     Stage 1 with normal or high GFR (GFR > 90 mL/min/1.73 square meters)    Stage 2 Mild CKD (GFR = 60-89 mL/min/1.73 square meters)    Stage 3A Moderate CKD (GFR = 45-59 mL/min/1.73 square meters)    Stage 3B Moderate CKD (GFR = 30-44 mL/min/1.73 square meters)    Stage 4 Severe CKD (GFR = 15-29 mL/min/1.73 square meters)    Stage 5 End Stage CKD (GFR <15 mL/min/1.73 square meters)  Note: GFR calculation is accurate only with a steady state creatinine    Protime-INR [[id number]]  (Normal) Collected: 07/25/25 1239    Lab Status: Final result Specimen:  Blood Updated: 07/25/25 1306     Protime 14.1 seconds      INR 1.09    Narrative:      INR Therapeutic Range    Indication                                             INR Range      Atrial Fibrillation                                               2.0-3.0  Hypercoagulable State                                    2.0.2.3  Left Ventricular Asist Device                            2.0-3.0  Mechanical Heart Valve                                  -    Aortic(with afib, MI, embolism, HF, LA enlargement,    and/or coagulopathy)                                     2.0-3.0 (2.5-3.5)     Mitral                                                             2.5-3.5  Prosthetic/Bioprosthetic Heart Valve               2.0-3.0  Venous thromboembolism (VTE: VT, PE        2.0-3.0    APTT [[id number]]  (Normal) Collected: 07/25/25 1239    Lab Status: Final result Specimen: Blood Updated: 07/25/25 1306     PTT 24 seconds     CBC and differential [[id number]] Collected: 07/25/25 1239    Lab Status: Final result Specimen: Blood Updated: 07/25/25 1302     WBC 5.15 Thousand/uL      RBC 4.82 Million/uL      Hemoglobin 13.9 g/dL      Hematocrit 42.8 %      MCV 89 fL      MCH 28.8 pg      MCHC 32.5 g/dL      RDW 15.0 %      MPV 9.5 fL      Platelets 156 Thousands/uL      nRBC 0 /100 WBCs      Segmented % 70 %      Immature Grans % 0 %      Lymphocytes % 19 %      Monocytes % 10 %      Eosinophils Relative 1 %      Basophils Relative 0 %      Absolute Neutrophils 3.56 Thousands/µL      Absolute Immature Grans 0.02 Thousand/uL      Absolute Lymphocytes 1.00 Thousands/µL      Absolute Monocytes 0.52 Thousand/µL      Eosinophils Absolute 0.03 Thousand/µL      Basophils Absolute 0.02 Thousands/µL             XR elbow 3+ views RIGHT   Final Interpretation by Sid Early MD (07/25 2839)      1. There is soft tissue injury at the proximal forearm.   2. No fracture is detected. There is no evidence of elbow effusion.         Computerized Assisted  "Algorithm (CAA) may have been used to analyze all applicable images.         Workstation performed: [id number]         CT spine cervical without contrast   Final Interpretation by Sid Early MD (07/25 0703)   1. No fracture is detected.   2. There is reversal of the normal lordotic curve. The appearance suggests that this is a chronic finding.   3. There is degenerative disc disease and there is bilateral facet joint arthritis.                  Workstation performed: [id number]         CT head without contrast   Final Interpretation by Sid Early MD (07/25 7039)      No acute intracranial abnormality. No fracture or intracranial hemorrhage is detected.                  Workstation performed: [id number]               Universal Protocol:  procedure performed by consultantConsent: Verbal consent obtained  Risks and benefits: risks, benefits and alternatives were discussed  Consent given by: patient  Time out: Immediately prior to procedure a \"time out\" was called to verify the correct patient, procedure, equipment, support staff and site/side marked as required.  Timeout called at: 7/25/2025 2:15 PM.  Patient understanding: patient states understanding of the procedure being performed  Patient consent: the patient's understanding of the procedure matches consent given  Procedure consent: procedure consent matches procedure scheduled  Patient identity confirmed: verbally with patient and arm band  Laceration repair    Date/Time: 7/25/2025 2:34 PM    Performed by: Cherry Ndiaye PA-C  Authorized by: Cherry Ndiaye PA-C  Body area: upper extremity  Location details: right elbow  Laceration length: 8 cm  Foreign bodies: no foreign bodies  Tendon involvement: superficial  Nerve involvement: superficial  Vascular damage: no  Anesthesia: local infiltration    Anesthesia:  Local Anesthetic: lidocaine 1% without epinephrine  Anesthetic total: 8 mL    Sedation:  Patient sedated: no      Wound " Dehiscence:  Superficial Wound Dehiscence: simple closure      Procedure Details:  Preparation: Patient was prepped and draped in the usual sterile fashion.  Irrigation solution: saline  Amount of cleaning: standard  Debridement: minimal  Skin closure: 3-0 nylon  Number of sutures: 24  Technique: simple  Approximation: close  Approximation difficulty: complex  Dressing: antibiotic ointment  Patient tolerance: patient tolerated the procedure well with no immediate complications          ED Medication and Procedure Management   Prior to Admission Medications   Prescriptions Last Dose Informant Patient Reported? Taking?   ARIPiprazole (ABILIFY) 10 mg tablet   No No   Sig: Take 1 tablet (10 mg total) by mouth daily   [employer] B-12 500 MCG tablet   No No   Sig: TAKE 1 TABLET BY MOUTH DAILY   Calcium Carb-Cholecalciferol (Calcium-Vitamin D3) 600-10 MG-MCG CAPS   Yes No   Sig: every 24 hours   FLUoxetine (PROzac) 40 MG capsule   No No   Sig: Take 2 capsules in th evening   Potassium Chloride ER 20 MEQ TBCR   Yes No   Sig: Take 1 tablet by mouth daily With food   alendronate (FOSAMAX) 70 mg tablet   Yes No   Sig: TAKE 1 TABLET BY MOUTH ONCE WEEKLY IN THE MORNING AT LEAST 30 MINUTES BEFORE FIRST FOOD, BEVERAGE OR MEDICATION OF THE DAY   alendronate (Fosamax) 70 mg tablet   Yes No   Sig: Take 1 tablet by mouth Once a week   aspirin (Aspirin 81) 81 mg chewable tablet   Yes No   Sig: Chew   aspirin (ECOTRIN LOW STRENGTH) 81 mg EC tablet   Yes No   Sig: Take 1 tablet by mouth Daily   atorvastatin (LIPITOR) 20 mg tablet   No No   Sig: TAKE 1 TABLET BY MOUTH EVERY DAY WITH DINNER   bacitracin topical ointment 500 units/g topical ointment   No No   Sig: Apply 1 large application topically 2 (two) times a day 1 large application topically twice a day to PEG tube area   buPROPion (WELLBUTRIN XL) 150 mg 24 hr tablet   No No   Sig: Take 1 tablet (150 mg total) by mouth every morning With a 300mg tab   buPROPion (Wellbutrin XL) 300 mg 24 hr  tablet   No No   Sig: Take 1 tablet (300 mg total) by mouth every morning With a 150 mg tab   carvedilol (COREG) 3.125 mg tablet   No No   Sig: Take 1 tablet (3.125 mg total) by mouth 2 (two) times a day with meals   carvedilol (COREG) 6.25 mg tablet   Yes No   Sig: Take 6.25 mg by mouth 2 (two) times a day   cholecalciferol (VITAMIN D) 400 units/1 mL   Yes No   Sig: Take by mouth   gabapentin (NEURONTIN) 100 mg capsule   No No   Sig: TAKE 2 CAPSULES BY MOUTH 3 TIMES A DAY   hydrocortisone (ANUSOL-HC) 25 mg suppository   No No   Sig: Insert 1 suppository (25 mg total) into the rectum 2 (two) times a day   hydrocortisone 1 % cream   No No   Sig: Apply topically 3 (three) times a day   immune globulin, human 20 GM/200ML   Yes No   levothyroxine 88 mcg tablet   No No   Sig: Take 1 tablet (88 mcg total) by mouth daily in the early morning   meloxicam (MOBIC) 15 mg tablet   Yes No   Sig: Take 1 tablet by mouth Daily   pantoprazole (PROTONIX) 40 mg tablet   No No   Sig: Take 1 tablet (40 mg total) by mouth daily in the early morning   saccharomyces boulardii (FLORASTOR) 250 mg capsule   No No   Sig: Take 1 capsule (250 mg total) by mouth 2 (two) times a day   sulfaSALAzine (AZULFIDINE) 500 mg tablet   Yes No      Facility-Administered Medications: None     Patient's Medications   Discharge Prescriptions    No medications on file       ED SEPSIS DOCUMENTATION   Time reflects when diagnosis was documented in both MDM as applicable and the Disposition within this note       Time User Action Codes Description Comment    7/25/2025  2:18 PM Cherry Ndiaye [W19.XXXA] Fall, initial encounter     7/25/2025  2:18 PM Cherry Ndiaye [S09.90XA] Injury of head, initial encounter     7/25/2025  2:18 PM Cherry Ndiaye [S41.111A] Arm laceration, right, initial encounter     7/25/2025  2:18 PM Cherry Ndiaye [S00.81XA] Abrasion of face, initial encounter     7/25/2025  2:23 PM  Cherry Ndiaye Add [R31.29] Microscopic hematuria                      [1]   Past Medical History:  Diagnosis Date    CIDP (chronic inflammatory demyelinating polyneuropathy) (HCC)     Crohn disease (HCC)     Degenerative disc disease, lumbar     Disease of thyroid gland     GERD (gastroesophageal reflux disease)     Myocardial infarction (HCC)     Psychiatric disorder    [2]   Past Surgical History:  Procedure Laterality Date    LAPAROSCOPIC APPENDECTOMY      PEG TUBE PLACEMENT      PROSTATECTOMY     [3] No family history on file.  [4]   Social History  Tobacco Use    Smoking status: Former    Smokeless tobacco: Never   Vaping Use    Vaping status: Never Used   Substance Use Topics    Alcohol use: Not Currently    Drug use: Not Currently        Cherry Ndiaye PA-C  07/25/25 6923

## 2025-07-25 NOTE — TELEPHONE ENCOUNTER
Medicare   Member ID: [id number]  Group: none  Effective date: 6/26    *patients card got hacked.

## 2025-07-25 NOTE — DISCHARGE INSTRUCTIONS
CT instructions on suture care, keep clean dry and intact.  Should be removed in 2 weeks.  Follow-up with your family doctor and orthopedics.  Monitor for signs of infection including surrounding redness drainage or fevers.  Return with any new worsening or concerning symptoms.

## 2025-07-25 NOTE — TELEPHONE ENCOUNTER
Writer left voicemail for client asking for his new Medicare number and to sign the virtual care behavioral health consent form.    Writer called secondary number (per note on account) and client's daughter picked up the phone.    Client's daughter, Madonna, informed writer that client had had a fall and was currently in [facility]'s Emergency room. Writer wished him a good recovery and Madonna asked if there was anything she can help with his appointment 8/7/25.    Writer informed her we would need a release of information filled out and witnessed by a [facility] Staff Member.     Madonna asked writer to email blank release of information as she can get it signed at [facility] as they are now in the [facility] Network.    [facility] emailed blank release and a typical release with wording set up for coordination of care (no personal information) with brief instructions to [email or web address].     Writer provided email address for [employer] to send the release back to IF they are integrated into the secure internal email network. Writer noted in email that as they are newly acquired, he is unsure if they have that capability yet as outside emails are not considered secure and  also provided office fax and mailing address.     [facility] did not put any information identifiable to client in email.

## 2025-08-05 ENCOUNTER — TELEPHONE (OUTPATIENT)
Dept: PSYCHIATRY | Facility: CLINIC | Age: 75
End: 2025-08-05

## 2025-08-07 ENCOUNTER — TELEPHONE (OUTPATIENT)
Dept: PSYCHIATRY | Facility: CLINIC | Age: 75
End: 2025-08-07

## 2025-08-07 ENCOUNTER — TELEMEDICINE (OUTPATIENT)
Dept: PSYCHIATRY | Facility: CLINIC | Age: 75
End: 2025-08-07
Payer: MEDICARE

## 2025-08-07 DIAGNOSIS — F33.1 MAJOR DEPRESSIVE DISORDER, RECURRENT, MODERATE (HCC): ICD-10-CM

## 2025-08-07 PROCEDURE — 99214 OFFICE O/P EST MOD 30 MIN: CPT | Performed by: PSYCHIATRY & NEUROLOGY

## 2025-08-07 RX ORDER — BUPROPION HYDROCHLORIDE 300 MG/1
300 TABLET ORAL EVERY MORNING
Qty: 90 TABLET | Refills: 0 | Status: SHIPPED | OUTPATIENT
Start: 2025-08-07

## 2025-08-07 RX ORDER — ARIPIPRAZOLE 10 MG/1
10 TABLET ORAL DAILY
Qty: 90 TABLET | Refills: 0 | Status: SHIPPED | OUTPATIENT
Start: 2025-08-07

## 2025-08-07 RX ORDER — FLUOXETINE HYDROCHLORIDE 40 MG/1
CAPSULE ORAL
Qty: 180 CAPSULE | Refills: 0 | Status: SHIPPED | OUTPATIENT
Start: 2025-08-07

## 2025-08-07 RX ORDER — BUPROPION HYDROCHLORIDE 150 MG/1
150 TABLET ORAL EVERY MORNING
Qty: 90 TABLET | Refills: 0 | Status: SHIPPED | OUTPATIENT
Start: 2025-08-07